# Patient Record
Sex: FEMALE | Race: WHITE | NOT HISPANIC OR LATINO | Employment: FULL TIME | URBAN - METROPOLITAN AREA
[De-identification: names, ages, dates, MRNs, and addresses within clinical notes are randomized per-mention and may not be internally consistent; named-entity substitution may affect disease eponyms.]

---

## 2020-07-14 ENCOUNTER — OFFICE VISIT (OUTPATIENT)
Dept: FAMILY MEDICINE | Facility: CLINIC | Age: 30
End: 2020-07-14
Payer: COMMERCIAL

## 2020-07-14 VITALS
BODY MASS INDEX: 24.48 KG/M2 | DIASTOLIC BLOOD PRESSURE: 80 MMHG | TEMPERATURE: 98.3 F | HEIGHT: 67 IN | SYSTOLIC BLOOD PRESSURE: 123 MMHG | OXYGEN SATURATION: 96 % | WEIGHT: 156 LBS | HEART RATE: 112 BPM | RESPIRATION RATE: 16 BRPM

## 2020-07-14 DIAGNOSIS — G43.719 INTRACTABLE CHRONIC MIGRAINE WITHOUT AURA AND WITHOUT STATUS MIGRAINOSUS: ICD-10-CM

## 2020-07-14 DIAGNOSIS — F41.9 ANXIETY: ICD-10-CM

## 2020-07-14 DIAGNOSIS — Z00.00 ROUTINE GENERAL MEDICAL EXAMINATION AT A HEALTH CARE FACILITY: Primary | ICD-10-CM

## 2020-07-14 PROCEDURE — 99385 PREV VISIT NEW AGE 18-39: CPT | Performed by: PHYSICIAN ASSISTANT

## 2020-07-14 RX ORDER — SERTRALINE HYDROCHLORIDE 100 MG/1
100 TABLET, FILM COATED ORAL DAILY
Qty: 90 TABLET | Refills: 3 | Status: SHIPPED | OUTPATIENT
Start: 2020-07-14 | End: 2021-12-15

## 2020-07-14 RX ORDER — AMITRIPTYLINE HYDROCHLORIDE 50 MG/1
50 TABLET ORAL AT BEDTIME
Qty: 90 TABLET | Refills: 3 | Status: SHIPPED | OUTPATIENT
Start: 2020-07-14 | End: 2021-12-15

## 2020-07-14 RX ORDER — AMITRIPTYLINE HYDROCHLORIDE 50 MG/1
TABLET ORAL
COMMUNITY
Start: 2020-06-09 | End: 2020-07-14

## 2020-07-14 RX ORDER — SERTRALINE HYDROCHLORIDE 100 MG/1
TABLET, FILM COATED ORAL
COMMUNITY
Start: 2020-06-09 | End: 2020-07-14

## 2020-07-14 ASSESSMENT — MIFFLIN-ST. JEOR: SCORE: 1623.3

## 2020-07-14 NOTE — PROGRESS NOTES
3  SUBJECTIVE:   CC: Leopoldo Rangel is an 29 year old male who presents for preventive health visit.     Healthy Habits:    Do you get at least three servings of calcium containing foods daily (dairy, green leafy vegetables, etc.)? yes    Amount of exercise or daily activities, outside of work: 2 day(s) per week    Problems taking medications regularly No    Medication side effects: No    Have you had an eye exam in the past two years? yes    Do you see a dentist twice per year? 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  Overall he has been feeling pretty good.  Work has not really changed since pandemic.  Does work in [employer].  Enjoys job.    Anxiety Follow-Up    How are you doing with your anxiety since your last visit? No change    Are you having other symptoms that might be associated with anxiety? No    Have you had a significant life event? No     Are you feeling depressed? No    Do you have any concerns with your use of alcohol or other drugs? No   Been on zoloft for a couple of years and has been very helpful in his symptoms.    Social History     Tobacco Use     Smoking status: Never Smoker     Smokeless tobacco: Never Used   Substance Use Topics     Alcohol use: Yes     Comment: occ.     Drug use: Never     No flowsheet data found.  No flowsheet data found.      Migraine     Since your last clinic visit, how have your headaches changed?  No change    How often are you getting headaches or migraines? 1-2 month     Are you able to do normal daily activities when you have a migraine? Yes    Are you taking rescue/relief medications? (Select all that apply) Excedrin    How helpful is your rescue/relief medication?  I get total relief    Are you taking any medications to prevent migraines? (Select all that apply)  Amitriptyline    In the past 4 weeks, how often have you gone to urgent care or the emergency room  "because of your headaches?  0        Today's PHQ-2 Score:   PHQ-2 ( 1999 Pfizer) 7/14/2020   Q1: Little interest or pleasure in doing things 0   Q2: Feeling down, depressed or hopeless 0   PHQ-2 Score 0       Abuse: Current or Past(Physical, Sexual or Emotional)- No  Do you feel safe in your environment? Yes        Social History     Tobacco Use     Smoking status: Never Smoker     Smokeless tobacco: Never Used   Substance Use Topics     Alcohol use: Yes     Comment: occ.     If you drink alcohol do you typically have >3 drinks per day or >7 drinks per week? No                      Last PSA: No results found for: PSA    Reviewed orders with patient. Reviewed health maintenance and updated orders accordingly - Yes  BP Readings from Last 3 Encounters:   07/14/20 123/80    Wt Readings from Last 3 Encounters:   07/14/20 70.8 kg (156 lb)                    Reviewed and updated as needed this visit by clinical staff  Tobacco  Allergies  Meds  Med Hx  Surg Hx  Fam Hx  Soc Hx        Reviewed and updated as needed this visit by Provider            ROS:  CONSTITUTIONAL: NEGATIVE for fever, chills, change in weight  INTEGUMENTARY/SKIN: NEGATIVE for worrisome rashes, moles or lesions  EYES: NEGATIVE for vision changes or irritation  ENT: NEGATIVE for ear, mouth and throat problems  RESP: NEGATIVE for significant cough or SOB  CV: NEGATIVE for chest pain, palpitations or peripheral edema  GI: NEGATIVE for nausea, abdominal pain, heartburn, or change in bowel habits   male: negative for dysuria, hematuria, decreased urinary stream, erectile dysfunction, urethral discharge  MUSCULOSKELETAL: NEGATIVE for significant arthralgias or myalgia  NEURO: NEGATIVE for weakness, dizziness or paresthesias  PSYCHIATRIC: NEGATIVE for changes in mood or affect    OBJECTIVE:   /80   Pulse 112   Temp 98.3  F (36.8  C) (Oral)   Resp 16   Ht 1.689 m (5' 6.5\")   Wt 70.8 kg (156 lb)   SpO2 96%   BMI 24.80 kg/m    EXAM:  GENERAL: " "alert and no distress  EYES: Eyes grossly normal to inspection, PERRL and conjunctivae and sclerae normal  HENT: ear canals and TM's normal, nose and mouth without ulcers or lesions  NECK: no adenopathy, no asymmetry, masses, or scars and thyroid normal to palpation  RESP: lungs clear to auscultation - no rales, rhonchi or wheezes  CV: regular rate and rhythm, normal S1 S2, no S3 or S4, no murmur, click or rub, no peripheral edema and peripheral pulses strong  ABDOMEN: soft, nontender, no hepatosplenomegaly, no masses and bowel sounds normal  MS: no gross musculoskeletal defects noted, no edema  SKIN: no suspicious lesions or rashes  NEURO: Normal strength and tone, mentation intact and speech normal  PSYCH: mentation appears normal, affect normal/bright    Diagnostic Test Results:  Labs reviewed in Epic    ASSESSMENT/PLAN:   1. Routine general medical examination at a health care facility  Doing well.  Declines fasting labs.  Thinks he is up to date on immunizations, going to check records at home.    2. Intractable chronic migraine without aura and without status migrainosus  Stable.  - amitriptyline (ELAVIL) 50 MG tablet; Take 1 tablet (50 mg) by mouth At Bedtime  Dispense: 90 tablet; Refill: 3    3. Anxiety  Stable.  - sertraline (ZOLOFT) 100 MG tablet; Take 1 tablet (100 mg) by mouth daily  Dispense: 90 tablet; Refill: 3    COUNSELING:  Reviewed preventive health counseling, as reflected in patient instructions       Regular exercise       Healthy diet/nutrition    Estimated body mass index is 24.8 kg/m  as calculated from the following:    Height as of this encounter: 1.689 m (5' 6.5\").    Weight as of this encounter: 70.8 kg (156 lb).         reports that he has never smoked. He has never used smokeless tobacco.      Counseling Resources:  ATP IV Guidelines  Pooled Cohorts Equation Calculator  FRAX Risk Assessment  ICSI Preventive Guidelines  Dietary Guidelines for Americans, 2010  USDA's MyPlate  ASA " Prophylaxis  Lung CA Screening    Rey Hester PA-C  [facility]

## 2020-12-08 ENCOUNTER — MYC MEDICAL ADVICE (OUTPATIENT)
Dept: FAMILY MEDICINE | Facility: CLINIC | Age: 30
End: 2020-12-08

## 2021-08-15 ENCOUNTER — HEALTH MAINTENANCE LETTER (OUTPATIENT)
Age: 31
End: 2021-08-15

## 2021-10-10 ENCOUNTER — HEALTH MAINTENANCE LETTER (OUTPATIENT)
Age: 31
End: 2021-10-10

## 2021-12-15 ENCOUNTER — E-VISIT (OUTPATIENT)
Dept: FAMILY MEDICINE | Facility: CLINIC | Age: 31
End: 2021-12-15
Payer: COMMERCIAL

## 2021-12-15 DIAGNOSIS — F41.9 ANXIETY: ICD-10-CM

## 2021-12-15 PROCEDURE — 99421 OL DIG E/M SVC 5-10 MIN: CPT | Performed by: PHYSICIAN ASSISTANT

## 2021-12-15 RX ORDER — SERTRALINE HYDROCHLORIDE 100 MG/1
150 TABLET, FILM COATED ORAL DAILY
Qty: 135 TABLET | Refills: 1 | Status: SHIPPED | OUTPATIENT
Start: 2021-12-15 | End: 2022-03-11

## 2022-03-11 ENCOUNTER — OFFICE VISIT (OUTPATIENT)
Dept: FAMILY MEDICINE | Facility: CLINIC | Age: 32
End: 2022-03-11
Payer: COMMERCIAL

## 2022-03-11 VITALS
WEIGHT: 165 LBS | RESPIRATION RATE: 14 BRPM | BODY MASS INDEX: 25.01 KG/M2 | TEMPERATURE: 97.4 F | HEART RATE: 66 BPM | SYSTOLIC BLOOD PRESSURE: 109 MMHG | OXYGEN SATURATION: 96 % | HEIGHT: 68 IN | DIASTOLIC BLOOD PRESSURE: 58 MMHG

## 2022-03-11 DIAGNOSIS — Z00.00 ROUTINE GENERAL MEDICAL EXAMINATION AT A HEALTH CARE FACILITY: Primary | ICD-10-CM

## 2022-03-11 DIAGNOSIS — F41.9 ANXIETY: ICD-10-CM

## 2022-03-11 DIAGNOSIS — Z11.4 SCREENING FOR HIV (HUMAN IMMUNODEFICIENCY VIRUS): ICD-10-CM

## 2022-03-11 DIAGNOSIS — L98.9 SKIN LESION: ICD-10-CM

## 2022-03-11 DIAGNOSIS — F90.0 ATTENTION DEFICIT HYPERACTIVITY DISORDER (ADHD), PREDOMINANTLY INATTENTIVE TYPE: ICD-10-CM

## 2022-03-11 DIAGNOSIS — Z76.89 ENCOUNTER TO ESTABLISH CARE WITH NEW DOCTOR: ICD-10-CM

## 2022-03-11 DIAGNOSIS — Z11.59 NEED FOR HEPATITIS C SCREENING TEST: ICD-10-CM

## 2022-03-11 LAB
AMPHETAMINES UR QL: NOT DETECTED
BARBITURATES UR QL SCN: NOT DETECTED
BENZODIAZ UR QL SCN: NOT DETECTED
BUPRENORPHINE UR QL: NOT DETECTED
CANNABINOIDS UR QL: NOT DETECTED
CHOLEST SERPL-MCNC: 201 MG/DL
COCAINE UR QL SCN: NOT DETECTED
D-METHAMPHET UR QL: NOT DETECTED
FASTING STATUS PATIENT QL REPORTED: NO
HCV AB SERPL QL IA: NONREACTIVE
HDLC SERPL-MCNC: 50 MG/DL
HIV 1+2 AB+HIV1 P24 AG SERPL QL IA: NONREACTIVE
LDLC SERPL CALC-MCNC: 127 MG/DL
METHADONE UR QL SCN: NOT DETECTED
NONHDLC SERPL-MCNC: 151 MG/DL
OPIATES UR QL SCN: NOT DETECTED
OXYCODONE UR QL SCN: NOT DETECTED
PCP UR QL SCN: NOT DETECTED
PROPOXYPH UR QL: NOT DETECTED
TRICYCLICS UR QL SCN: NOT DETECTED
TRIGL SERPL-MCNC: 122 MG/DL

## 2022-03-11 PROCEDURE — 90471 IMMUNIZATION ADMIN: CPT | Performed by: FAMILY MEDICINE

## 2022-03-11 PROCEDURE — 80061 LIPID PANEL: CPT | Performed by: FAMILY MEDICINE

## 2022-03-11 PROCEDURE — 80306 DRUG TEST PRSMV INSTRMNT: CPT | Performed by: FAMILY MEDICINE

## 2022-03-11 PROCEDURE — 36415 COLL VENOUS BLD VENIPUNCTURE: CPT | Performed by: FAMILY MEDICINE

## 2022-03-11 PROCEDURE — 86803 HEPATITIS C AB TEST: CPT | Performed by: FAMILY MEDICINE

## 2022-03-11 PROCEDURE — 90715 TDAP VACCINE 7 YRS/> IM: CPT | Performed by: FAMILY MEDICINE

## 2022-03-11 PROCEDURE — 99395 PREV VISIT EST AGE 18-39: CPT | Mod: 25 | Performed by: FAMILY MEDICINE

## 2022-03-11 PROCEDURE — 90651 9VHPV VACCINE 2/3 DOSE IM: CPT | Performed by: FAMILY MEDICINE

## 2022-03-11 PROCEDURE — 90472 IMMUNIZATION ADMIN EACH ADD: CPT | Performed by: FAMILY MEDICINE

## 2022-03-11 PROCEDURE — 87389 HIV-1 AG W/HIV-1&-2 AB AG IA: CPT | Performed by: FAMILY MEDICINE

## 2022-03-11 RX ORDER — SERTRALINE HYDROCHLORIDE 100 MG/1
150 TABLET, FILM COATED ORAL DAILY
Qty: 135 TABLET | Refills: 3 | Status: SHIPPED | OUTPATIENT
Start: 2022-03-11 | End: 2023-03-29

## 2022-03-11 ASSESSMENT — ENCOUNTER SYMPTOMS
DYSURIA: 0
JOINT SWELLING: 0
NERVOUS/ANXIOUS: 0
FEVER: 0
COUGH: 0
HEMATOCHEZIA: 0
PALPITATIONS: 0
WEAKNESS: 0
DIZZINESS: 0
MYALGIAS: 0
HEMATURIA: 0
CHILLS: 0
HEARTBURN: 0
PARESTHESIAS: 0
HEADACHES: 1
NAUSEA: 0
DIARRHEA: 0
FREQUENCY: 0
ABDOMINAL PAIN: 0
SORE THROAT: 0
ARTHRALGIAS: 0
CONSTIPATION: 0
SHORTNESS OF BREATH: 0
EYE PAIN: 0

## 2022-03-11 NOTE — PATIENT INSTRUCTIONS
For your ADHD:    Send me result of diagnostic assessment ADHD in college    Then we'll start 20 mg Vyvanse    Follow up 1 month with me in clinic or virtual visit      Preventive Health Recommendations  Male Ages 26 - 39    Yearly exam:             See your health care provider every year in order to  o   Review health changes.   o   Discuss preventive care.    o   Review your medicines if your doctor has prescribed any.    You should be tested each year for STDs (sexually transmitted diseases), if you re at risk.     After age 35, talk to your provider about cholesterol testing. If you are at risk for heart disease, have your cholesterol tested at least every 5 years.     If you are at risk for diabetes, you should have a diabetes test (fasting glucose).  Shots: Get a flu shot each year. Get a tetanus shot every 10 years.     Nutrition:    Eat at least 5 servings of fruits and vegetables daily.     Eat whole-grain bread, whole-wheat pasta and brown rice instead of white grains and rice.     Get adequate Calcium and Vitamin D.     Lifestyle    Exercise for at least 150 minutes a week (30 minutes a day, 5 days a week). This will help you control your weight and prevent disease.     Limit alcohol to one drink per day.     No smoking.     Wear sunscreen to prevent skin cancer.     See your dentist every six months for an exam and cleaning.

## 2022-03-11 NOTE — LETTER
[facility]  03/11/22  Patient: Leopoldo Rangel  YOB: 1990  Medical Record Number: [id number]                                                                                  Non-Opioid Controlled Substance Agreement    This is an agreement between you and your provider regarding safe and appropriate use of controlled substances prescribed by your care team. Controlled substances are?medicines that can cause physical and mental dependence (abuse).     There are strict laws about having and using these medicines. We here at [facility] are  committed to working with you in your efforts to get better. To support you in this work, we'll help you schedule regular office appointments for medicine refills. If we must cancel or change your appointment for any reason, we'll make sure you have enough medicine to last until your next appointment.     As a Provider, I will:     Listen carefully to your concerns while treating you with respect.     Recommend a treatment plan that I believe is in your best interest and may involve therapies other than medicine.      Talk with you often about the possible benefits and the risk of harm of any medicine that we prescribe for you.    Assess the safety of this medicine and check how well it works.      Provide a plan on how to taper (discontinue or go off) using this medicine if the decision is made to stop its use.      ::  As a Patient, I understand controlled substances:       Are prescribed by my care provider to help me function or work and manage my condition(s).?    Are strong medicines and can cause serious side effects.       Need to be taken exactly as prescribed.?Combining controlled substances with certain medicines or chemicals (such as illegal drugs, alcohol, sedatives, sleeping pills, and benzodiazepines) can be dangerous or even fatal.? If I stop taking my medicines suddenly, I may have severe withdrawal symptoms.     The  risks, benefits, and side effects of these medicine(s) were explained to me. I agree that:    1. I will take part in other treatments as advised by my care team. This may be psychiatry or counseling, physical therapy, behavioral therapy, group treatment or a referral to specialist.    2. I will keep all my appointments and understand this is part of the monitoring of controlled substances.?My care team may require an office visit for EVERY controlled substance refill. If I miss appointments or don t follow instructions, my care team may stop my medicine    3. I will take my medicines as prescribed. I will not change the dose or schedule unless my care team tells me to. There will be no refills if I run out early.      4. I may be asked to come to the clinic and complete a urine drug test or complete a pill count. If I don t give a urine sample or participate in a pill count, the care team may stop my medicine.    5. I will only receive controlled substance prescriptions from this clinic. If I am treated by another provider, I will tell them that I am taking controlled substances and that I have a treatment agreement with this provider. I will inform my [facility] care team within one business day if I am given a prescription for any controlled substance by another healthcare provider. My [facility] care team can contact other providers and pharmacists about my use of any medicines.    6. It is up to me to make sure that I don't run out of my medicines on weekends or holidays.?If my care team is willing to refill my prescription without a visit, I must request refills only during office hours. Refills may take up to 3 business days to process. I will use one pharmacy to fill all my controlled substance prescriptions. I will notify the clinic about any changes to my insurance or medicine availability.    7. I am responsible for my prescriptions. If the medicine/prescription is lost, stolen or destroyed,  it will not be replaced.?I also agree not to share controlled substance medicines with anyone.     8. I am aware I should not use any illegal or recreational drugs. I agree not to drink alcohol unless my care team says I can.     9. If I enroll in the [place] Medical Cannabis program, I will tell my care team before my next refill.    10. I will tell my care team right away if I become pregnant, have a new medical problem treated outside of my regular clinic, or have a change in my medicines.     11. I understand that this medicine can affect my thinking, judgment and reaction time.? Alcohol and drugs affect the brain and body, which can affect the safety of my driving. Being under the influence of alcohol or drugs can affect my decision-making, behaviors, personal safety and the safety of others. Driving while impaired (DWI) can occur if a person is driving, operating or in physical control of a car, motorcycle, boat, snowmobile, ATV, motorbike, off-road vehicle or any other motor vehicle ([place] Statute 169A.20). I understand the risk if I choose to drive or operate any vehicle or machinery.    I understand that if I do not follow any of the conditions above, my prescriptions or treatment may be stopped or changed.   I agree that my provider, clinic care team and pharmacy may work with any city, state or federal law enforcement agency that investigates the misuse, sale or other diversion of my controlled medicine. I will allow my provider to discuss my care with, or share a copy of, this agreement with any other treating provider, pharmacy or emergency room where I receive care.     I have read this agreement and have asked questions about anything I did not understand.    ________________________________________________________  Patient Signature - Leopoldo Rangel     ___________________                   Date     ________________________________________________________  Provider Signature - Chelsey Patel MD        ___________________                   Date     ________________________________________________________  Witness Signature (required if provider not present while patient signing)          ___________________                   Date

## 2022-03-11 NOTE — PROGRESS NOTES
SUBJECTIVE:   CC: Leopoldo Rangel is an 31 year old male who presents for preventative health visit.       Patient has been advised of split billing requirements and indicates understanding: Yes  Healthy Habits:     Getting at least 3 servings of Calcium per day:  Yes    Bi-annual eye exam:  Yes    Dental care twice a year:  Yes    Sleep apnea or symptoms of sleep apnea:  Daytime drowsiness    Diet:  Regular (no restrictions)    Frequency of exercise:  None    Taking medications regularly:  Yes    Medication side effects:  None    PHQ-2 Total Score: 0    Additional concerns today:  Yes    1. Moles to look at.  Looked at years ago.  10 yrs.  Haven't changed a lot.  Not bleeding or itching.    2.  Anxiety.  Diagnosed mid college, but manageable.  Also has ADHD and diagnosed in college.  Then a few years later - would have pangs of anxiety out of nowhere connected to nothing.  Then talked to pcp, started sertraline.  Bumped up to 150.  Well controlled now.    Also:  In therapy right now.  Helped.  Getting enough sleep helps.  But doesn't do it enough.  Has been taking melatonin semi-periodically which helps.  Exercise: doesn't.  Likes rock climbing.  Went to [employer].    Also:  ADHD.  Diagnosed in college.  Was on Vyvanse 10 mg - didn't really work.  Tried ritalin - not helpful.  Dad has ADD and on Vyvanse worked well.  Also tried other medications, can't remember which.      No flowsheet data found.      Today's PHQ-2 Score:   PHQ-2 ( 1999 Pfizer) 3/11/2022   Q1: Little interest or pleasure in doing things 0   Q2: Feeling down, depressed or hopeless 0   PHQ-2 Score 0   PHQ-2 Total Score (12-17 Years)- Positive if 3 or more points; Administer PHQ-A if positive -   Q1: Little interest or pleasure in doing things Not at all   Q2: Feeling down, depressed or hopeless Not at all   PHQ-2 Score 0       Abuse: Current or Past(Physical, Sexual or Emotional)- NA  Do you feel safe in your environment? Yes    Have you ever done  Advance Care Planning? (For example, a Health Directive, POLST, or a discussion with a medical provider or your loved ones about your wishes): No, advance care planning information given to patient to review.  Advanced care planning was discussed at today's visit.    Social History     Tobacco Use     Smoking status: Never Smoker     Smokeless tobacco: Never Used   Substance Use Topics     Alcohol use: Yes     Comment: occ.     If you drink alcohol do you typically have >3 drinks per day or >7 drinks per week? No    Alcohol Use 3/11/2022   Prescreen: >3 drinks/day or >7 drinks/week? No   Prescreen: >3 drinks/day or >7 drinks/week? -       Last PSA: No results found for: PSA    Reviewed orders with patient. Reviewed health maintenance and updated orders accordingly - Yes  Lab work is in process    Reviewed and updated as needed this visit by clinical staff   Tobacco  Allergies  Meds  Problems  Med Hx  Surg Hx  Fam Hx  Soc   Hx        Reviewed and updated as needed this visit by Provider   Tobacco  Allergies  Meds  Problems  Med Hx  Surg Hx  Fam Hx             Review of Systems   Constitutional: Negative for chills and fever.   HENT: Positive for congestion. Negative for ear pain, hearing loss and sore throat.    Eyes: Negative for pain and visual disturbance.   Respiratory: Negative for cough and shortness of breath.    Cardiovascular: Negative for chest pain, palpitations and peripheral edema.   Gastrointestinal: Negative for abdominal pain, constipation, diarrhea, heartburn, hematochezia and nausea.   Genitourinary: Negative for dysuria, frequency, genital sores, hematuria, impotence, penile discharge and urgency.   Musculoskeletal: Negative for arthralgias, joint swelling and myalgias.   Skin: Negative for rash.   Neurological: Positive for headaches. Negative for dizziness, weakness and paresthesias.   Psychiatric/Behavioral: Negative for mood changes. The patient is not nervous/anxious.   "        OBJECTIVE:   /58 (BP Location: Left arm, Patient Position: Chair, Cuff Size: Adult Regular)   Pulse 66   Temp 97.4  F (36.3  C) (Temporal)   Resp 14   Ht 1.715 m (5' 7.5\")   Wt 74.8 kg (165 lb)   SpO2 96%   BMI 25.46 kg/m      Physical Exam  GENERAL: healthy, alert and no distress  EYES: Eyes grossly normal to inspection, PERRL and conjunctivae and sclerae normal  HENT: ear canals and TM's normal, nose and mouth without ulcers or lesions  NECK: no adenopathy, no asymmetry, masses, or scars and thyroid normal to palpation  RESP: lungs clear to auscultation - no rales, rhonchi or wheezes  CV: regular rate and rhythm, normal S1 S2, no S3 or S4, no murmur, click or rub, no peripheral edema and peripheral pulses strong  ABDOMEN: soft, nontender, no hepatosplenomegaly, no masses and bowel sounds normal  MS: no gross musculoskeletal defects noted, no edema  SKIN: no suspicious lesions or rashes  NEURO: Normal strength and tone, mentation intact and speech normal  PSYCH: mentation appears normal, affect normal/bright    Diagnostic Test Results:  Labs reviewed in Epic    ASSESSMENT/PLAN:   Leopoldo was seen today for establish care and physical.    Diagnoses and all orders for this visit:    Routine general medical examination at a health care facility  Comments:  New to me today.  Lipids done. Routine hep c and hiv per cdc recs  TDAP today  Gardasil 2nd today, will do 3rd next visit    Orders:  -     REVIEW OF HEALTH MAINTENANCE PROTOCOL ORDERS  -     TDAP VACCINE (Adacel, Boostrix)  [[id number]]  -     Lipid panel reflex to direct LDL Fasting; Future  -     HPV, IM (9 - 26 YRS) - Gardasil 9  -     Adult Dermatology Referral; Future  -     Lipid panel reflex to direct LDL Fasting    Encounter to establish care with new doctor  Comments:  Reviewed and updated probl list and pmx with patient and via Care Everywhere.    Screening for HIV (human immunodeficiency virus)  Comments:  Routine, low risk.  Once as " "adult per cdc recs  Orders:  -     HIV Antigen Antibody Combo; Future  -     HIV Antigen Antibody Combo    Need for hepatitis C screening test  Comments:  Routine, low risk.  Once as adult per cdc recs  Orders:  -     Hepatitis C Screen Reflex to HCV RNA Quant and Genotype; Future  -     Hepatitis C Screen Reflex to HCV RNA Quant and Genotype    Anxiety  Comments:  Since college.  Sertraline works well.  Refilled today.  Also: discussed importance of exercise, sleep, meditation/mindfulness as part of anxiety management  Orders:  -     sertraline (ZOLOFT) 100 MG tablet; Take 1.5 tablets (150 mg) by mouth daily    Attention deficit hyperactivity disorder (ADHD), predominantly inattentive type  Comments:  Dx'd in college.  Will send paperwork  Vyvanse worked well in past and for his dad  Plan: utox and csa done today  Once sends in dx paperwork will start vyv 20 mg  Follow up E-visit 1 month or in person, virtual  Orders:  -     Drug Abuse Screen Panel 13, Urine (Pain Care Package) - lab collect; Future  -     Drug Abuse Screen Panel 13, Urine (Pain Care Package) - lab collect    Skin lesion  Comments:  referred dermatology        Patient has been advised of split billing requirements and indicates understanding: Yes    COUNSELING:   Reviewed preventive health counseling, as reflected in patient instructions    Estimated body mass index is 25.46 kg/m  as calculated from the following:    Height as of this encounter: 1.715 m (5' 7.5\").    Weight as of this encounter: 74.8 kg (165 lb).         He reports that he has never smoked. He has never used smokeless tobacco.      Counseling Resources:  ATP IV Guidelines  Pooled Cohorts Equation Calculator  FRAX Risk Assessment  ICSI Preventive Guidelines  Dietary Guidelines for Americans, 2010  USDA's MyPlate  ASA Prophylaxis  Lung CA Screening    Chelsey Patel MD  [facility]    "

## 2022-03-11 NOTE — PROGRESS NOTES
Prior to immunization administration, verified patients identity using patient s name and date of birth. Please see Immunization Activity for additional information.     Screening Questionnaire for Adult Immunization    Are you sick today?   No   Do you have allergies to medications, food, a vaccine component or latex?   No   Have you ever had a serious reaction after receiving a vaccination?   No   Do you have a long-term health problem with heart, lung, kidney, or metabolic disease (e.g., diabetes), asthma, a blood disorder, no spleen, complement component deficiency, a cochlear implant, or a spinal fluid leak?  Are you on long-term aspirin therapy?   No   Do you have cancer, leukemia, HIV/AIDS, or any other immune system problem?   No   Do you have a parent, brother, or sister with an immune system problem?   No   In the past 3 months, have you taken medications that affect  your immune system, such as prednisone, other steroids, or anticancer drugs; drugs for the treatment of rheumatoid arthritis, Crohn s disease, or psoriasis; or have you had radiation treatments?   No   Have you had a seizure, or a brain or other nervous system problem?   No   During the past year, have you received a transfusion of blood or blood    products, or been given immune (gamma) globulin or antiviral drug?   No   For women: Are you pregnant or is there a chance you could become       pregnant during the next month?   No   Have you received any vaccinations in the past 4 weeks?   No     Immunization questionnaire answers were all negative.        Per orders of Dr. Chelsey Patel, injection of Tdap/HPV given by Lisa Silvestre MA. Patient instructed to remain in clinic for 15 minutes afterwards, and to report any adverse reaction to me immediately.       Screening performed by Lisa Silvestre MA on 3/11/2022 at 11:10 AM.    Answers for HPI/ROS submitted by the patient on 3/11/2022  Frequency of exercise:: None  Getting at least 3 servings of  Calcium per day:: Yes  Diet:: Regular (no restrictions)  Taking medications regularly:: Yes  Medication side effects:: None  Bi-annual eye exam:: Yes  Dental care twice a year:: Yes  Sleep apnea or symptoms of sleep apnea:: Daytime drowsiness  abdominal pain: No  Blood in stool: No  Blood in urine: No  chest pain: No  chills: No  congestion: Yes  constipation: No  cough: No  diarrhea: No  dizziness: No  ear pain: No  eye pain: No  nervous/anxious: No  fever: No  frequency: No  genital sores: No  headaches: Yes  hearing loss: No  heartburn: No  arthralgias: No  joint swelling: No  peripheral edema: No  mood changes: No  myalgias: No  nausea: No  dysuria: No  palpitations: No  Skin sensation changes: No  sore throat: No  urgency: No  rash: No  shortness of breath: No  visual disturbance: No  weakness: No  impotence: No  penile discharge: No  Additional concerns today:: Yes

## 2022-03-12 NOTE — RESULT ENCOUNTER NOTE
Artie Mina,    It was nice to see you in clinic recently! Your results are back and show:    Your cholesterol is borderline high, but your overall heart disease risk score is low.  Continue exercise and heart healthy Mediterranean-style diet rich in fish, olive oil, whole grains, vegetables and low in animal fats and processed foods to reduce your risk of heart disease.    The rest of your lab tests are normal.    Let me know if you have any questions.  Best,  Dr. Chelsey Patel MD/[facility]

## 2022-03-14 PROBLEM — R48.0 DYSLEXIA: Status: ACTIVE | Noted: 2022-03-14

## 2022-03-14 RX ORDER — LISDEXAMFETAMINE DIMESYLATE 20 MG/1
20 CAPSULE ORAL EVERY MORNING
Qty: 30 CAPSULE | Refills: 0 | Status: SHIPPED | OUTPATIENT
Start: 2022-03-14 | End: 2022-04-26

## 2022-04-26 ENCOUNTER — VIRTUAL VISIT (OUTPATIENT)
Dept: FAMILY MEDICINE | Facility: CLINIC | Age: 32
End: 2022-04-26
Payer: COMMERCIAL

## 2022-04-26 DIAGNOSIS — F90.0 ATTENTION DEFICIT HYPERACTIVITY DISORDER (ADHD), PREDOMINANTLY INATTENTIVE TYPE: Primary | ICD-10-CM

## 2022-04-26 PROCEDURE — 99213 OFFICE O/P EST LOW 20 MIN: CPT | Mod: 95 | Performed by: FAMILY MEDICINE

## 2022-04-26 RX ORDER — LISDEXAMFETAMINE DIMESYLATE 30 MG/1
30 CAPSULE ORAL EVERY MORNING
Qty: 30 CAPSULE | Refills: 0 | Status: SHIPPED | OUTPATIENT
Start: 2022-04-26 | End: 2022-05-24

## 2022-04-26 ASSESSMENT — ANXIETY QUESTIONNAIRES
7. FEELING AFRAID AS IF SOMETHING AWFUL MIGHT HAPPEN: SEVERAL DAYS
1. FEELING NERVOUS, ANXIOUS, OR ON EDGE: SEVERAL DAYS
6. BECOMING EASILY ANNOYED OR IRRITABLE: SEVERAL DAYS
4. TROUBLE RELAXING: SEVERAL DAYS
7. FEELING AFRAID AS IF SOMETHING AWFUL MIGHT HAPPEN: SEVERAL DAYS
5. BEING SO RESTLESS THAT IT IS HARD TO SIT STILL: MORE THAN HALF THE DAYS
2. NOT BEING ABLE TO STOP OR CONTROL WORRYING: NOT AT ALL
GAD7 TOTAL SCORE: 7
3. WORRYING TOO MUCH ABOUT DIFFERENT THINGS: SEVERAL DAYS
GAD7 TOTAL SCORE: 7
GAD7 TOTAL SCORE: 7

## 2022-04-26 ASSESSMENT — PATIENT HEALTH QUESTIONNAIRE - PHQ9
10. IF YOU CHECKED OFF ANY PROBLEMS, HOW DIFFICULT HAVE THESE PROBLEMS MADE IT FOR YOU TO DO YOUR WORK, TAKE CARE OF THINGS AT HOME, OR GET ALONG WITH OTHER PEOPLE: SOMEWHAT DIFFICULT
SUM OF ALL RESPONSES TO PHQ QUESTIONS 1-9: 5
SUM OF ALL RESPONSES TO PHQ QUESTIONS 1-9: 5

## 2022-04-26 NOTE — PROGRESS NOTES
Leopoldo is a 31 year old who is being evaluated via a billable video visit.      How would you like to obtain your AVS? [employer]  If the video visit is dropped, the invitation should be resent by: Text to cell phone: [phone]  Will anyone else be joining your video visit? No      Video Start Time: 8:52 AM    Assessment & Plan     Leopoldo was seen today for recheck medication.    Diagnoses and all orders for this visit:    Attention deficit hyperactivity disorder (ADHD), predominantly inattentive type  Comments:  No side effects on 20 mg  Not lasting through day  Try increase to 30  F/U w/ evisit 1 mo  Also: continue to work to increase exercise - rock climb, walk dog!  Orders:  -     lisdexamfetamine (VYVANSE) 30 MG capsule; Take 1 capsule (30 mg) by mouth every morning          Prescription drug management      Rev:      Return in about 4 weeks (around 5/24/2022) for ADHD, E-VISIT: in [employer] Main Menu Select E-visit.    Chelsey Patel MD  [facility]   Leopoldo is a 31 year old who presents for the following health issues     History of Present Illness       Mental Health Follow-up:                    Today's PHQ-9         PHQ-9 Total Score: 5  PHQ-9 Q9 Thoughts of better off dead/self-harm past 2 weeks :   (P) Not at all    How difficult have these problems made it for you to do your work, take care of things at home, or get along with other people: Somewhat difficult    Today's FERNANDO-7 Score: 7    Reason for visit:  ADHD Medication Check-in  Symptom onset:  More than a month    He eats 2-3 servings of fruits and vegetables daily.He consumes 0 sweetened beverage(s) daily.He exercises with enough effort to increase his heart rate 9 or less minutes per day.  He exercises with enough effort to increase his heart rate 3 or less days per week.   He is taking medications regularly.     Here today for Vyvanse follow up.    Seems to be helping.  But wears off relatively  "quickly.  10-12 is sweet spot, then tapers off.    Sleep okay.  Going to bed at 11 pm - falls asleep.  Appetite same.  Hasn't lost any weight that knows of    Hasn't really exercised since last time BUT has been texting a friend to get rock climbing on the books.          Review of Systems   Constitutional, HEENT, cardiovascular, pulmonary, gi and gu systems are negative, except as otherwise noted.      Objective    Vitals - Patient Reported  Weight (Patient Reported): 68 kg (150 lb)  Height (Patient Reported): 170.2 cm (5' 7\")  BMI (Based on Pt Reported Ht/Wt): 23.49      Vitals:  No vitals were obtained today due to virtual visit.    Physical Exam   GENERAL: Healthy, alert and no distress  EYES: Eyes grossly normal to inspection.  No discharge or erythema, or obvious scleral/conjunctival abnormalities.  RESP: No audible wheeze, cough, or visible cyanosis.  No visible retractions or increased work of breathing.    SKIN: Visible skin clear. No significant rash, abnormal pigmentation or lesions.  NEURO: Cranial nerves grossly intact.  Mentation and speech appropriate for age.  PSYCH: Mentation appears normal, affect normal/bright, judgement and insight intact, normal speech and appearance well-groomed.    No results found for any visits on 04/26/22.  Office Visit on 03/11/2022   Component Date Value Ref Range Status     HIV Antigen Antibody Combo 03/11/2022 Nonreactive  Nonreactive Final    HIV-1 p24 Ag & HIV-1/HIV-2 Ab Not Detected     Hepatitis C Antibody 03/11/2022 Nonreactive  Nonreactive Final     Cholesterol 03/11/2022 201 (A) <200 mg/dL Final     Triglycerides 03/11/2022 122  <150 mg/dL Final     Direct Measure HDL 03/11/2022 50  >=40 mg/dL Final     LDL Cholesterol Calculated 03/11/2022 127 (A) <=100 mg/dL Final     Non HDL Cholesterol 03/11/2022 151 (A) <130 mg/dL Final     Patient Fasting > 8hrs? 03/11/2022 No   Final     Cannabinoids (81-ecj-8-carboxy-9-T* 03/11/2022 Not Detected  Not Detected, " Indeterminate Final    Cutoff for a negative cannabinoid is 50 ng/mL or less.     Phencyclidine 03/11/2022 Not Detected  Not Detected, Indeterminate Final    Cutoff for a negative PCP is 25 ng/mL or less.     Cocaine (Benzoylecgonine) 03/11/2022 Not Detected  Not Detected, Indeterminate Final    Cutoff for a negative cocaine is 150 ng/ml or less.     Methamphetamine (d-Methamphetamine) 03/11/2022 Not Detected  Not Detected, Indeterminate Final    Cutoff for a negative methamphetamine is 500 ng/ml or less.     Opiates (Morphine) 03/11/2022 Not Detected  Not Detected, Indeterminate Final    Cutoff for a negative opiate is 100 ng/ml or less.     Amphetamine (d-Amphetamine) 03/11/2022 Not Detected  Not Detected, Indeterminate Final    Cutoff for a negative amphetamine is 500 ng/mL or less.     Benzodiazepines (Nordiazepam) 03/11/2022 Not Detected  Not Detected, Indeterminate Final    Cutoff for a negative benzodiazepine is 150 ng/ml or less.     Tricyclic Antidepressants (Desipra* 03/11/2022 Not Detected  Not Detected, Indeterminate Final    Cutoff for a negative tricyclic antidepressant is 300 ng/ml or less.     Methadone 03/11/2022 Not Detected  Not Detected, Indeterminate Final    Cutoff for a negative methadone is 200 ng/ml or less.     Barbiturates (Butalbital) 03/11/2022 Not Detected  Not Detected, Indeterminate Final    Cutoff for a negative barbituate is 200 ng/ml or less.     Oxycodone 03/11/2022 Not Detected  Not Detected, Indeterminate Final    Cutoff for a negative oxycodone is 100 ng/mL or less.     Propoxyphene (Norpropoxyphene) 03/11/2022 Not Detected  Not Detected, Indeterminate Final    Cutoff for a negative propoxyphene is 300 ng/ml or less.     Buprenorphine 03/11/2022 Not Detected  Not Detected, Indeterminate Final    Cutoff for a negative buprenorphine is 10 ng/ml or less.             Video-Visit Details    Type of service:  Video Visit    Video End Time:9:05 AM    Originating Location (pt.  Location): Home    Distant Location (provider location):  [facility]     Platform used for Video Visit: [employer]

## 2022-04-27 ASSESSMENT — PATIENT HEALTH QUESTIONNAIRE - PHQ9: SUM OF ALL RESPONSES TO PHQ QUESTIONS 1-9: 5

## 2022-04-27 ASSESSMENT — ANXIETY QUESTIONNAIRES: GAD7 TOTAL SCORE: 7

## 2022-05-03 ENCOUNTER — LAB (OUTPATIENT)
Dept: LAB | Facility: CLINIC | Age: 32
End: 2022-05-03
Attending: FAMILY MEDICINE
Payer: COMMERCIAL

## 2022-05-03 DIAGNOSIS — Z20.822 ENCOUNTER FOR LABORATORY TESTING FOR COVID-19 VIRUS: ICD-10-CM

## 2022-05-03 PROCEDURE — U0005 INFEC AGEN DETEC AMPLI PROBE: HCPCS

## 2022-05-03 PROCEDURE — U0003 INFECTIOUS AGENT DETECTION BY NUCLEIC ACID (DNA OR RNA); SEVERE ACUTE RESPIRATORY SYNDROME CORONAVIRUS 2 (SARS-COV-2) (CORONAVIRUS DISEASE [COVID-19]), AMPLIFIED PROBE TECHNIQUE, MAKING USE OF HIGH THROUGHPUT TECHNOLOGIES AS DESCRIBED BY CMS-2020-01-R: HCPCS

## 2022-05-04 LAB — SARS-COV-2 RNA RESP QL NAA+PROBE: NEGATIVE

## 2022-05-24 ENCOUNTER — E-VISIT (OUTPATIENT)
Dept: FAMILY MEDICINE | Facility: CLINIC | Age: 32
End: 2022-05-24
Payer: COMMERCIAL

## 2022-05-24 DIAGNOSIS — F90.0 ATTENTION DEFICIT HYPERACTIVITY DISORDER (ADHD), PREDOMINANTLY INATTENTIVE TYPE: Primary | ICD-10-CM

## 2022-05-24 PROCEDURE — 99421 OL DIG E/M SVC 5-10 MIN: CPT | Performed by: FAMILY MEDICINE

## 2022-05-24 RX ORDER — LISDEXAMFETAMINE DIMESYLATE 30 MG/1
30 CAPSULE ORAL DAILY
Qty: 30 CAPSULE | Refills: 0 | Status: SHIPPED | OUTPATIENT
Start: 2022-05-24 | End: 2022-06-23

## 2022-05-24 RX ORDER — LISDEXAMFETAMINE DIMESYLATE 30 MG/1
30 CAPSULE ORAL DAILY
Qty: 30 CAPSULE | Refills: 0 | Status: SHIPPED | OUTPATIENT
Start: 2022-07-25 | End: 2022-08-24

## 2022-05-24 RX ORDER — LISDEXAMFETAMINE DIMESYLATE 30 MG/1
30 CAPSULE ORAL DAILY
Qty: 30 CAPSULE | Refills: 0 | Status: SHIPPED | OUTPATIENT
Start: 2022-06-24 | End: 2022-07-24

## 2022-05-24 ASSESSMENT — ANXIETY QUESTIONNAIRES
GAD7 TOTAL SCORE: 11
5. BEING SO RESTLESS THAT IT IS HARD TO SIT STILL: MORE THAN HALF THE DAYS
GAD7 TOTAL SCORE: 11
8. IF YOU CHECKED OFF ANY PROBLEMS, HOW DIFFICULT HAVE THESE MADE IT FOR YOU TO DO YOUR WORK, TAKE CARE OF THINGS AT HOME, OR GET ALONG WITH OTHER PEOPLE?: SOMEWHAT DIFFICULT
4. TROUBLE RELAXING: MORE THAN HALF THE DAYS
7. FEELING AFRAID AS IF SOMETHING AWFUL MIGHT HAPPEN: SEVERAL DAYS
2. NOT BEING ABLE TO STOP OR CONTROL WORRYING: MORE THAN HALF THE DAYS
6. BECOMING EASILY ANNOYED OR IRRITABLE: SEVERAL DAYS
1. FEELING NERVOUS, ANXIOUS, OR ON EDGE: MORE THAN HALF THE DAYS
7. FEELING AFRAID AS IF SOMETHING AWFUL MIGHT HAPPEN: SEVERAL DAYS
GAD7 TOTAL SCORE: 11
3. WORRYING TOO MUCH ABOUT DIFFERENT THINGS: SEVERAL DAYS

## 2022-05-24 NOTE — PATIENT INSTRUCTIONS
Thank you for choosing us for your care. I have placed an order for a prescription so that you can start treatment. View your full visit summary for details by clicking on the link below. Your pharmacist will able to address any questions you may have about the medication.      If you're not feeling better within 4-6 weeks please schedule an appointment.  You can schedule an appointment right here in [facility], or call [phone]  If the visit is for the same symptoms as your eVisit, we'll refund the cost of your eVisit if seen within seven days.

## 2022-05-25 ASSESSMENT — ANXIETY QUESTIONNAIRES: GAD7 TOTAL SCORE: 11

## 2022-06-21 ENCOUNTER — MYC MEDICAL ADVICE (OUTPATIENT)
Dept: FAMILY MEDICINE | Facility: CLINIC | Age: 32
End: 2022-06-21

## 2022-06-23 NOTE — TELEPHONE ENCOUNTER
See RN response to patient's mychart message re:sertraline refill,    ESSENCE MadisonN RN  [facility]

## 2022-07-01 ENCOUNTER — MYC MEDICAL ADVICE (OUTPATIENT)
Dept: FAMILY MEDICINE | Facility: CLINIC | Age: 32
End: 2022-07-01

## 2022-09-07 ENCOUNTER — E-VISIT (OUTPATIENT)
Dept: FAMILY MEDICINE | Facility: CLINIC | Age: 32
End: 2022-09-07
Payer: COMMERCIAL

## 2022-09-07 DIAGNOSIS — F90.0 ATTENTION DEFICIT HYPERACTIVITY DISORDER (ADHD), PREDOMINANTLY INATTENTIVE TYPE: Primary | ICD-10-CM

## 2022-09-07 PROCEDURE — 99422 OL DIG E/M SVC 11-20 MIN: CPT | Performed by: FAMILY MEDICINE

## 2022-09-07 RX ORDER — LISDEXAMFETAMINE DIMESYLATE 30 MG/1
30 CAPSULE ORAL DAILY
Qty: 30 CAPSULE | Refills: 0 | Status: SHIPPED | OUTPATIENT
Start: 2022-11-08 | End: 2022-12-08

## 2022-09-07 RX ORDER — LISDEXAMFETAMINE DIMESYLATE 30 MG/1
30 CAPSULE ORAL DAILY
Qty: 30 CAPSULE | Refills: 0 | Status: SHIPPED | OUTPATIENT
Start: 2022-09-07 | End: 2022-10-07

## 2022-09-07 RX ORDER — LISDEXAMFETAMINE DIMESYLATE 30 MG/1
30 CAPSULE ORAL DAILY
Qty: 30 CAPSULE | Refills: 0 | Status: SHIPPED | OUTPATIENT
Start: 2022-10-08 | End: 2022-11-07

## 2022-09-07 ASSESSMENT — ANXIETY QUESTIONNAIRES
GAD7 TOTAL SCORE: 3
2. NOT BEING ABLE TO STOP OR CONTROL WORRYING: NOT AT ALL
GAD7 TOTAL SCORE: 3
5. BEING SO RESTLESS THAT IT IS HARD TO SIT STILL: SEVERAL DAYS
4. TROUBLE RELAXING: SEVERAL DAYS
1. FEELING NERVOUS, ANXIOUS, OR ON EDGE: NOT AT ALL
GAD7 TOTAL SCORE: 3
7. FEELING AFRAID AS IF SOMETHING AWFUL MIGHT HAPPEN: NOT AT ALL
6. BECOMING EASILY ANNOYED OR IRRITABLE: SEVERAL DAYS
8. IF YOU CHECKED OFF ANY PROBLEMS, HOW DIFFICULT HAVE THESE MADE IT FOR YOU TO DO YOUR WORK, TAKE CARE OF THINGS AT HOME, OR GET ALONG WITH OTHER PEOPLE?: SOMEWHAT DIFFICULT
3. WORRYING TOO MUCH ABOUT DIFFERENT THINGS: NOT AT ALL
7. FEELING AFRAID AS IF SOMETHING AWFUL MIGHT HAPPEN: NOT AT ALL

## 2022-09-08 ASSESSMENT — ANXIETY QUESTIONNAIRES: GAD7 TOTAL SCORE: 3

## 2022-09-24 ENCOUNTER — HEALTH MAINTENANCE LETTER (OUTPATIENT)
Age: 32
End: 2022-09-24

## 2022-10-25 ENCOUNTER — MYC MEDICAL ADVICE (OUTPATIENT)
Dept: FAMILY MEDICINE | Facility: CLINIC | Age: 32
End: 2022-10-25

## 2022-10-25 DIAGNOSIS — F64.0 GENDER DYSPHORIA IN ADULT: Primary | ICD-10-CM

## 2022-10-26 NOTE — TELEPHONE ENCOUNTER
Dr. Patel:  Pended gender care referral if appropriate, considering the experience of our two new providers.    The second thing I'd like to ask you about is newer. I've been working with my therapist for the past few years, and he recently gave me an official diagnosis of Gender Dysphoria. I'd like to start hormone replacement therapy in January or February of 2023. Ideally with an endocrinologist who specializes in trans care, if possible.      From what I could find online, [facility] has what they call their Comprehensive Gender Care Program. However, I was very unclear as to how to go about accessing those services, nor what services are available.     Would you be able to help get me in contact with the right people, or at least point me in the correct direction with this?    Will re-relay derm referral info.    [facility]   [address]   [place] [address]   Phone: [phone]     HEATHER Ardon RN  [facility]

## 2022-10-27 ENCOUNTER — TELEPHONE (OUTPATIENT)
Dept: PLASTIC SURGERY | Facility: CLINIC | Age: 32
End: 2022-10-27

## 2022-10-27 NOTE — TELEPHONE ENCOUNTER
Writer responded via [employer].    ESSENCE NickersonN, RN-BC  [facility]

## 2022-10-27 NOTE — TELEPHONE ENCOUNTER
[facility] Call Center    Phone Message    May a detailed message be left on voicemail: yes     Reason for Call: Other: pt looking to schedule from order - please call pt back regarding scheduling      Action Taken: Message routed to:  [facility] surgery     Travel Screening: Not Applicable

## 2022-10-27 NOTE — TELEPHONE ENCOUNTER
Signed referral but I believe there is quite a wait, so may want to see Breana or Ajit at our clinic.  Can you let them know?        Dr. Chelsey Patel MD / [facility]

## 2022-10-28 NOTE — TELEPHONE ENCOUNTER
Pt asked for referrals for voice therapy, HRT, and laser hair removal. Writer sent referrals via [employer] ([employer]). Writer to also send pt's info to [facility] for waitlist there.

## 2022-12-13 ENCOUNTER — OFFICE VISIT (OUTPATIENT)
Dept: FAMILY MEDICINE | Facility: CLINIC | Age: 32
End: 2022-12-13
Attending: FAMILY MEDICINE
Payer: COMMERCIAL

## 2022-12-13 VITALS
RESPIRATION RATE: 12 BRPM | BODY MASS INDEX: 24.36 KG/M2 | SYSTOLIC BLOOD PRESSURE: 129 MMHG | DIASTOLIC BLOOD PRESSURE: 78 MMHG | TEMPERATURE: 98.9 F | WEIGHT: 155.2 LBS | HEART RATE: 112 BPM | HEIGHT: 67 IN | OXYGEN SATURATION: 96 %

## 2022-12-13 DIAGNOSIS — F64.0 GENDER DYSPHORIA IN ADULT: ICD-10-CM

## 2022-12-13 PROCEDURE — 99214 OFFICE O/P EST MOD 30 MIN: CPT | Performed by: FAMILY MEDICINE

## 2022-12-13 ASSESSMENT — PATIENT HEALTH QUESTIONNAIRE - PHQ9: SUM OF ALL RESPONSES TO PHQ QUESTIONS 1-9: 10

## 2022-12-13 NOTE — PATIENT INSTRUCTIONS
Effects of Feminizing Hormone Therapy: When They Happen      * If you are on feminizing hormones, your body is still able to make sperm, so your partner could still get pregnant. Feminizing hormones are not birth control. Some people choose to stop feminizing hormones with the goal of having a baby. You may then choose to go back to feminizing hormones therapy at any time. The effect of feminizing therapy on the amount and quality of sperm varies and is unknown.   **Electrolysis, laser treatments, or both can remove facial, armpit, and pubic hair. In most cases, these changes are permanent.  **it may be possible to prevent and treat scalp hair loss    Informed Consent Form for Feminizing Medications    This form refers to the use of estrogen, progesterone, and/or androgen antagonists (sometimes called  testosterone suppressant ,  anti-androgens  or  androgen-blockers) by persons who wish to feminize their body. While there are risks associated with taking feminizing medications, when appropriately prescribed they can improve mental health and quality of life. Please seek another opinion if you want additional perspective on any aspect of your care.    Feminizing Effects      I understand that estrogen, progesterone, androgen antagonists, or a combination may be prescribed to feminize my body:  Skin may become softer  Muscle mass may decrease  Body hair may decrease  Fat may redistribute from abdomen to buttocks and thighs    2.     I understand that feminizing effects of estrogen and androgen antagonists can take several months to a year to become noticable, speed and degree of change is unpredictable, and is different for every person     3.     I understand that if I am taking estrogen I will probably develop breasts, and:   Breasts may take several years to develop to their full size.  Even if estrogen is stopped, the breast tissue that has developed will remain.  As soon as breasts start growing, it is  recommended to have an annual brest exam.  There may be milky nipple discharge (galactorrhea). If you develop this, it is advised to check with a doctor to determine the cause.  It is not known if taking estrogen increases the risk of breast cancer.     4.    I understand that the following changes may occur if I stop taking feminizing medications:     Skin may become rougher   Muscle mass increases and there may be an increase in upper body strength  Body hair growth may become more noticable and grow more quickly   Male pattern baldness may develop more quickly, and hair that has already been lost will likely not grow back.  Fat may redistribute back to a more masculine pattern (increased in abdomen, decreased on buttocks/hips/thighs)    5.    I understand that taking feminizing medications will make my testicles produce less testosterone, which can affect my overall sexual function:  Fertility may be impaired, I may become sterile, and going off feminizing medications may not bring back my ability to have biological children. I should consider sperm banking if I desire biological children.  Sperm may not mature, leading to reduced fertility. However, it is still possible to get someone pregnant and contraception should be used if needed.  Testicles may shrink by 25-50%. Testicular examinations are still recommended in young people to check for masses.  The amount of fluid ejactulated may be reduced.  There is typically a decrease in morning and spontaneous erections.  Erections may not be firm enough for penetrative sex.   Libido (sex drive) may decrease.    6.     I understand that there are some aspects of my body that are not significantly changed by feminizing medications  Beard/facial hair may grow more slowly and be less noticeable, but will not go away.  Voice pitch will not rise and speech patterns will not become more feminine (for example, feminine patterns of enunciation, body language, and phonation).    The laryngeal prominence ( Ojn s apple ) will not shrink.    7.     I understand that there are other non-medical options that can be helpful for presenting more feminine, and I may ask for additional resources about these.  Breast forms  Packers and tucking  Speech therapy for adopting feminine vocal patterns  Hair removal     Risks of Feminizing Medications    I understand that the medical effects and safety of feminizing medications are not fully understood, and that there may be long-term risks that are not yet known.     I understand that I am strongly advised not to take more medications than I am prescribed, as this increases health risks. It won t help changes come faster or increase the degree of changes.    I understand that feminizing medications can damage the liver. I have been advised that I should be monitored for possible liver damage as long as I am taking feminizing medications.     I understand that feminizing medications will result in changes that will be noticeable by other people, and that some people in similar circumstances have experienced harassment, discrimination, and violence, while others have lost support of loved ones. I have been advised that referrals can be made for support/counseling if this would be helpful.     I understand that taking estrogen increases the risk of blood clots, which can result in:   pulmonary embolism (blood clot to the lungs), which may cause death  stroke, which may cause permanent brain damage or death  heart attack  chronic leg vein problems    6.   I understand that the risk of blood clots is much worse if I smoke cigarettes, especially over age 40. I understand that the danger is so high that I should stop smoking completely if I start taking estrogen. I can ask my doctor for advice on quitting.    7.   I understand that taking estrogen can increase deposits of fat around my internal organs, which is associated with increased risk for diabetes and  heart disease.    8.   I understand that taking estrogen can cause increased blood pressure, estrogen increases the risk of gallstones, estrogen can cause headaches or migraines and can cause nausea and vomiting. I have been advised that if I develop these, it is recommended that I discuss this with my doctor.    9.   I understand that it is not known if taking estrogen increases the risk of non-cancerous tumors of the pituitary gland in the brain. I have been informed that although this is typically not life-threatening, it can damage vision. I understand that this will be monitored.    10. I have been informed that I am more likely to have dangerous side effects from estrogen if I smoke, I carry extra weight, am over 40 years old, or  have a history of blood clots, high blood pressure, or a family history of breast cancer.    11. I have been informed that if I take too much estrogen, my body may convert it into testosterone, which may slow or stop feminization.    Medical Risks Associated with Androgen Blockers    1.   I have been informed that spironolactone (a testosterone suppressant) affects the balance of water and salts in the kidneys, and that this may:  Increase the amount of urine produced, and I may urinate more frequently for about 2 weeks  Reduce blood pressure  Rarely, cause high levels of potassium in the blood, and this will be monitored    2.   I understand that some androgen antagonists make it more difficult to evaluate the results of PSA (prostate) test. If I am over 50, I should have my prostate evaluated every year.    Prevention of Medical Complications    1.   I agree to take feminizing medications as prescribed and to tell my care provider if I am not happy with the treatment or am experiencing any problems.    2.   I understand that the right dose or type of medication prescribed for me may not be the same as for someone else.    3.   I understand that physical examinations and blood tests  are needed on a regular basis (monthly, at first) to check for negative side effects of feminizing medications.    4.   I understand that feminization medications can interact with other medication (including other sources of hormones), dietary supplements, herbs, alcohol, and street drugs. I understand that being honest with my care provider about what else I am taking will help prevent medical complications that could be life-threatening. I have been informed that I will continue to get medical care no matter what information I share, and health care providers cannot legally disclose information about a patient s drug use to law enforcement.    5.   I understand that some medical conditions make it dangerous to take estrogen or androgen antagonists. I agree to be checked for risky conditions before the decision to start or continue feminizing medication is made.    6.   I understand that I can choose to stop taking feminizing medication at any time, and that it is advised that I do this with the help of my doctor to make sure there are no negative reactions to stopping. I understand that my doctor may suggest I reduce, switch or stop taking feminizing medication, if there are severe health risks that can t be controlled.

## 2022-12-13 NOTE — PROGRESS NOTES
"  Assessment & Plan       Assessment and Plan     Leopoldo  is a 32 year old individual that uses They/Them/Their/Theirs pronouns presents today for interest in feminizing treatment to better align their body with their gender identity. This patient came to this clinic via referral from: Dr. Chelsey Patel at [facility]    Gender Dysphoria diagnosis  By 2023, pt will have had at least 6 months of experiencin. A marked incongruence between one s experienced/expressed gender and 1  or 2  sex characteristics (or, in young pts, anticipated 2  sex characteristics)  3. A strong desire for the 1  and/or 2  sex characteristics of the other gender  4. A strong desire to be of the other gender (or some alternative gender different from one s assigned gender)  5. A strong desire to be treated as the other gender (or some alternative gender)    Majority of visit spent discussing effects of GAHT on fertility and cryopreservation plans.      Educated about 3 parts of evaluation before starting HRT    Physical health - completed this visit, will need labs next visit    Mental health - deferred to next visit    Informed consent - deferred to next visit    Medical safety for hormones    Lacks contraindications to hormonal therapy    Counseling completed today    Fertility plan if starts cross-sex hormones: cryotherapy, trying to figure out \"how much\" to bank. Would like to have 2 kids starting in 2-3 years.     Mental health assessment -    Will be completed by me at next visit     Informed consent process    Yes --- Is able to provide informed consent     Yes --- Likely to take hormones in a responsible manner    Yes --- Discussed the clinical and biochemical monitoring of hormonal changes and the potential impact on reproductive health & fertility      (This assessment is based on the 2011 published Standards of Care for the Health of Transsexual, Transgender, and Gender-Nonconforming People, Version 7, by the " "World Professional Association of Transgender Health. WPATH SOC Guidelines)    ADHD  Current PCP in [place], Vyvanse is running out, has appt with PCP in March 2023, needs bridge until then. Been on vyvanse for 1 year.   Did not have time to fully address this today  - I advised patient to contact PCP, their clinic should have process for bridging or protocols for frequency of appointments.           I spent a total of 31 minutes on the day of the visit.   Time spent doing chart review, history and exam, documentation and further activities per the note       April Vanessa MD        Depression Screening Follow Up    PHQ 12/13/2022   PHQ-9 Total Score 10   Q9: Thoughts of better off dead/self-harm past 2 weeks Not at all       No follow-ups on file.    April Vanessa MD  [facility]    Andrea Mina is a 32 year old, presenting for the following health issues:  Medication Request (Would like to start HRT. Has never had HRT.)      HPI       Leopoldo, they/them  Accompanied by wife, Rosi (she/her)    [facility]  New Patient History and Physical, Gender-Nonconforming Patient      Name: Leopoldo  Pronouns: They/Them/Their/Theirs    Patient has primary care outside of [facility]? Yes: for now  Seeking primary care, hormonal care, surgical care, mental health, pharmacy, care coordination, social work? Hormone for now      Gender Identity       How would you describe your internal gender identity? transfeminine  In an ideal world, what physical characteristics would you want? Typical cis-female. Breasts, butt, hips, thighs, waist, decrase body hair        Gender History       When did you first recognize that your body and identity didn t match?  o July 2022  o July 2022: euphoria from a video game not using \"he\"    What parts of your body or presentation make you feel uncomfortable or cause dysphoria?  o Body hair    Have you ever seen a healthcare " provider for gender-affirming care?   o No  o Previous HRT: NO  o Previous surgeries (where, surgeon name, year, and surgical intervention): None  o Ever had problems with hormone treatment? N/A        Goals of Treatment       Interested in medically, legally, or socially transitioning?   o Yes: all of the above    Desire to have any gender affirming surgery now or in the future?  o Yes: maybe breast augmentation and maybe facial feminization      Support Network       Have you shared this part of your identity with anyone?   o Yes: Rosi (wife), handful of close friends    Do you have a support network or people in your life who help you feel affirmed?   o Yes:     Are you out at work, school or home?   o No  o Wants to know for sure (start hormones and see results) before telling everyone      Social History     Living environment    Who lives in your household?   o Rosi (wife), dog    What do you do?    o Works full time at a [employer]    Has anyone hurt you physically, for example by pushing, hitting, slapping or kicking you or forcing you to have sex?   o Denies    Do you feel threatened or controlled by a partner, ex-partner or anyone in your life?   o Denies    Relationships    How do you describe your sexual or romantic orientation?   o Bisexual    Romantic or sexual partners include:   o ciswomen so far. Current: Rosi.     Current partners number:   o 1    Current partners   o have sperm? No   o able to get pregnant? Yes     Fertility plans?     Likely want to have kids in the next few years. Plans to use [employer] for sperm cryopreservation.      Interest in cryopreservation? Yes     Contraception? nuva ring      Social History     Socioeconomic History     Marital status:      Spouse name: None     Number of children: None     Years of education: None     Highest education level: None   Tobacco Use     Smoking status: Never     Smokeless tobacco: Never   Substance and Sexual Activity      "Alcohol use: Yes     Comment: occ.     Drug use: Never     Sexual activity: Yes     Partners: Female     Birth control/protection: I.U.D.               Surgical History   No past surgical history on file.      Past Medical History     Patient Active Problem List   Diagnosis     Intractable chronic migraine without aura and without status migrainosus     Mild anxiety     Attention deficit hyperactivity disorder (ADHD), predominantly inattentive type     Skin lesion     Dyslexia     Gender dysphoria in adult     No past medical history on file.  Current Outpatient Medications   Medication Sig Dispense Refill     sertraline (ZOLOFT) 100 MG tablet Take 1.5 tablets (150 mg) by mouth daily 135 tablet 3     History   Smoking Status     Never   Smokeless Tobacco     Never     No Known Allergies      Family History     Family History   Problem Relation Age of Onset     Diabetes Mother      Mental Illness Father      Colon Cancer Maternal Grandmother      Arthritis Paternal Grandmother      Mental Illness Sister          Physical Exam         Objective    /78   Pulse 112   Temp 98.9  F (37.2  C) (Oral)   Resp 12   Ht 1.707 m (5' 7.22\")   Wt 70.4 kg (155 lb 3.2 oz)   SpO2 96%   BMI 24.15 kg/m    Body mass index is 24.15 kg/m .  Physical Exam  Vitals reviewed.   Constitutional:       Appearance: Leopoldo Rangel is well-developed and well-nourished.   HENT:      Head: Normocephalic and atraumatic.   Eyes:      Conjunctiva/sclera: Conjunctivae normal.   Cardiovascular:      Rate and Rhythm: Normal rate.   Pulmonary:      Effort: Pulmonary effort is normal. No respiratory distress.   Skin:     General: Skin is warm and dry.      Coloration: Skin is not pale.      Findings: No erythema or rash.                  "

## 2022-12-16 ENCOUNTER — E-VISIT (OUTPATIENT)
Dept: FAMILY MEDICINE | Facility: CLINIC | Age: 32
End: 2022-12-16
Payer: COMMERCIAL

## 2022-12-16 DIAGNOSIS — F90.0 ATTENTION DEFICIT HYPERACTIVITY DISORDER (ADHD), PREDOMINANTLY INATTENTIVE TYPE: Primary | ICD-10-CM

## 2022-12-16 PROCEDURE — 99421 OL DIG E/M SVC 5-10 MIN: CPT | Performed by: FAMILY MEDICINE

## 2022-12-16 RX ORDER — LISDEXAMFETAMINE DIMESYLATE 30 MG/1
30 CAPSULE ORAL DAILY
Qty: 30 CAPSULE | Refills: 0 | Status: SHIPPED | OUTPATIENT
Start: 2023-01-16 | End: 2023-02-15

## 2022-12-16 RX ORDER — LISDEXAMFETAMINE DIMESYLATE 30 MG/1
30 CAPSULE ORAL DAILY
Qty: 30 CAPSULE | Refills: 0 | Status: SHIPPED | OUTPATIENT
Start: 2023-02-16 | End: 2023-03-18

## 2022-12-16 RX ORDER — LISDEXAMFETAMINE DIMESYLATE 30 MG/1
30 CAPSULE ORAL DAILY
Qty: 30 CAPSULE | Refills: 0 | Status: SHIPPED | OUTPATIENT
Start: 2022-12-16 | End: 2023-01-15

## 2023-01-10 ENCOUNTER — OFFICE VISIT (OUTPATIENT)
Dept: FAMILY MEDICINE | Facility: CLINIC | Age: 33
End: 2023-01-10
Payer: COMMERCIAL

## 2023-01-10 VITALS
DIASTOLIC BLOOD PRESSURE: 90 MMHG | RESPIRATION RATE: 18 BRPM | TEMPERATURE: 97.8 F | SYSTOLIC BLOOD PRESSURE: 138 MMHG | OXYGEN SATURATION: 98 % | HEART RATE: 120 BPM

## 2023-01-10 DIAGNOSIS — F64.0 GENDER DYSPHORIA IN ADULT: Primary | ICD-10-CM

## 2023-01-10 LAB
ALBUMIN SERPL BCG-MCNC: 4.8 G/DL (ref 3.5–5.2)
ALP SERPL-CCNC: 65 U/L (ref 35–129)
ALT SERPL W P-5'-P-CCNC: 13 U/L (ref 10–50)
ANION GAP SERPL CALCULATED.3IONS-SCNC: 13 MMOL/L (ref 7–15)
AST SERPL W P-5'-P-CCNC: 30 U/L (ref 10–50)
BILIRUB SERPL-MCNC: 0.3 MG/DL
BUN SERPL-MCNC: 8.6 MG/DL (ref 6–20)
CALCIUM SERPL-MCNC: 9.9 MG/DL (ref 8.6–10)
CHLORIDE SERPL-SCNC: 103 MMOL/L (ref 98–107)
CHOLEST SERPL-MCNC: 185 MG/DL
CREAT SERPL-MCNC: 0.83 MG/DL (ref 0.51–1.17)
DEPRECATED HCO3 PLAS-SCNC: 24 MMOL/L (ref 22–29)
FASTING STATUS PATIENT QL REPORTED: NORMAL
GFR SERPL CREATININE-BSD FRML MDRD: >90 ML/MIN/1.73M2
GLUCOSE SERPL-MCNC: 98 MG/DL (ref 70–99)
GLUCOSE SERPL-MCNC: 98 MG/DL (ref 70–99)
HDLC SERPL-MCNC: 45 MG/DL
HGB BLD-MCNC: 15.1 G/DL (ref 11.7–17.7)
LDLC SERPL CALC-MCNC: 124 MG/DL
NONHDLC SERPL-MCNC: 140 MG/DL
POTASSIUM SERPL-SCNC: 4.5 MMOL/L (ref 3.4–5.3)
PROT SERPL-MCNC: 7.6 G/DL (ref 6.4–8.3)
SODIUM SERPL-SCNC: 140 MMOL/L (ref 136–145)
TRIGL SERPL-MCNC: 81 MG/DL

## 2023-01-10 PROCEDURE — 84403 ASSAY OF TOTAL TESTOSTERONE: CPT | Performed by: FAMILY MEDICINE

## 2023-01-10 PROCEDURE — 85018 HEMOGLOBIN: CPT | Performed by: FAMILY MEDICINE

## 2023-01-10 PROCEDURE — 80061 LIPID PANEL: CPT | Performed by: FAMILY MEDICINE

## 2023-01-10 PROCEDURE — 80053 COMPREHEN METABOLIC PANEL: CPT | Performed by: FAMILY MEDICINE

## 2023-01-10 PROCEDURE — 36415 COLL VENOUS BLD VENIPUNCTURE: CPT | Performed by: FAMILY MEDICINE

## 2023-01-10 PROCEDURE — 99214 OFFICE O/P EST MOD 30 MIN: CPT | Performed by: FAMILY MEDICINE

## 2023-01-10 ASSESSMENT — ANXIETY QUESTIONNAIRES
6. BECOMING EASILY ANNOYED OR IRRITABLE: SEVERAL DAYS
2. NOT BEING ABLE TO STOP OR CONTROL WORRYING: MORE THAN HALF THE DAYS
5. BEING SO RESTLESS THAT IT IS HARD TO SIT STILL: MORE THAN HALF THE DAYS
GAD7 TOTAL SCORE: 12
3. WORRYING TOO MUCH ABOUT DIFFERENT THINGS: MORE THAN HALF THE DAYS
GAD7 TOTAL SCORE: 12
1. FEELING NERVOUS, ANXIOUS, OR ON EDGE: MORE THAN HALF THE DAYS
7. FEELING AFRAID AS IF SOMETHING AWFUL MIGHT HAPPEN: SEVERAL DAYS
IF YOU CHECKED OFF ANY PROBLEMS ON THIS QUESTIONNAIRE, HOW DIFFICULT HAVE THESE PROBLEMS MADE IT FOR YOU TO DO YOUR WORK, TAKE CARE OF THINGS AT HOME, OR GET ALONG WITH OTHER PEOPLE: SOMEWHAT DIFFICULT

## 2023-01-10 ASSESSMENT — PATIENT HEALTH QUESTIONNAIRE - PHQ9
SUM OF ALL RESPONSES TO PHQ QUESTIONS 1-9: 10
5. POOR APPETITE OR OVEREATING: MORE THAN HALF THE DAYS

## 2023-01-10 NOTE — PATIENT INSTRUCTIONS
Estradiol 2mg once a day for a week, then increase to 2mg twice daily  Spiroolactone 50mg once a day for a week, then increase to 100mg once daily    Labs 1 month after starting spironolactone (BMP, potassium)  Labs 3 months after starting estradiol (all labs)    Patient Education   Here is the plan from today's visit    1. Gender dysphoria in adult    Start Meds:  - estradiol (ESTRACE) 2 MG tablet; Take 1 tablet (2 mg) by mouth daily for 7 days, THEN 1 tablet (2 mg) 2 times daily for 90 days.  Dispense: 187 tablet; Refill: 0  - spironolactone (ALDACTONE) 50 MG tablet; Take 1 tablet (50 mg) by mouth daily for 7 days, THEN 2 tablets (100 mg) daily for 90 days.  Dispense: 187 tablet; Refill: 0    Labs today:  - Testosterone Total  - Comprehensive Metabolic Panel  - Hemoglobin (HGB)  - Lipid Cascade  - Glucose    Future Labs in 1 month  - Basic metabolic panel; Future         Informed Consent Form for Feminizing Medications   This form refers to the use of estrogen and/or androgen antagonists (sometimes called  anti-androgens  or  androgen blockers ) by persons in the male-to-female spectrum who wish to become feminized to reduce gender dysphoria and facilitate a more feminine gender presentation. While there are risks associated with taking feminizing medications, when appropriately prescribed they can greatly improve mental health and quality of life.   Please seek another opinion if you want additional perspective on any aspect of your care.     Feminizing Effects   1. I understand that estrogen, androgen antagonists, or a combination of the two may be   prescribed to reduce male physical features and feminize my body.     2. I understand that the feminizing effects of estrogen and androgen antagonists can take several  months to a year to become noticeable, speed and degree of change is unpredictable.     3.  I understand that if I am taking estrogen I will probably develop breasts, and:    Breasts may take several  years to develop to their full size.    Even if estrogen is stopped, the breast tissue that has developed will remain.    As soon as breasts start growing, it is recommended to have an annual breast exam.   There may be milky nipple discharge (galactorrhea). If you develop this, it is advised to check with a doctor to determine the cause.    It is not known if taking estrogen increases the risk of breast cancer.     4. I understand that the following changes are generally not permanent (that is, they will likely reverse if I stop taking feminizing medications):    Skin may become softer.    Muscle mass decreases and there may be a decrease in upper body strength.    Body hair growth may become less noticeable and grow more slowly,but won't stop.   Male pattern baldness may slow down, but will probably not stop completely, and hair that has already been lost will likely not grow back.    Fat may redistribute to a more feminine pattern (decreased in abdomen, increased on buttocks/hips/thighs - changing from  apple shape  to  pear shape ).     5. I understand that taking feminizing medications will make my testicles produce less testosterone, which can affect my overall sexual function:           Fertility may be impaired, and I should consider sperm banking if I desire biological  children.   Sperm may not mature, leading to reduced fertility. It is still possible to get someone pregnant however and contraception should be used if needed.   Testicles may shrink by 25-50%. Testicular examinations are still recommended.    The amount of fluid ejaculated may be reduced.    There is typically decrease in morning and spontaneous erections.    Erections may not be firm enough for penetrative sex.    Libido (sex drive) may decrease.     6. I understand that there are some aspects of my body that are not significantly changed by feminizing medications, though there may be other treatments that can be helpful.   Beard/facial  hair may grow more slowly and be less noticeable, but will not go away.    Voice pitch will not rise and speech patterns will not become more feminine.    The laryngeal prominence ( Jon s apple ) will not shrink.      Risks of Feminizing Medications     7. I understand that the medical effects and safety of feminizing medications are not fully understood, and that there may be long-term risks that are not yet known.     8. I understand that I am strongly advised not to take more medication than I am prescribed, as this increases health risks. It won't help changes come faster.     9. I understand that feminizing medications can damage the liver. I have been advised that I should be monitored for possible liver damage as long as I am taking feminizing medications.     10. I understand that feminizing medications will result in changes that will be noticeable by other people, and that some people in similar circumstances have experienced harassment, discrimination, and violence, while others have lost support of loved ones. I have been advised that referrals can be made for support/counselling if this would be helpful.     Medical Risks Associated with Estrogen     11. I understand that taking estrogen increases the risk of blood clots, which can result in:    pulmonary embolism (blood clot to the lungs), which may cause death    stroke, which may cause permanent brain damage or death    heart attack    chronic leg vein problems   I understand that the risk of blood clots is much worse if I smoke cigarettes, especially over age 40. I understand that the danger is so high that I should stop smoking completely if I start taking estrogen. I can ask my doctor for advice on quitting.    12. I understand that taking estrogen can increase deposits of fat around my internal organs, which is associated with increased risk for diabetes and heart disease.     13. I understand that taking estrogen can cause increased blood  pressure,  estrogen increases the risk of gallstones,  estrogen can cause headaches or migraines and can cause nausea and vomiting. I have been advised that if I develop these, it is recommended that I discuss this with my doctor.     14. I understand that it is not known if taking estrogen increases the risk of non-cancerous tumors of the pituitary gland. I have been informed that although this is typically not life-threatening, it can damage vision. I understand that this will be monitored.    15. I have been informed that I am more likely to have dangerous side effects from estrogen if I smoke, am overweight, am over 40 years old, or have a history of blood clots, high blood pressure, or a family history of breast cancer.     16. I have been informed that if I take too much estrogen, my body may convert it into testosterone, which may slow or stop feminization.     Medical Risks Associated with Androgen Antagonists     17. I have been informed that spironolactone affects the balance of water and salts in the kidneys, and that this may:    increase the amount of urine produced, and I may urinate more frequently    reduce blood pressure    rarely, cause high levels of potassium in the blood, and this will be monitored    18. I understand that some androgen antagonists make it more difficult to evaluate the results of PSA test. If I am over 50, I should have my prostate evaluated every year.     Prevention of Medical Complications     19. I agree to take feminizing medications as prescribed and to tell my care provider if I am not happy with the treatment or am experiencing any problems.     20. I understand that the right dose or type of medication prescribed for me may not be the same as for someone else.     21. I understand that physical examinations and blood tests are needed on a regular basis (monthly, at first) to check for negative side effects of feminizing medications.     22. I understand that  feminization medications can interact with other medication (including other sources of hormones), dietary supplements, herbs, alcohol, and street drugs. I understand that being honest with my care provider about what else I am taking will help prevent medical complications that could be life-threatening. I have been informed that I will continue to get medical care no matter what information I share.     23. I understand that some medical conditions make it dangerous to take estrogen or androgen antagonists. I agree to be checked for risky conditions before the decision to start or continue feminizing medication is made.     24. I understand that I can choose to stop taking feminizing medication at any time, and that it is advised that I do this with the help of my doctor to make sure there are no negative reactions to stopping. I understand that my doctor may suggest I reduce, switch or stop taking feminizing medication, if there are severe health risks that can t be controlled.    Please call or return to clinic if your symptoms don't go away.    Follow up plan  Return in about 4 weeks (around 2/7/2023) for in person, with Dr. Vanessa, Hormone visit.    Thank you for coming to [facility] today.  Lab Testing:  **If you had lab testing today and your results are reassuring or normal they will be mailed to you or sent through "[employer] within 7 days.   **If the lab tests need quick action we will call you with the results.  **If you are having labs done on a different day, please call [phone] to schedule at [facility] or [phone] for other [facility] locations. Labs do not offer walk-in appointments.  The phone number we will call with results is # [phone] (home) . If this is not the best number please call our clinic and change the number.  Medication Refills:  If you need any refills please call your pharmacy and they will contact us.   If you need to  your refill at  a new pharmacy, please contact the new pharmacy directly. The new pharmacy will help you get your medications transferred faster.   Scheduling:  If you have any concerns about today's visit or wish to schedule another appointment please call our office during normal business hours [phone] (8-5:00 M-F). If you can no longer make a scheduled visit, please cancel via [employer] or call us to cancel.   If a referral was made to an [facility] specialty provider and you do not get a call from central scheduling, please refer to directions on your visit summary or call our office during normal business hours for assistance.   If a Mammogram was ordered for you at the [facility] call [phone] to schedule or change your appointment.  If you had an XRay/CT/Ultrasound/MRI ordered the number is [phone] to schedule or change your radiology appointment.   [facility] has limited ultrasound appointments available on Wednesdays, if you would like your ultrasound at [facility], please call [phone] to schedule.   Medical Concerns:  If you have urgent medical concerns please call [phone] at any time of the day.    April Vanessa MD

## 2023-01-10 NOTE — PROGRESS NOTES
[facility]  New Patient History and Physical, Gender-Nonconforming Patient    --- INTAKE CONTINUED FROM 12/13/22 ---    Name: Susan  Pronouns: She/they      Assessment and Plan     Leopoldo  is a 32 year old individual that uses They/Them/Their/Theirs pronouns presents today for interest in feminizing treatment to better align their body with their gender identity. This patient came to this clinic via referral from: Dr. Chelsey Patel at [facility]    Educated about 3 parts of evaluation before starting HRT    Physical health    Mental health    Informed consent    Medical safety for hormones    Lacks contraindications to hormonal therapy. Sent off first specimen to [facility] for sperm banking, if it looks good may send more.     Medication plan: feminizing hormone therapy with estrogen  and spironolactone    Administration route:  oral    Next labs and exam      Recommended laboratory follow up:  o Initiation: follow up in 1 month or 3 months  o Dose change: follow up in 1 month    Counseling completed today    Contraception: partner has IUD    Fertility Plan: cryopreservation (already started)    Mental health assessment     Completed by me today    Diagnoses are stable and/or are likely to become stabilized by treatment of gender dysphoria    Informed consent process    Yes --- Is able to provide informed consent     Yes --- Likely to take hormones in a responsible manner    Yes --- Discussed physical effects, benefits, and risk assessment & modification    Yes --- Discussed the clinical and biochemical monitoring of hormonal changes and the potential impact on reproductive health & fertility      We discussed thoroughly the risks/benefits/alternatives of this treatment. Questions elicited and answered in reviewing the informed consent document.  Pt is fully prepared to start hormones and is able to reason through risks and management. Questions elicited and answered and patient  verbally consents to hormone treatment.  (This assessment is based on the 2011 published Standards of Care for the Health of Transsexual, Transgender, and Gender-Nonconforming People, Version 7, by the [employer] of Transgender Health. WPATH SOC Guidelines)    Patient Instructions:  Estradiol 2mg once a day for a week, then increase to 2mg twice daily  Spiroolactone 50mg once a day for a week, then increase to 100mg once daily    Labs 1 month after starting spironolactone (BMP, potassium)  Labs 3 months after starting estradiol (all labs)    Patient Education   Here is the plan from today's visit    1. Gender dysphoria in adult    Start Meds:  - estradiol (ESTRACE) 2 MG tablet; Take 1 tablet (2 mg) by mouth daily for 7 days, THEN 1 tablet (2 mg) 2 times daily for 90 days.  Dispense: 187 tablet; Refill: 0  - spironolactone (ALDACTONE) 50 MG tablet; Take 1 tablet (50 mg) by mouth daily for 7 days, THEN 2 tablets (100 mg) daily for 90 days.  Dispense: 187 tablet; Refill: 0    Labs today:  - Testosterone Total  - Comprehensive Metabolic Panel  - Hemoglobin (HGB)  - Lipid Cascade  - Glucose    Future Labs in 1 month  - Basic metabolic panel; Future      April Vanessa MD    I spent a total of 34 minutes on the day of the visit.   Time spent doing chart review, history and exam, documentation and further activities per the note       Depression Screening Follow Up    PHQ 1/10/2023   PHQ-9 Total Score 10   Q9: Thoughts of better off dead/self-harm past 2 weeks Not at all         Follow Up Actions Taken         Return in about 4 weeks (around 2/7/2023) for in person, with Dr. Vanessa, Hormone visit.    April Vanessa MD  [facility] MARY Mina is a 32 year old accompanied by Leopoldo Rangel's spouse, presenting for the following health issues:  RECHECK (HRT care)      HPI     New Patient History and Physical, Gender-Nonconforming Patient    --- INTAKE CONTINUED  FROM 12/13/22 ---        Mental Health Assessment       Have you ever felt depressed because your gender identity and body don t match? Not explicitably, maybe one is feeding the other    Chemical use history: rare alcohol    Mental Health diagnosis history  o Anxiety  o Mild Depression  o ADHD    Mental health hospitalizations: No    History of suicide attempts? No     Current suicidal thoughts? No     Self harm  o Past: No   o Current: No    Non gender related Therapist? Yes: Alban Dacosta, sees about every 2 weeks.     Gender therapist? No     PHQ-9 SCORE 4/26/2022 12/13/2022 1/10/2023   PHQ-9 Total Score MyChart 5 (Mild depression) - -   PHQ-9 Total Score 5 10 10     FERNANDO-7 SCORE 5/24/2022 9/7/2022 1/10/2023   Total Score 11 (moderate anxiety) 3 (minimal anxiety) -   Total Score 11 3 12       Medications prescribed for above and by whom? Sertraline 150mg and Vyvanse prescribed by Dr. Patel (PCP)    PHQ 4/26/2022 12/13/2022 1/10/2023   PHQ-9 Total Score 5 10 10   Q9: Thoughts of better off dead/self-harm past 2 weeks Not at all Not at all Not at all       [unfilled]  FERNANDO-7 SCORE 5/24/2022 9/7/2022 1/10/2023   Total Score 11 (moderate anxiety) 3 (minimal anxiety) -   Total Score 11 3 12         Surgical History   No past surgical history on file.      Past Medical History     Patient Active Problem List   Diagnosis     Intractable chronic migraine without aura and without status migrainosus     Mild anxiety     Attention deficit hyperactivity disorder (ADHD), predominantly inattentive type     Skin lesion     Dyslexia     Gender dysphoria in adult     No past medical history on file.  Current Outpatient Medications   Medication Sig Dispense Refill     estradiol (ESTRACE) 2 MG tablet Take 1 tablet (2 mg) by mouth daily for 7 days, THEN 1 tablet (2 mg) 2 times daily for 90 days. 187 tablet 0     spironolactone (ALDACTONE) 50 MG tablet Take 1 tablet (50 mg) by mouth daily for 7 days, THEN 2 tablets (100 mg)  daily for 90 days. 187 tablet 0     lisdexamfetamine (VYVANSE) 30 MG capsule Take 1 capsule (30 mg) by mouth daily for 30 days 30 capsule 0     [START ON 1/16/2023] lisdexamfetamine (VYVANSE) 30 MG capsule Take 1 capsule (30 mg) by mouth daily for 30 days 30 capsule 0     [START ON 2/16/2023] lisdexamfetamine (VYVANSE) 30 MG capsule Take 1 capsule (30 mg) by mouth daily for 30 days 30 capsule 0     sertraline (ZOLOFT) 100 MG tablet Take 1.5 tablets (150 mg) by mouth daily 135 tablet 3     History   Smoking Status     Never   Smokeless Tobacco     Never     No Known Allergies      Family History   HTN  YES paternal grandfather  History of clots in family (DVT, PE) No     Family History   Problem Relation Age of Onset     Diabetes Mother      Mental Illness Father      Mental Illness Sister      Colon Cancer Maternal Grandmother      Arthritis Paternal Grandmother      Hypertension Paternal Grandfather      Venous thrombosis No family hx of          Review of Systems     CONSTITUTIONAL: NEGATIVE for fever, chills, change in weight  INTEGUMENTARY/SKIN: NEGATIVE for worrisome rashes, moles or lesions  EYES: NEGATIVE for vision changes or irritation  ENT/MOUTH: NEGATIVE for ear, mouth and throat problems  RESP: NEGATIVE for significant cough or SOB  BREAST: NEGATIVE for masses, tenderness or discharge  CV: NEGATIVE for chest pain, palpitations or peripheral edema  GI: NEGATIVE for nausea, abdominal pain, heartburn, or change in bowel habits  : NEGATIVE for frequency, dysuria, or hematuria  MUSCULOSKELETAL: NEGATIVE for significant arthralgias or myalgia  NEURO: NEGATIVE for weakness, dizziness or paresthesias  ENDOCRINE: NEGATIVE for temperature intolerance, skin/hair changes  HEME/ALLERGY: NEGATIVE for bleeding problems  PSYCHIATRIC: NEGATIVE for changes in mood or affect      Physical Exam     Vitals:    01/10/23 1528   BP: (!) 138/90   Pulse: 120   Resp: 18   Temp: 97.8  F (36.6  C)   SpO2: 98%     BMI= There is no  height or weight on file to calculate BMI.           Data     [unfilled]              Objective    BP (!) 138/90   Pulse 120   Temp 97.8  F (36.6  C)   Resp 18   SpO2 98%   There is no height or weight on file to calculate BMI.  Physical Exam  Vitals reviewed.   Constitutional:       Appearance: Leopoldo Rangel is well-developed.   HENT:      Head: Normocephalic and atraumatic.   Eyes:      Conjunctiva/sclera: Conjunctivae normal.   Cardiovascular:      Rate and Rhythm: Normal rate.   Pulmonary:      Effort: Pulmonary effort is normal. No respiratory distress.   Skin:     General: Skin is warm and dry.      Coloration: Skin is not pale.      Findings: No erythema or rash.            Results for orders placed or performed in visit on 01/10/23   Comprehensive Metabolic Panel     Status: Normal   Result Value Ref Range    Sodium 140 136 - 145 mmol/L    Potassium 4.5 3.4 - 5.3 mmol/L    Chloride 103 98 - 107 mmol/L    Carbon Dioxide (CO2) 24 22 - 29 mmol/L    Anion Gap 13 7 - 15 mmol/L    Urea Nitrogen 8.6 6.0 - 20.0 mg/dL    Creatinine 0.83 0.51 - 1.17 mg/dL    Calcium 9.9 8.6 - 10.0 mg/dL    Glucose 98 70 - 99 mg/dL    Alkaline Phosphatase 65 35 - 129 U/L    AST 30 10 - 50 U/L    ALT 13 10 - 50 U/L    Protein Total 7.6 6.4 - 8.3 g/dL    Albumin 4.8 3.5 - 5.2 g/dL    Bilirubin Total 0.3 <=1.2 mg/dL    GFR Estimate >90 >60 mL/min/1.73m2    Narrative    The sex of this patient cannot be reliably determined based on discrepancies in demographics (legal sex, sex assigned at birth, gender identity).  Both male and female reference ranges are provided where applicable.  Careful evaluation of the patient s results as compared to the gender specific reference intervals is required in this setting.    Hemoglobin (HGB)     Status: Normal   Result Value Ref Range    Hemoglobin 15.1 11.7 - 17.7 g/dL    Narrative    The sex of this patient cannot be reliably determined based on discrepancies in demographics (legal sex, sex  assigned at birth, gender identity).  Both male and female reference ranges are provided where applicable.  Careful evaluation of the patient s results as compared to the gender specific reference intervals is required in this setting.    Lipid Cascade     Status: Abnormal   Result Value Ref Range    Cholesterol 185 <200 mg/dL    Triglycerides 81 <150 mg/dL    Direct Measure HDL 45 >=40 mg/dL    LDL Cholesterol Calculated 124 (H) <=100 mg/dL    Non HDL Cholesterol 140 (H) <130 mg/dL    Narrative    Cholesterol  Desirable:  <200 mg/dL    Triglycerides  Normal:  Less than 150 mg/dL  Borderline High:  150-199 mg/dL  High:  200-499 mg/dL  Very High:  Greater than or equal to 500 mg/dL    Direct Measure HDL  Female:  Greater than or equal to 50 mg/dL   Male:  Greater than or equal to 40 mg/dL    LDL Cholesterol  Desirable:  <100mg/dL  Above Desirable:  100-129 mg/dL   Borderline High:  130-159 mg/dL   High:  160-189 mg/dL   Very High:  >= 190 mg/dL    Non HDL Cholesterol  Desirable:  130 mg/dL  Above Desirable:  130-159 mg/dL  Borderline High:  160-189 mg/dL  High:  190-219 mg/dL  Very High:  Greater than or equal to 220 mg/dL   Glucose     Status: None   Result Value Ref Range    Glucose 98 70 - 99 mg/dL    Patient Fasting > 8hrs? Unknown

## 2023-01-12 RX ORDER — SPIRONOLACTONE 50 MG/1
TABLET, FILM COATED ORAL
Qty: 187 TABLET | Refills: 0 | Status: SHIPPED | OUTPATIENT
Start: 2023-01-12 | End: 2023-06-26

## 2023-01-12 RX ORDER — ESTRADIOL 2 MG/1
TABLET ORAL
Qty: 187 TABLET | Refills: 0 | Status: SHIPPED | OUTPATIENT
Start: 2023-01-12 | End: 2023-06-26

## 2023-01-12 NOTE — RESULT ENCOUNTER NOTE
Susan,   All of your labs are normal. Your estradiol and spironolactone have been sent to your pharmacy. If you have any further questions feel free to contact me via [facility] message. I look forward to seeing you back in clinic in 1 month. (Alternatively you could do a lab-only visit followed by a video visit).     Sincerely,   April Vanessa MD

## 2023-01-21 LAB — TESTOST SERPL-MCNC: 381 NG/DL (ref 8–950)

## 2023-01-31 ENCOUNTER — MYC MEDICAL ADVICE (OUTPATIENT)
Dept: FAMILY MEDICINE | Facility: CLINIC | Age: 33
End: 2023-01-31
Payer: COMMERCIAL

## 2023-03-24 ENCOUNTER — MYC MEDICAL ADVICE (OUTPATIENT)
Dept: FAMILY MEDICINE | Facility: CLINIC | Age: 33
End: 2023-03-24
Payer: COMMERCIAL

## 2023-03-24 DIAGNOSIS — F41.9 ANXIETY: ICD-10-CM

## 2023-03-24 DIAGNOSIS — F90.0 ATTENTION DEFICIT HYPERACTIVITY DISORDER (ADHD), PREDOMINANTLY INATTENTIVE TYPE: Primary | ICD-10-CM

## 2023-03-27 ENCOUNTER — MYC MEDICAL ADVICE (OUTPATIENT)
Dept: FAMILY MEDICINE | Facility: CLINIC | Age: 33
End: 2023-03-27
Payer: COMMERCIAL

## 2023-03-29 RX ORDER — LISDEXAMFETAMINE DIMESYLATE 30 MG/1
30 CAPSULE ORAL EVERY MORNING
Qty: 30 CAPSULE | Refills: 0 | Status: SHIPPED | OUTPATIENT
Start: 2023-03-29 | End: 2023-08-03

## 2023-03-29 RX ORDER — SERTRALINE HYDROCHLORIDE 100 MG/1
150 TABLET, FILM COATED ORAL DAILY
Qty: 135 TABLET | Refills: 3 | Status: SHIPPED | OUTPATIENT
Start: 2023-03-29 | End: 2023-05-04

## 2023-04-27 ASSESSMENT — ENCOUNTER SYMPTOMS
ARTHRALGIAS: 0
SHORTNESS OF BREATH: 0
HEARTBURN: 0
PARESTHESIAS: 0
COUGH: 0
CHILLS: 0
PALPITATIONS: 0
EYE PAIN: 0
NAUSEA: 0
MYALGIAS: 0
NERVOUS/ANXIOUS: 0
HEMATURIA: 0
BREAST MASS: 0
CONSTIPATION: 0
WEAKNESS: 0
FREQUENCY: 0
DIZZINESS: 0
HEADACHES: 0
FEVER: 0
SORE THROAT: 0
JOINT SWELLING: 0
HEMATOCHEZIA: 0
DIARRHEA: 0
DYSURIA: 0
ABDOMINAL PAIN: 0

## 2023-05-04 ENCOUNTER — OFFICE VISIT (OUTPATIENT)
Dept: FAMILY MEDICINE | Facility: CLINIC | Age: 33
End: 2023-05-04
Payer: COMMERCIAL

## 2023-05-04 VITALS
BODY MASS INDEX: 23.95 KG/M2 | HEIGHT: 66 IN | WEIGHT: 149 LBS | OXYGEN SATURATION: 96 % | TEMPERATURE: 98.1 F | HEART RATE: 85 BPM | SYSTOLIC BLOOD PRESSURE: 121 MMHG | DIASTOLIC BLOOD PRESSURE: 79 MMHG | RESPIRATION RATE: 16 BRPM

## 2023-05-04 DIAGNOSIS — N52.2 DRUG-INDUCED ERECTILE DYSFUNCTION: ICD-10-CM

## 2023-05-04 DIAGNOSIS — F41.9 ANXIETY: ICD-10-CM

## 2023-05-04 DIAGNOSIS — Z51.81 ENCOUNTER FOR THERAPEUTIC DRUG MONITORING: ICD-10-CM

## 2023-05-04 DIAGNOSIS — F64.0 GENDER DYSPHORIA IN ADULT: ICD-10-CM

## 2023-05-04 DIAGNOSIS — Z00.00 HEALTH CARE MAINTENANCE: Primary | ICD-10-CM

## 2023-05-04 DIAGNOSIS — Z79.899 CONTROLLED SUBSTANCE AGREEMENT SIGNED: ICD-10-CM

## 2023-05-04 DIAGNOSIS — F90.0 ATTENTION DEFICIT HYPERACTIVITY DISORDER (ADHD), PREDOMINANTLY INATTENTIVE TYPE: ICD-10-CM

## 2023-05-04 LAB
AMPHETAMINES UR QL: DETECTED
ANION GAP SERPL CALCULATED.3IONS-SCNC: 10 MMOL/L (ref 7–15)
BARBITURATES UR QL SCN: NOT DETECTED
BENZODIAZ UR QL SCN: NOT DETECTED
BUN SERPL-MCNC: 11 MG/DL (ref 6–20)
BUPRENORPHINE UR QL: NOT DETECTED
CALCIUM SERPL-MCNC: 9.4 MG/DL (ref 8.6–10)
CANNABINOIDS UR QL: NOT DETECTED
CHLORIDE SERPL-SCNC: 102 MMOL/L (ref 98–107)
COCAINE UR QL SCN: NOT DETECTED
CREAT SERPL-MCNC: 0.85 MG/DL (ref 0.51–1.17)
D-METHAMPHET UR QL: NOT DETECTED
DEPRECATED HCO3 PLAS-SCNC: 28 MMOL/L (ref 22–29)
GFR SERPL CREATININE-BSD FRML MDRD: >90 ML/MIN/1.73M2
GLUCOSE SERPL-MCNC: 95 MG/DL (ref 70–99)
METHADONE UR QL SCN: NOT DETECTED
OPIATES UR QL SCN: NOT DETECTED
OXYCODONE UR QL SCN: NOT DETECTED
PCP UR QL SCN: NOT DETECTED
POTASSIUM SERPL-SCNC: 4.3 MMOL/L (ref 3.4–5.3)
PROPOXYPH UR QL: NOT DETECTED
SODIUM SERPL-SCNC: 140 MMOL/L (ref 136–145)
TRICYCLICS UR QL SCN: NOT DETECTED

## 2023-05-04 PROCEDURE — 99213 OFFICE O/P EST LOW 20 MIN: CPT | Mod: 25 | Performed by: FAMILY MEDICINE

## 2023-05-04 PROCEDURE — 99395 PREV VISIT EST AGE 18-39: CPT | Performed by: FAMILY MEDICINE

## 2023-05-04 PROCEDURE — 36415 COLL VENOUS BLD VENIPUNCTURE: CPT | Performed by: FAMILY MEDICINE

## 2023-05-04 PROCEDURE — 80306 DRUG TEST PRSMV INSTRMNT: CPT | Performed by: FAMILY MEDICINE

## 2023-05-04 PROCEDURE — 80048 BASIC METABOLIC PNL TOTAL CA: CPT | Performed by: FAMILY MEDICINE

## 2023-05-04 RX ORDER — LISDEXAMFETAMINE DIMESYLATE 30 MG/1
30 CAPSULE ORAL DAILY
Qty: 30 CAPSULE | Refills: 0 | Status: SHIPPED | OUTPATIENT
Start: 2023-05-04 | End: 2023-06-03

## 2023-05-04 RX ORDER — LISDEXAMFETAMINE DIMESYLATE 30 MG/1
30 CAPSULE ORAL DAILY
Qty: 30 CAPSULE | Refills: 0 | Status: SHIPPED | OUTPATIENT
Start: 2023-07-05 | End: 2023-08-04

## 2023-05-04 RX ORDER — LISDEXAMFETAMINE DIMESYLATE 30 MG/1
30 CAPSULE ORAL DAILY
Qty: 30 CAPSULE | Refills: 0 | Status: SHIPPED | OUTPATIENT
Start: 2023-06-04 | End: 2023-07-04

## 2023-05-04 RX ORDER — LISDEXAMFETAMINE DIMESYLATE 30 MG/1
30 CAPSULE ORAL EVERY MORNING
Qty: 30 CAPSULE | Refills: 0 | Status: CANCELLED | OUTPATIENT
Start: 2023-05-04

## 2023-05-04 RX ORDER — SERTRALINE HYDROCHLORIDE 100 MG/1
150 TABLET, FILM COATED ORAL DAILY
Qty: 135 TABLET | Refills: 3 | Status: SHIPPED | OUTPATIENT
Start: 2023-05-04 | End: 2024-03-19

## 2023-05-04 RX ORDER — SILDENAFIL 50 MG/1
50 TABLET, FILM COATED ORAL DAILY PRN
Qty: 10 EACH | Refills: 11 | Status: SHIPPED | OUTPATIENT
Start: 2023-05-04 | End: 2023-10-04 | Stop reason: ALTCHOICE

## 2023-05-04 ASSESSMENT — ENCOUNTER SYMPTOMS
ARTHRALGIAS: 0
DYSURIA: 0
COUGH: 0
PALPITATIONS: 0
DIZZINESS: 0
NAUSEA: 0
EYE PAIN: 0
ABDOMINAL PAIN: 0
SHORTNESS OF BREATH: 0
CONSTIPATION: 0
JOINT SWELLING: 0
CHILLS: 0
HEADACHES: 0
NERVOUS/ANXIOUS: 0
HEMATURIA: 0
FREQUENCY: 0
MYALGIAS: 0
SORE THROAT: 0
FEVER: 0
DIARRHEA: 0
WEAKNESS: 0

## 2023-05-04 NOTE — PATIENT INSTRUCTIONS
Patient Education   Here is the plan from today's visit    1. Attention deficit hyperactivity disorder (ADHD), predominantly inattentive type  Let me know after 3 months and I will reorder. Visit for ADHD every 6 months needed. Urine tox every 12 months. Controlled substance agreement signed today, will need resigning every 12 months  - Urine Drugs of Abuse Screen Panel 13; Future  - lisdexamfetamine (VYVANSE) 30 MG capsule; Take 1 capsule (30 mg) by mouth daily for 30 days  Dispense: 30 capsule; Refill: 0  - lisdexamfetamine (VYVANSE) 30 MG capsule; Take 1 capsule (30 mg) by mouth daily for 30 days  Dispense: 30 capsule; Refill: 0  - lisdexamfetamine (VYVANSE) 30 MG capsule; Take 1 capsule (30 mg) by mouth daily for 30 days  Dispense: 30 capsule; Refill: 0    2. Anxiety  - sertraline (ZOLOFT) 100 MG tablet; Take 1.5 tablets (150 mg) by mouth daily  Dispense: 135 tablet; Refill: 3    3. Gender dysphoria in adult  - Basic metabolic panel  - Conitnue Spironolactone 100mg daily  - Continue estradiol PO 2mg twice daily  - Hormone labs in 2 months, clinic visit 1 week later.     4. Drug-induced erectile dysfunction  - sildenafil (VIAGRA) 50 MG tablet; Take 1 tablet (50 mg) by mouth daily as needed (Take on empty stomach at least 1 hour before planned sexual intercourse. Decrease to 25mg if side effects. May increase to 100mg if needed.)  Dispense: 10 each; Refill: 11    Exercise goal:  1-2 times per week    Please call or return to clinic if your symptoms don't go away.    Follow up plan  No follow-ups on file.    Thank you for coming to [facility] today.  Lab Testing:  **If you had lab testing today and your results are reassuring or normal they will be mailed to you or sent through [employer] within 7 days.   **If the lab tests need quick action we will call you with the results.  **If you are having labs done on a different day, please call [phone] to schedule at [facility] or [phone] for other [facility] locations. Labs do not offer walk-in appointments.  The phone number we will call with results is # [phone] (home) . If this is not the best number please call our clinic and change the number.  Medication Refills:  If you need any refills please call your pharmacy and they will contact us.   If you need to  your refill at a new pharmacy, please contact the new pharmacy directly. The new pharmacy will help you get your medications transferred faster.   Scheduling:  If you have any concerns about today's visit or wish to schedule another appointment please call our office during normal business hours [phone] (8-5:00 M-F). If you can no longer make a scheduled visit, please cancel via [employer] or call us to cancel.   If a referral was made to an [facility] specialty provider and you do not get a call from central scheduling, please refer to directions on your visit summary or call our office during normal business hours for assistance.   If a Mammogram was ordered for you at the [facility] call [phone] to schedule or change your appointment.  If you had an XRay/CT/Ultrasound/MRI ordered the number is [phone] to schedule or change your radiology appointment.   [facility] has limited ultrasound appointments available on Wednesdays, if you would like your ultrasound at [facility], please call [phone] to schedule.   Medical Concerns:  If you have urgent medical concerns please call [phone] at any time of the day.    April Vanessa MD

## 2023-05-04 NOTE — LETTER
May 4, 2023    Leopoldo Rangel  [address] [address]    [facility]  [address] [place] [facility] [address]  [phone]  Controlled Medication Agreement    Leopoldo Claire  [address]  [facility] [address]         5/4/2023    I understand that my provider is prescribing controlled medication to assist me in managing my chronic pain that has not responded to other treatments.  These medications are intended to decrease pain in order to improve function and/or ability to work.  The risks, benefits, and side effects or these medications have been explained to me and I agree to the following conditions for this type of treatment.    1. I will participate in other treatments (i.e., physical therapy, behavioral therapy, groups,) that my provider recommends.  I will be ready to taper or discontinue medications as other reasonable and effective treatments become available.  I understand I may be expected to see a health psychologist and a physical therapist at the discretion of my physician for ongoing functional assessment.  I will follow through with any recommendations made at this visit.    2. I will take my medications exactly as prescribed and will not change the medication dosage or schedule without my provider s approval.  Refills will not be given if I  run out early .  3. I will keep all regular appointments at the clinic (this includes nurse appointments and appointments with PT or behavioral medicine).  If I have three or more missed or cancelled appointments my medications may be discontinued.  4. I will not request or accept prescriptions for controlled substances from other physicians or individuals for my chronic pain condition.  If I develop another condition that requires the prescription of a controlled medication or if I am hospitalized for any reason, I will inform the clinic within one business day of receiving any treatment  or medications.  5. I will designate one pharmacy where all of my prescriptions will be filled.  6. I will bring in the containers of all controlled medications prescribed each time I see my provider or a nurse even if there is no medication remaining.  This must be the original container from the pharmacy.  7. Refills of controlled medications will be made only during regular office hours, during a scheduled appointment with my provider. On call providers will not refill controlled substances.    8. I am responsible for my prescriptions.  If the medication or prescription is lost or stolen, I understand it will not be replaced.  9. I agree to abstain from all illegal and recreational drugs and will provide urine or blood specimens to monitor my compliance.  10. I will notify my nurse or provider immediately if I become pregnant.  11. I understand that controlled medications can affect my thinking and judgment and may interfere with my ability to drive.  I will not drive if I have this concern and will not drive if any dosage adjustments are made until my body has adjusted.    I understand that if I violate any of the above conditions my prescription medications and/or treatment may be terminated.  If the violation includes obtaining any controlled substances from other healthcare providers or individuals a report may be made to my physician, pharmacy, and other authorities including the police.    I have read this agreement and it has been explained to me.  I fully understand the consequences of violating this agreement.      _________________                             ___________           __________________         Leopoldo Vanessa MD

## 2023-05-04 NOTE — PROGRESS NOTES
"SUBJECTIVE:   CC: Leopoldo is an 32 year old who presents for preventative health visit.       1/10/2023     3:26 PM   Additional Questions   Roomed by belkis   Accompanied by self   Patient has been advised of split billing requirements and indicates understanding: Yes  Healthy Habits:     Getting at least 3 servings of Calcium per day:  Yes    Bi-annual eye exam:  Yes    Dental care twice a year:  Yes    Sleep apnea or symptoms of sleep apnea:  None    Diet:  Regular (no restrictions)    Frequency of exercise:  1 day/week    Duration of exercise:  15-30 minutes    Taking medications regularly:  Yes    Medication side effects:  None    PHQ-2 Total Score: 1    Additional concerns today:  No    Currently exercising once every 2 weeks.   Goal1: 1-2 times per week exercise    Gender Affirming Hormone Therapy:  Hormones started: 3/24/23  Current regimen: 100mg spironolactone, 2mg BID estradiol  Last labs:   Interval history:  1 month ago started  Has noticed softer skin, genital sensitivity changes, breast growth, nipple growth, some face/fat redistribution   Gender affirmation is being sought in these other ways:   Came out to sister already, planning to come out to parents \"soonish\".   Worried about dad having negatie reaction  Had a therapist, planning to reinvigorate therapy sessions with previous therapist       General    Fat redistribution: YES    Weight change: No HEENT         Cardiovascular (CV)    Chest Pains: No    Shortness of breath: No Chest    Decreased exercise tolerance:  No    Breast changes/development: YES     Gastrointestinal (GI)    Abdominal pain: No    Change in appetite: No Skin    Acne or oily skin: No, less oily, softer skin    Change in hair: No     Genitourinary ()    Decreased spontaneous erections: YES, less    Change in libido: YES, slightly lower    New sexual partners: No Musculoskeletal    Leg pain or swelling: No     Psychiatric (Psych)    Depression: No    Anxiety/Panic: YES, " "situational around coming out to family.     Mood:  \"good\", more or less the same, averaging it out               Today's PHQ-2 Score:       4/27/2023     9:18 AM   PHQ-2 ( 1999 [employer])   Q1: Little interest or pleasure in doing things 1   Q2: Feeling down, depressed or hopeless 0   PHQ-2 Score 1   Q1: Little interest or pleasure in doing things Several days   Q2: Feeling down, depressed or hopeless Not at all   PHQ-2 Score 1           Social History     Tobacco Use     Smoking status: Never     Smokeless tobacco: Never   Vaping Use     Vaping status: Not on file   Substance Use Topics     Alcohol use: Yes     Comment: occ.             4/27/2023     9:18 AM   Alcohol Use   Prescreen: >3 drinks/day or >7 drinks/week? No       Last PSA: No results found for: PSA    Reviewed orders with patient. Reviewed health maintenance and updated orders accordingly - Yes      Reviewed and updated as needed this visit by clinical staff   Tobacco  Allergies  Meds  Problems  Med Hx  Surg Hx  Fam Hx          Reviewed and updated as needed this visit by Provider   Tobacco  Allergies  Meds  Problems  Med Hx  Surg Hx  Fam Hx             Review of Systems   Constitutional: Negative for chills and fever.   HENT: Negative for congestion, ear pain, hearing loss and sore throat.    Eyes: Negative for pain and visual disturbance.   Respiratory: Negative for cough and shortness of breath.    Cardiovascular: Negative for chest pain and palpitations.   Gastrointestinal: Negative for abdominal pain, constipation, diarrhea and nausea.   Genitourinary: Negative for dysuria, frequency, genital sores, hematuria and urgency.   Musculoskeletal: Negative for arthralgias, joint swelling and myalgias.   Skin: Negative for rash.   Neurological: Negative for dizziness, weakness and headaches.   Psychiatric/Behavioral: The patient is not nervous/anxious.      OBJECTIVE:   /79 (BP Location: Left arm, Patient Position: Sitting, Cuff Size: " "Adult Regular)   Pulse 85   Temp 98.1  F (36.7  C) (Oral)   Resp 16   Ht 1.676 m (5' 6\")   Wt 67.6 kg (149 lb)   SpO2 96%   BMI 24.05 kg/m      Physical Exam  Vitals reviewed.   Constitutional:       General: Leopoldo Rangel is not in acute distress.     Appearance: Leopoldo Rangel is well-developed. Leopoldo Rangel is not diaphoretic.   HENT:      Head: Normocephalic and atraumatic.   Cardiovascular:      Rate and Rhythm: Normal rate and regular rhythm.      Heart sounds: Normal heart sounds. No murmur heard.  Pulmonary:      Effort: Pulmonary effort is normal. No respiratory distress.      Breath sounds: Normal breath sounds. No wheezing.   Abdominal:      General: Bowel sounds are normal. There is no distension.      Palpations: Abdomen is soft.      Tenderness: There is no abdominal tenderness.   Musculoskeletal:         General: Normal range of motion.   Skin:     General: Skin is warm and dry.      Findings: No erythema or rash.   Neurological:      Mental Status: Leopoldo Rangel is alert.   Psychiatric:         Behavior: Behavior normal.         Thought Content: Thought content normal.         Judgment: Judgment normal.       ASSESSMENT/PLAN:   Leopoldo was seen today for physical.    Diagnoses and all orders for this visit:    Drug-induced erectile dysfunction  -     sildenafil (VIAGRA) 50 MG tablet; Take 1 tablet (50 mg) by mouth daily as needed (Take on empty stomach at least 1 hour before planned sexual intercourse. Decrease to 25mg if side effects. May increase to 100mg if needed.)    Attention deficit hyperactivity disorder (ADHD), predominantly inattentive type  Let me know after 3 months and I will reorder. Visit for ADHD every 6 months needed. Urine tox every 12 months. Controlled substance agreement signed today, will need resigning every 12 months  -     Urine Drugs of Abuse Screen Panel 13; Future  -     lisdexamfetamine (VYVANSE) 30 MG capsule; Take 1 capsule (30 mg) by mouth daily for 30 days  -   "   lisdexamfetamine (VYVANSE) 30 MG capsule; Take 1 capsule (30 mg) by mouth daily for 30 days  -     lisdexamfetamine (VYVANSE) 30 MG capsule; Take 1 capsule (30 mg) by mouth daily for 30 days  -     Urine Drugs of Abuse Screen Panel 13  Controlled substance agreement signed  -     Urine Drugs of Abuse Screen Panel 13; Future  -     Urine Drugs of Abuse Screen Panel 13    Anxiety  Comments:  Since college.  Sertraline works well.  Refilled today.  Orders:  -     sertraline (ZOLOFT) 100 MG tablet; Take 1.5 tablets (150 mg) by mouth daily    Gender dysphoria in adult  Encounter for therapeutic drug monitoring  -     Basic metabolic panel  -     Testosterone total; Future  -     Comprehensive Metabolic Panel; Future  -     Hemoglobin (HGB); Future  -     Lipid Cascade; Future  -     Estradiol Ultrasensitive; Future    Exercise goal:  1-2 times per week    Patient has been advised of split billing requirements and indicates understanding: Yes      COUNSELING:   Reviewed preventive health counseling, as reflected in patient instructions        Leopoldo Alamope reports that Leopoldo Jasmeetpe has never smoked. Leopoldo Rangel has never used smokeless tobacco.        April Vanessa MD  [facility]  Answers for HPI/ROS submitted by the patient on 4/27/2023  Blood in stool: No  heartburn: No  peripheral edema: No  mood changes: No  Skin sensation changes: No  pelvic pain: No  vaginal bleeding: No  vaginal discharge: No  tenderness: Yes  breast mass: No  breast discharge: No  impotence: Yes  penile discharge: No

## 2023-05-05 NOTE — RESULT ENCOUNTER NOTE
Your labs are normal. Potassium level is normal. Urine labs are as expected.  If you have any further questions feel free to contact me via [employer] message.     Sincerely,   April Vanessa MD

## 2023-06-13 ENCOUNTER — LAB (OUTPATIENT)
Dept: LAB | Facility: CLINIC | Age: 33
End: 2023-06-13
Payer: COMMERCIAL

## 2023-06-13 DIAGNOSIS — Z51.81 ENCOUNTER FOR THERAPEUTIC DRUG MONITORING: ICD-10-CM

## 2023-06-13 DIAGNOSIS — F64.0 GENDER DYSPHORIA IN ADULT: ICD-10-CM

## 2023-06-13 LAB
ALBUMIN SERPL BCG-MCNC: 4.5 G/DL (ref 3.5–5.2)
ALP SERPL-CCNC: 55 U/L (ref 35–129)
ALT SERPL W P-5'-P-CCNC: 13 U/L (ref 0–70)
ANION GAP SERPL CALCULATED.3IONS-SCNC: 11 MMOL/L (ref 7–15)
AST SERPL W P-5'-P-CCNC: 21 U/L (ref 0–45)
BILIRUB SERPL-MCNC: 0.3 MG/DL
BUN SERPL-MCNC: 13.8 MG/DL (ref 6–20)
CALCIUM SERPL-MCNC: 9.6 MG/DL (ref 8.6–10)
CHLORIDE SERPL-SCNC: 103 MMOL/L (ref 98–107)
CHOLEST SERPL-MCNC: 172 MG/DL
CREAT SERPL-MCNC: 0.82 MG/DL (ref 0.51–1.17)
DEPRECATED HCO3 PLAS-SCNC: 24 MMOL/L (ref 22–29)
GFR SERPL CREATININE-BSD FRML MDRD: >90 ML/MIN/1.73M2
GLUCOSE SERPL-MCNC: 97 MG/DL (ref 70–99)
HDLC SERPL-MCNC: 55 MG/DL
HGB BLD-MCNC: 13.9 G/DL (ref 11.7–17.7)
LDLC SERPL CALC-MCNC: 103 MG/DL
NONHDLC SERPL-MCNC: 117 MG/DL
POTASSIUM SERPL-SCNC: 3.9 MMOL/L (ref 3.4–5.3)
PROT SERPL-MCNC: 7.1 G/DL (ref 6.4–8.3)
SODIUM SERPL-SCNC: 138 MMOL/L (ref 136–145)
TRIGL SERPL-MCNC: 68 MG/DL

## 2023-06-13 PROCEDURE — 82670 ASSAY OF TOTAL ESTRADIOL: CPT

## 2023-06-13 PROCEDURE — 80061 LIPID PANEL: CPT

## 2023-06-13 PROCEDURE — 85018 HEMOGLOBIN: CPT

## 2023-06-13 PROCEDURE — 84403 ASSAY OF TOTAL TESTOSTERONE: CPT

## 2023-06-13 PROCEDURE — 36415 COLL VENOUS BLD VENIPUNCTURE: CPT

## 2023-06-13 PROCEDURE — 80053 COMPREHEN METABOLIC PANEL: CPT

## 2023-06-14 LAB — TESTOST SERPL-MCNC: 14 NG/DL (ref 8–950)

## 2023-06-20 LAB — ESTRADIOL SERPL HS-MCNC: 142 PG/ML

## 2023-06-21 NOTE — RESULT ENCOUNTER NOTE
Susan,     The results from your recent testing are normal.   Your cholesterol is normal  Your kidney and liver labs are normal  Your total testosterone is 14. Our goal for feminization is less than 50  Your estradiol level is 142. Our goal is less than 200, because higher than 200 can be associated with an increased risk for a blood clot in your legs or lungs.     If you have any further questions feel free to contact me via [employer] message.     Sincerely,   April Vanessa MD

## 2023-06-26 ENCOUNTER — OFFICE VISIT (OUTPATIENT)
Dept: FAMILY MEDICINE | Facility: CLINIC | Age: 33
End: 2023-06-26
Payer: COMMERCIAL

## 2023-06-26 VITALS
WEIGHT: 147.8 LBS | TEMPERATURE: 98.2 F | HEART RATE: 81 BPM | BODY MASS INDEX: 23.75 KG/M2 | OXYGEN SATURATION: 95 % | HEIGHT: 66 IN | SYSTOLIC BLOOD PRESSURE: 118 MMHG | DIASTOLIC BLOOD PRESSURE: 79 MMHG

## 2023-06-26 DIAGNOSIS — Z51.81 ENCOUNTER FOR THERAPEUTIC DRUG MONITORING: ICD-10-CM

## 2023-06-26 DIAGNOSIS — F64.0 GENDER DYSPHORIA IN ADULT: Primary | ICD-10-CM

## 2023-06-26 PROCEDURE — 99213 OFFICE O/P EST LOW 20 MIN: CPT | Performed by: FAMILY MEDICINE

## 2023-06-26 RX ORDER — ESTRADIOL 2 MG/1
2 TABLET ORAL 2 TIMES DAILY
Qty: 180 TABLET | Refills: 2 | Status: SHIPPED | OUTPATIENT
Start: 2023-06-26 | End: 2024-03-19

## 2023-06-26 RX ORDER — SPIRONOLACTONE 100 MG/1
100 TABLET, FILM COATED ORAL DAILY
Qty: 90 TABLET | Refills: 2 | Status: SHIPPED | OUTPATIENT
Start: 2023-06-26 | End: 2023-10-04

## 2023-06-26 ASSESSMENT — PATIENT HEALTH QUESTIONNAIRE - PHQ9
SUM OF ALL RESPONSES TO PHQ QUESTIONS 1-9: 8
5. POOR APPETITE OR OVEREATING: MORE THAN HALF THE DAYS

## 2023-06-26 ASSESSMENT — ANXIETY QUESTIONNAIRES
7. FEELING AFRAID AS IF SOMETHING AWFUL MIGHT HAPPEN: SEVERAL DAYS
GAD7 TOTAL SCORE: 8
3. WORRYING TOO MUCH ABOUT DIFFERENT THINGS: SEVERAL DAYS
1. FEELING NERVOUS, ANXIOUS, OR ON EDGE: SEVERAL DAYS
6. BECOMING EASILY ANNOYED OR IRRITABLE: SEVERAL DAYS
IF YOU CHECKED OFF ANY PROBLEMS ON THIS QUESTIONNAIRE, HOW DIFFICULT HAVE THESE PROBLEMS MADE IT FOR YOU TO DO YOUR WORK, TAKE CARE OF THINGS AT HOME, OR GET ALONG WITH OTHER PEOPLE: SOMEWHAT DIFFICULT
5. BEING SO RESTLESS THAT IT IS HARD TO SIT STILL: SEVERAL DAYS
2. NOT BEING ABLE TO STOP OR CONTROL WORRYING: SEVERAL DAYS
GAD7 TOTAL SCORE: 8

## 2023-06-26 NOTE — PATIENT INSTRUCTIONS
Patient Education   Here is the plan from today's visit    1. Gender dysphoria in adult  - estradiol (ESTRACE) 2 MG tablet; Take 1 tablet (2 mg) by mouth 2 times daily  Dispense: 180 tablet; Refill: 2  - spironolactone (ALDACTONE) 100 MG tablet; Take 1 tablet (100 mg) by mouth daily  Dispense: 90 tablet; Refill: 2      Please call or return to clinic if your symptoms don't go away.    Follow up plan  Return in about 6 months (around 12/26/2023) for hormone follow up in person with Dr. Vanessa, lab-only visit 1 week before.    Thank you for coming to [facility] today.  Lab Testing:  **If you had lab testing today and your results are reassuring or normal they will be mailed to you or sent through [employer] within 7 days.   **If the lab tests need quick action we will call you with the results.  **If you are having labs done on a different day, please call [phone] to schedule at [facility] or [phone] for other [facility] locations. Labs do not offer walk-in appointments.  The phone number we will call with results is # [phone] (home) . If this is not the best number please call our clinic and change the number.  Medication Refills:  If you need any refills please call your pharmacy and they will contact us.   If you need to  your refill at a new pharmacy, please contact the new pharmacy directly. The new pharmacy will help you get your medications transferred faster.   Scheduling:  If you have any concerns about today's visit or wish to schedule another appointment please call our office during normal business hours [phone] (8-5:00 M-F). If you can no longer make a scheduled visit, please cancel via [employer] or call us to cancel.   If a referral was made to an [facility] specialty provider and you do not get a call from central scheduling, please refer to directions on your visit summary or call our office during normal business hours for assistance.   If a  Mammogram was ordered for you at the Breast Center call [phone] to schedule or change your appointment.  If you had an XRay/CT/Ultrasound/MRI ordered the number is [phone] to schedule or change your radiology appointment.   [facility] has limited ultrasound appointments available on Wednesdays, if you would like your ultrasound at [facility], please call [phone] to schedule.   Medical Concerns:  If you have urgent medical concerns please call [phone] at any time of the day.    April Vanessa MD

## 2023-06-26 NOTE — PROGRESS NOTES
Assessment & Plan     Here is the plan from today's visit    1. Gender dysphoria in adult  Going well. Happy with pace of changes. Labs are perfect. Desires no dose changes.   - estradiol (ESTRACE) 2 MG tablet; Take 1 tablet (2 mg) by mouth 2 times daily  Dispense: 180 tablet; Refill: 2  - spironolactone (ALDACTONE) 100 MG tablet; Take 1 tablet (100 mg) by mouth daily  Dispense: 90 tablet; Refill: 2    Future Labs:  - Testosterone Total  - Comprehensive Metabolic Panel  - Hemoglobin (HGB)  - Lipid Cascade  - Estradiol Ultrasensitive    Follow up plan  Return in about 6 months (around 12/26/2023) for hormone follow up in person with Dr. Vanessa, lab-only visit 1 week before.    I spent a total of 20 minutes on the day of the visit.   Time spent by me doing chart review, history and exam, documentation and further activities per the note         Return in about 6 months (around 12/26/2023) for hormone follow up in person with Dr. Vanessa, lab-only visit 1 week before.    April Vanessa MD  [facility] is a 32 year [facility], presenting for the following health issues:  RECHECK (Pt is here for HRT follow up. Pt is following up on spironolactone and estradiol medications.)        5/4/2023     8:17 AM   Additional Questions   Roomed by Miriam   Accompanied by Self     HPI       Susan is a 32 year old individual that uses pronouns They/Them/Their/Theirs that presents today for follow up of gender affirming hormone therapy. Gender identity: female    Gender Affirming Hormone Therapy:  Hormones started: 3/24/23  Current regimen: 100mg spironolactone, 2mg BID estradiol  Last labs: 6/13/23 K=3.9, TT=14, estradiol = 142  Interval history: continuing on as it was, more breast growth  Gender affirmation is being sought in these other ways:   Came out to parents, they are having a tough time processing. They are putting in the work. Came out to wifes family this weekend, no big reactions.  "  Has reconnected with previous therapist      Wt Readings from Last 5 Encounters:   06/26/23 67 kg (147 lb 12.8 oz)   05/04/23 67.6 kg (149 lb)   12/13/22 70.4 kg (155 lb 3.2 oz)   03/11/22 74.8 kg (165 lb)   07/14/20 70.8 kg (156 lb)          General    Fat redistribution: YES, additional \"butt cushion\"    Weight change: No HEENT    Voice change: No     Cardiovascular (CV)    Chest Pains: No    Shortness of breath: No Chest    Decreased exercise tolerance:  Maybe a little bi    Breast changes/development: YES     Gastrointestinal (GI)    Abdominal pain: No    Change in appetite: No Skin    Acne or oily skin: No    Change in hair: YES. Body hair is thinner and softer. Facial hair is slightly lighter in color     Genitourinary ()    Decreased spontaneous erections: YES    Change in libido: YES, some decrease    New sexual partners: No Musculoskeletal    Leg pain or swelling: No     Psychiatric (Psych)    Mood:  \"good\"    The highs are higher, the lows are lower               Review of Systems         Objective    /79   Pulse 81   Temp 98.2  F (36.8  C) (Oral)   Ht 1.683 m (5' 6.26\")   Wt 67 kg (147 lb 12.8 oz)   SpO2 95%   BMI 23.67 kg/m    Body mass index is 23.67 kg/m .  Physical Exam  Vitals reviewed.   Constitutional:       Appearance: Susan Rappe is well-developed.   HENT:      Head: Normocephalic and atraumatic.   Eyes:      Conjunctiva/sclera: Conjunctivae normal.   Cardiovascular:      Rate and Rhythm: Normal rate.   Pulmonary:      Effort: Pulmonary effort is normal. No respiratory distress.   Skin:     General: Skin is warm and dry.      Coloration: Skin is not pale.      Findings: No erythema or rash.                        "

## 2023-06-29 ENCOUNTER — MYC MEDICAL ADVICE (OUTPATIENT)
Dept: FAMILY MEDICINE | Facility: CLINIC | Age: 33
End: 2023-06-29
Payer: COMMERCIAL

## 2023-06-29 DIAGNOSIS — F64.0 GENDER DYSPHORIA IN ADULT: Primary | ICD-10-CM

## 2023-06-29 RX ORDER — ESTRADIOL 2 MG/1
2 TABLET ORAL 2 TIMES DAILY
Qty: 10 TABLET | Refills: 0 | Status: SHIPPED | OUTPATIENT
Start: 2023-06-29 | End: 2024-03-19

## 2023-07-22 ENCOUNTER — MYC MEDICAL ADVICE (OUTPATIENT)
Dept: FAMILY MEDICINE | Facility: CLINIC | Age: 33
End: 2023-07-22
Payer: COMMERCIAL

## 2023-07-22 DIAGNOSIS — N52.2 DRUG-INDUCED ERECTILE DYSFUNCTION: Primary | ICD-10-CM

## 2023-07-25 RX ORDER — TADALAFIL 2.5 MG/1
10 TABLET ORAL DAILY
Qty: 30 TABLET | Refills: 11 | Status: SHIPPED | OUTPATIENT
Start: 2023-07-25 | End: 2023-10-04

## 2023-08-03 ENCOUNTER — MYC REFILL (OUTPATIENT)
Dept: FAMILY MEDICINE | Facility: CLINIC | Age: 33
End: 2023-08-03
Payer: COMMERCIAL

## 2023-08-03 DIAGNOSIS — F90.0 ATTENTION DEFICIT HYPERACTIVITY DISORDER (ADHD), PREDOMINANTLY INATTENTIVE TYPE: ICD-10-CM

## 2023-08-04 RX ORDER — LISDEXAMFETAMINE DIMESYLATE 30 MG/1
30 CAPSULE ORAL EVERY MORNING
Qty: 30 CAPSULE | Refills: 0 | Status: SHIPPED | OUTPATIENT
Start: 2023-09-03 | End: 2024-03-19

## 2023-08-04 RX ORDER — LISDEXAMFETAMINE DIMESYLATE 30 MG/1
30 CAPSULE ORAL DAILY
Qty: 30 CAPSULE | Refills: 0 | Status: SHIPPED | OUTPATIENT
Start: 2023-08-04 | End: 2024-03-19

## 2023-08-04 RX ORDER — LISDEXAMFETAMINE DIMESYLATE 30 MG/1
30 CAPSULE ORAL EVERY MORNING
Qty: 30 CAPSULE | Refills: 0 | Status: SHIPPED | OUTPATIENT
Start: 2023-10-03 | End: 2024-03-19

## 2023-08-04 NOTE — TELEPHONE ENCOUNTER
"Last seen 6/26/23    Request for medication refill:  lisdexamfetamine (VYVANSE) 30 MG capsule     Providers if patient needs an appointment and you are willing to give a one month supply please refill for one month and  send a letter/MyChart using \".SMILLIMITEDREFILL\" .smillimited and route chart to \"P [facility] \" (Giving one month refill in non controlled medications is strongly recommended before denial)    If refill has been denied, meaning absolutely no refills without visit, please complete the smart phrase \".smirxrefuse\" and route it to the \"P SMI MED REFILLS\"  pool to inform the patient and the pharmacy.    Estefanía Gomez RN      "

## 2023-08-08 ENCOUNTER — TELEPHONE (OUTPATIENT)
Dept: FAMILY MEDICINE | Facility: CLINIC | Age: 33
End: 2023-08-08
Payer: COMMERCIAL

## 2023-08-08 NOTE — TELEPHONE ENCOUNTER
[facility]    Fax: [phone]    Spec: [phone]    MO: [phone]

## 2023-08-10 NOTE — TELEPHONE ENCOUNTER
PA Initiation    Medication: TADALAFIL 2.5 MG PO TABS  Insurance Company: [email or web address] - Phone [phone] Fax [phone]  Pharmacy Filling the Rx: [facility] - [place], [place] - [facility] [address]  Filling Pharmacy Phone: [phone]  Filling Pharmacy Fax: [phone]  Start Date: 8/10/2023

## 2023-08-11 NOTE — TELEPHONE ENCOUNTER
Prior Authorization Approval    Medication: TADALAFIL 2.5 MG PO TABS  Authorization Effective Date: 8/11/2023  Authorization Expiration Date: 8/10/2024  Approved Dose/Quantity: 30/30ds  Reference #: [id number]   Insurance Company: [employer] - Phone [phone] Fax [phone]  Which Pharmacy is filling the prescription: [facility] - [place], [place] - [address]  Pharmacy Notified: Yes  Patient Notified: Yes

## 2023-08-22 NOTE — TELEPHONE ENCOUNTER
FUTURE VISIT INFORMATION      FUTURE VISIT INFORMATION:  Date: 12/14/23  Time: 9:00am  Location: [place]  REFERRAL INFORMATION:  Reason for visit/diagnosis  Vocal feminization therapy for treatment of gender dysphoria     RECORDS REQUESTED FROM:       No recs to collect

## 2023-09-21 ENCOUNTER — LAB (OUTPATIENT)
Dept: LAB | Facility: CLINIC | Age: 33
End: 2023-09-21
Payer: COMMERCIAL

## 2023-09-21 DIAGNOSIS — F64.0 GENDER DYSPHORIA IN ADULT: ICD-10-CM

## 2023-09-21 DIAGNOSIS — Z51.81 ENCOUNTER FOR THERAPEUTIC DRUG MONITORING: ICD-10-CM

## 2023-09-21 LAB
ALBUMIN SERPL BCG-MCNC: 4.6 G/DL (ref 3.5–5.2)
ALP SERPL-CCNC: 55 U/L (ref 35–129)
ALT SERPL W P-5'-P-CCNC: 14 U/L (ref 0–70)
ANION GAP SERPL CALCULATED.3IONS-SCNC: 12 MMOL/L (ref 7–15)
AST SERPL W P-5'-P-CCNC: 21 U/L (ref 0–45)
BILIRUB SERPL-MCNC: 0.3 MG/DL
BUN SERPL-MCNC: 13.1 MG/DL (ref 6–20)
CALCIUM SERPL-MCNC: 9.6 MG/DL (ref 8.6–10)
CHLORIDE SERPL-SCNC: 102 MMOL/L (ref 98–107)
CHOLEST SERPL-MCNC: 185 MG/DL
CREAT SERPL-MCNC: 0.89 MG/DL (ref 0.51–1.17)
DEPRECATED HCO3 PLAS-SCNC: 25 MMOL/L (ref 22–29)
EGFRCR SERPLBLD CKD-EPI 2021: >90 ML/MIN/1.73M2
ESTRADIOL SERPL-MCNC: 184 PG/ML
GLUCOSE SERPL-MCNC: 95 MG/DL (ref 70–99)
HDLC SERPL-MCNC: 65 MG/DL
HGB BLD-MCNC: 13.9 G/DL (ref 11.7–17.7)
LDLC SERPL CALC-MCNC: 106 MG/DL
NONHDLC SERPL-MCNC: 120 MG/DL
POTASSIUM SERPL-SCNC: 4.2 MMOL/L (ref 3.4–5.3)
PROT SERPL-MCNC: 7.1 G/DL (ref 6.4–8.3)
SODIUM SERPL-SCNC: 139 MMOL/L (ref 136–145)
TRIGL SERPL-MCNC: 69 MG/DL

## 2023-09-21 PROCEDURE — 85018 HEMOGLOBIN: CPT

## 2023-09-21 PROCEDURE — 84403 ASSAY OF TOTAL TESTOSTERONE: CPT

## 2023-09-21 PROCEDURE — 80061 LIPID PANEL: CPT

## 2023-09-21 PROCEDURE — 80053 COMPREHEN METABOLIC PANEL: CPT

## 2023-09-21 PROCEDURE — 82670 ASSAY OF TOTAL ESTRADIOL: CPT

## 2023-09-21 PROCEDURE — 36415 COLL VENOUS BLD VENIPUNCTURE: CPT

## 2023-09-24 LAB — TESTOST SERPL-MCNC: 9 NG/DL (ref 8–950)

## 2023-09-25 NOTE — RESULT ENCOUNTER NOTE
Susan,   All your labs look good. We can discuss in detail during your clinic visit on 10/4/23  - April Vanessa MD

## 2023-09-27 ENCOUNTER — MYC MEDICAL ADVICE (OUTPATIENT)
Dept: DERMATOLOGY | Facility: CLINIC | Age: 33
End: 2023-09-27
Payer: COMMERCIAL

## 2023-09-27 ENCOUNTER — TELEPHONE (OUTPATIENT)
Dept: DERMATOLOGY | Facility: CLINIC | Age: 33
End: 2023-09-27
Payer: COMMERCIAL

## 2023-09-27 NOTE — TELEPHONE ENCOUNTER
I left VM for patient to call the clinic regarding laser hair reduction. They are next to come off the waitlist. Please schedule a telephone consult with Dr. Damon if they are still interested in pursing treatment. Arnulfo trujillo.

## 2023-09-29 NOTE — TELEPHONE ENCOUNTER
Pt sent [employer] requesting to be contacted again in 2024 due to insurance.

## 2023-10-04 ENCOUNTER — OFFICE VISIT (OUTPATIENT)
Dept: FAMILY MEDICINE | Facility: CLINIC | Age: 33
End: 2023-10-04
Payer: COMMERCIAL

## 2023-10-04 VITALS
HEART RATE: 107 BPM | HEIGHT: 66 IN | WEIGHT: 147 LBS | BODY MASS INDEX: 23.63 KG/M2 | OXYGEN SATURATION: 97 % | DIASTOLIC BLOOD PRESSURE: 85 MMHG | SYSTOLIC BLOOD PRESSURE: 121 MMHG | RESPIRATION RATE: 15 BRPM

## 2023-10-04 DIAGNOSIS — N48.30 PAINFUL ERECTION: Primary | ICD-10-CM

## 2023-10-04 DIAGNOSIS — F90.0 ATTENTION DEFICIT HYPERACTIVITY DISORDER (ADHD), PREDOMINANTLY INATTENTIVE TYPE: ICD-10-CM

## 2023-10-04 DIAGNOSIS — N52.2 DRUG-INDUCED ERECTILE DYSFUNCTION: ICD-10-CM

## 2023-10-04 DIAGNOSIS — F64.0 GENDER DYSPHORIA IN ADULT: ICD-10-CM

## 2023-10-04 PROCEDURE — 99214 OFFICE O/P EST MOD 30 MIN: CPT | Mod: 25 | Performed by: FAMILY MEDICINE

## 2023-10-04 PROCEDURE — 90471 IMMUNIZATION ADMIN: CPT | Performed by: FAMILY MEDICINE

## 2023-10-04 PROCEDURE — 90651 9VHPV VACCINE 2/3 DOSE IM: CPT | Performed by: FAMILY MEDICINE

## 2023-10-04 RX ORDER — TADALAFIL 2.5 MG/1
2.5 TABLET ORAL DAILY
Qty: 30 TABLET | Refills: 11 | COMMUNITY
Start: 2023-10-04 | End: 2023-10-12

## 2023-10-04 RX ORDER — SPIRONOLACTONE 50 MG/1
50 TABLET, FILM COATED ORAL DAILY
Qty: 90 TABLET | Refills: 3 | Status: SHIPPED | OUTPATIENT
Start: 2023-10-04 | End: 2024-03-19 | Stop reason: SINTOL

## 2023-10-04 RX ORDER — LISDEXAMFETAMINE DIMESYLATE 30 MG/1
30 CAPSULE ORAL DAILY
Qty: 30 CAPSULE | Refills: 0 | Status: SHIPPED | OUTPATIENT
Start: 2023-11-04 | End: 2023-12-04

## 2023-10-04 RX ORDER — LISDEXAMFETAMINE DIMESYLATE 30 MG/1
30 CAPSULE ORAL DAILY
Qty: 30 CAPSULE | Refills: 0 | Status: SHIPPED | OUTPATIENT
Start: 2023-12-05 | End: 2024-01-04

## 2023-10-04 RX ORDER — LISDEXAMFETAMINE DIMESYLATE 30 MG/1
30 CAPSULE ORAL DAILY
Qty: 30 CAPSULE | Refills: 0 | Status: SHIPPED | OUTPATIENT
Start: 2023-10-04 | End: 2023-11-03

## 2023-10-04 RX ORDER — KETOCONAZOLE 20 MG/G
CREAM TOPICAL
COMMUNITY
Start: 2023-02-11 | End: 2024-03-19

## 2023-10-04 ASSESSMENT — PATIENT HEALTH QUESTIONNAIRE - PHQ9
10. IF YOU CHECKED OFF ANY PROBLEMS, HOW DIFFICULT HAVE THESE PROBLEMS MADE IT FOR YOU TO DO YOUR WORK, TAKE CARE OF THINGS AT HOME, OR GET ALONG WITH OTHER PEOPLE: SOMEWHAT DIFFICULT
SUM OF ALL RESPONSES TO PHQ QUESTIONS 1-9: 4
SUM OF ALL RESPONSES TO PHQ QUESTIONS 1-9: 4

## 2023-10-04 ASSESSMENT — ANXIETY QUESTIONNAIRES
6. BECOMING EASILY ANNOYED OR IRRITABLE: MORE THAN HALF THE DAYS
4. TROUBLE RELAXING: SEVERAL DAYS
GAD7 TOTAL SCORE: 6
IF YOU CHECKED OFF ANY PROBLEMS ON THIS QUESTIONNAIRE, HOW DIFFICULT HAVE THESE PROBLEMS MADE IT FOR YOU TO DO YOUR WORK, TAKE CARE OF THINGS AT HOME, OR GET ALONG WITH OTHER PEOPLE: SOMEWHAT DIFFICULT
5. BEING SO RESTLESS THAT IT IS HARD TO SIT STILL: SEVERAL DAYS
2. NOT BEING ABLE TO STOP OR CONTROL WORRYING: NOT AT ALL
GAD7 TOTAL SCORE: 6
7. FEELING AFRAID AS IF SOMETHING AWFUL MIGHT HAPPEN: NOT AT ALL
1. FEELING NERVOUS, ANXIOUS, OR ON EDGE: SEVERAL DAYS
3. WORRYING TOO MUCH ABOUT DIFFERENT THINGS: SEVERAL DAYS

## 2023-10-04 ASSESSMENT — PAIN SCALES - GENERAL: PAINLEVEL: NO PAIN (0)

## 2023-10-04 NOTE — PATIENT INSTRUCTIONS
Patient Education   Here is the plan from today's visit    2. Painful erection  Possible Peyronies vs. Medicaiton effect of estradiol & cialis combo  - Adult Urology  Referral; Future    3. Gender dysphoria in adult  Taper down from 100 to 50mg spironolactone. If you want to stop at anypoint, go ahead. We can always try finasteride later as a different androgen-blocker  - spironolactone (ALDACTONE) 50 MG tablet; Take 1 tablet (50 mg) by mouth daily  Dispense: 90 tablet; Refill: 3  - Continue estradiol 2mg twice daily  - Next labs & hormone visit March 2024    4. Attention deficit hyperactivity disorder (ADHD), predominantly inattentive type  Stable, no dose change  - lisdexamfetamine (VYVANSE) 30 MG capsule; Take 1 capsule (30 mg) by mouth daily for 30 days  Dispense: 30 capsule; Refill: 0  - lisdexamfetamine (VYVANSE) 30 MG capsule; Take 1 capsule (30 mg) by mouth daily for 30 days  Dispense: 30 capsule; Refill: 0  - lisdexamfetamine (VYVANSE) 30 MG capsule; Take 1 capsule (30 mg) by mouth daily for 30 days  Dispense: 30 capsule; Refill: 0        Please call or return to clinic if your symptoms don't go away.    Follow up plan  Return in about 6 months (around 4/4/2024).    Thank you for coming to [facility] today.  Lab Testing:  **If you had lab testing today and your results are reassuring or normal they will be mailed to you or sent through [employer] within 7 days.   **If the lab tests need quick action we will call you with the results.  **If you are having labs done on a different day, please call [phone] to schedule at [facility] or [phone] for other [facility] locations. Labs do not offer walk-in appointments.  The phone number we will call with results is # [phone] (home) . If this is not the best number please call our clinic and change the number.  Medication Refills:  If you need any refills please call your pharmacy and they will contact us.   If you  need to  your refill at a new pharmacy, please contact the new pharmacy directly. The new pharmacy will help you get your medications transferred faster.   Scheduling:  If you have any concerns about today's visit or wish to schedule another appointment please call our office during normal business hours [phone] (8-5:00 M-F). If you can no longer make a scheduled visit, please cancel via [employer] or call us to cancel.   If a referral was made to an [facility] specialty provider and you do not get a call from central scheduling, please refer to directions on your visit summary or call our office during normal business hours for assistance.   If a Mammogram was ordered for you at the Breast Center call [phone] to schedule or change your appointment.  If you had an XRay/CT/Ultrasound/MRI ordered the number is [phone] to schedule or change your radiology appointment.   [facility] has limited ultrasound appointments available on Wednesdays, if you would like your ultrasound at [facility], please call [phone] to schedule.   Medical Concerns:  If you have urgent medical concerns please call [phone] at any time of the day.    April Vanessa MD

## 2023-10-04 NOTE — PROGRESS NOTES
Assessment & Plan   Patient Instructions   Patient Education  Here is the plan from today's visit    2. Painful erection  Possible Peyronies vs. Medicaiton effect of estradiol & cialis combo  - Adult Urology  Referral; Future    3. Gender dysphoria in adult  Taper down from 100 to 50mg spironolactone. If you want to stop at anypoint, go ahead. We can always try finasteride later as a different androgen-blocker  - spironolactone (ALDACTONE) 50 MG tablet; Take 1 tablet (50 mg) by mouth daily  Dispense: 90 tablet; Refill: 3  - Continue estradiol 2mg twice daily  - Next labs & hormone visit March 2024    4. Attention deficit hyperactivity disorder (ADHD), predominantly inattentive type  Stable, no dose change  - lisdexamfetamine (VYVANSE) 30 MG capsule; Take 1 capsule (30 mg) by mouth daily for 30 days  Dispense: 30 capsule; Refill: 0  - lisdexamfetamine (VYVANSE) 30 MG capsule; Take 1 capsule (30 mg) by mouth daily for 30 days  Dispense: 30 capsule; Refill: 0  - lisdexamfetamine (VYVANSE) 30 MG capsule; Take 1 capsule (30 mg) by mouth daily for 30 days  Dispense: 30 capsule; Refill: 0      I spent a total of 36 minutes on the day of the visit.   Time spent by me doing chart review, history and exam, documentation and further activities per the note           Return in about 6 months (around 4/4/2024).    April Vanessa MD  [facility]    Andrea Davis is a 33 year old, presenting for the following health issues:  Follow Up        6/26/2023     2:58 PM   Additional Questions   Roomed by flor bolanos   Accompanied by self       HPI       Susan is a 33 year old individual that uses pronouns She/Her/Hers/Herself that presents today for follow up of gender affirming hormone therapy. Gender identity: transfemale.    GAHT  Start 3/24/23  Meds 100mg spironolactone, 2mg BID estradiol  Labs 6/13/23  TT=14, estradiol = 142, K=3.9  Labs 9/21/23 TT=9, Estradiol=184, K=4.2  Interval  "history:   Wondering about stopping loco, feels like it contributes to brain fog. If hydrates properly, has to pee a lot.   Breast growth improved in the last few months    Gender affirmation is being sought in these other ways:   - therapy  - Out at work now       General  Fat redistribution: YES  Weight change: No HEENT       Cardiovascular (CV)  Chest Pains: No  Shortness of breath: No Chest  Decreased exercise tolerance:  YES  Breast changes/development: YES     Gastrointestinal (GI)  Abdominal pain: No  Change in appetite: hungrier Skin  Acne or oily skin: No  Change in hair: body hair is thinning and slowing. 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 Maybe receding hairline is starting to fill in .     Genitourinary ()  No longer having spontaneous erections.   Erections are somewhat painful and there's a divit on one side when erect (left side)  Change in libido: \"hard to say\". Feel less of a need.   New sexual partners: No Musculoskeletal  Leg pain or swelling: No     Psychiatric (Psych)  Depression: No  Anxiety/Panic: No  Mood:  \"it's been good\"               Review of Systems         Objective    /85   Pulse 107   Resp 15   Ht 1.681 m (5' 6.2\")   Wt 66.7 kg (147 lb)   SpO2 97%   BMI 23.58 kg/m    Body mass index is 23.58 kg/m .  Physical Exam  Vitals reviewed.   Constitutional:       Appearance: Normal appearance. She is well-developed.   HENT:      Head: Normocephalic and atraumatic.   Eyes:      Conjunctiva/sclera: Conjunctivae normal.   Cardiovascular:      Rate and Rhythm: Normal rate.   Pulmonary:      Effort: Pulmonary effort is normal. No respiratory distress.   Skin:     General: Skin is warm and dry.      Coloration: Skin is not pale.      Findings: No erythema or rash.   Neurological:      Mental Status: She is alert.   Psychiatric:         Mood and Affect: Mood normal.         Behavior: Behavior normal.                          Answers submitted by the patient for " this visit:  Patient Health Questionnaire (Submitted on 10/4/2023)  If you checked off any problems, how difficult have these problems made it for you to do your work, take care of things at home, or get along with other people?: Somewhat difficult  PHQ9 TOTAL SCORE: 4  FERNANDO-7 (Submitted on 10/4/2023)  FERNANDO 7 TOTAL SCORE: 6

## 2023-10-11 NOTE — TELEPHONE ENCOUNTER
MEDICAL RECORDS REQUEST   [facility]  [facility]  [address] [place] [address]  PHONE: [phone]  Fax: [phone]        FUTURE VISIT INFORMATION                                                   Leopoldo Rangel, : 1990 scheduled for future visit at [facility]    APPOINTMENT INFORMATION:  Date: 2023  Provider:  Chester Torres PA-C  Reason for Visit/Diagnosis: Peyronie's Disease    REFERRAL INFORMATION:  Referring provider:  April Vanessa MD in [facility]      RECORDS REQUESTED FOR VISIT                                                     NOTES  STATUS/DETAILS   OFFICE NOTE from referring provider  yes, 10/04/2023 -- April Vanessa MD in [facility]   MEDICATION LIST  yes   LABS     URINALYSIS (UA)  no     PRE-VISIT CHECKLIST      Joint diagnostic appointment coordinated correctly          (ensure right order & amount of time) Yes   RECORD COLLECTION COMPLETE Yes

## 2023-10-12 ENCOUNTER — MYC MEDICAL ADVICE (OUTPATIENT)
Dept: FAMILY MEDICINE | Facility: CLINIC | Age: 33
End: 2023-10-12
Payer: COMMERCIAL

## 2023-10-12 DIAGNOSIS — N52.2 DRUG-INDUCED ERECTILE DYSFUNCTION: ICD-10-CM

## 2023-10-12 RX ORDER — TADALAFIL 2.5 MG/1
2.5 TABLET ORAL DAILY
Qty: 90 TABLET | Refills: 3 | Status: SHIPPED | OUTPATIENT
Start: 2023-10-12

## 2023-10-12 NOTE — TELEPHONE ENCOUNTER
"Prescription on 10/4 did not appear to go through. The pharmacy was notified to discontinue old does but does not appear they received the new one.     Request for medication refill:    tadalafil (CIALIS) 2.5 MG tablet     Providers if patient needs an appointment and you are willing to give a one month supply please refill for one month and  send a letter/MyChart using \".SMILLIMITEDREFILL\" .smillimited and route chart to \"P SMI \" (Giving one month refill in non controlled medications is strongly recommended before denial)    If refill has been denied, meaning absolutely no refills without visit, please complete the smart phrase \".smirxrefuse\" and route it to the \"P SMI MED REFILLS\"  pool to inform the patient and the pharmacy.    Marlys Orellana RN      "

## 2023-12-14 ENCOUNTER — OFFICE VISIT (OUTPATIENT)
Dept: OTOLARYNGOLOGY | Facility: CLINIC | Age: 33
End: 2023-12-14
Payer: COMMERCIAL

## 2023-12-14 ENCOUNTER — PRE VISIT (OUTPATIENT)
Dept: OTOLARYNGOLOGY | Facility: CLINIC | Age: 33
End: 2023-12-14

## 2023-12-14 DIAGNOSIS — R49.0 DYSPHONIA: Primary | ICD-10-CM

## 2023-12-14 DIAGNOSIS — R49.9 VOICE AND RESONANCE DISORDER: ICD-10-CM

## 2023-12-14 DIAGNOSIS — F64.0 GENDER DYSPHORIA IN ADULT: ICD-10-CM

## 2023-12-14 PROCEDURE — 92507 TX SP LANG VOICE COMM INDIV: CPT | Mod: GN | Performed by: SPEECH-LANGUAGE PATHOLOGIST

## 2023-12-14 PROCEDURE — 92520 LARYNGEAL FUNCTION STUDIES: CPT | Mod: 52 | Performed by: SPEECH-LANGUAGE PATHOLOGIST

## 2023-12-14 PROCEDURE — 92524 BEHAVRAL QUALIT ANALYS VOICE: CPT | Mod: GN | Performed by: SPEECH-LANGUAGE PATHOLOGIST

## 2023-12-14 NOTE — LETTER
12/14/2023       RE: Leopoldo Rangel  [address]  [facility] [address]     Dear Colleague,    Thank you for referring your patient, Leopoldo Rangel, to the [facility] [place] at [facility]. Please see a copy of my visit note below.    [facility]  [facility] - Dept. of Otolaryngology   Evaluation report - IN PERSON APPOINTMENT    Clinician: Mayuri Barton M.M. (voice), M.A., CCC-SLP  Referring physician:  Self  Patient: Leopoldo Rangel  Date of Visit: 12/14/2023    HISTORY    Chief complaint: Martha (legal: Leopoldo) Claire is a 33 year old presenting today for evaluation of dysphonia and voice and resonance disorder in the context of gender dysphoria.      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  Some talking, but not a huge amount.   Not really a montesinos in most daily activities.   Followed some App[employer] lessons, tried some of the exercises, but it ends up sounding off, so she stops    THROAT ISSUES:   Frequent throat clearing and intermittent coughing  Feels like it is always phlegmy, but there is nothing there  Tries to drink water  On antianx meds    RESPIRATION:   No lung issues.   Non smoker of all kinds    SWALLOWING:   No issues    ADDITIONAL:  Reflux: no significant reflux issues.  Only recalls a handful of times where she had trouble.   No surgeries or intubation.    OTHER PERTINENT HISTORY    No past medical history on file.  No past surgical history on file.    OBJECTIVE  PATIENT REPORTED MEASURES  TSEQ captured    Patient Supplied Answers To VHI Questionnaire      12/13/2023    11:06 PM   Voice Handicap Index (VHI-10)   My voice makes it difficult for people to hear me 0   People have difficulty understanding me in a noisy room 0  "  My voice difficulties restrict my personal and social life.  0   I feel left out of conversations because of my voice 0   My voice problem causes me to lose income 0   I feel as though I have to strain to produce voice 0   The clarity of my voice is unpredictable 0   My voice problem upsets me 2   My voice makes me feel handicapped 1   People ask, \"What's wrong with your voice?\" 0   VHI-10 3       Patient Supplied Answers To CSI Questionnaire       No data to display                Patient Supplied Answers To EAT Questionnaire       No data to display                    PERCEPTUAL EVALUATION (CPT 26107)  POSTURE / TENSION/ PALPATION OF THE LARYNGEAL AREA:   upper body    BREATHING:   appears within normal limits and adequate   clavicular elevation on inspiration    LARYNGEAL PALPATION:   firm musculature  tenderness of the thyrohyoid area    VOICE:  Rappe states that today is a typical voice today, with clinician observing voice quality to be characterized as:  Roughness: WNL  Breathiness: WNL  Strain: WNL  Minimal ramirez  Pitch:  F0/Habitual/Conversational speech:  low at C3  Voiced /i/ Max Phonation Time: informally judged as WNL  Voiceless /s/ max: informally judged as WNL  Pitch glide: neurologically normal  Resonance:   Conversational speech:  backward focus of resonance  Loudness  Conversational speech:  WNL  Projected speech:  WNL  Singing vs. Speech:  n/a  GLOBAL ASSESSMENT OF DYSPHONIA: 45/100    COUGH/THROAT CLEARING:  Intermittent throat clearing    LARYNGEAL FUNCTION STUDIES (CPT 62215)  /a/ mean f0 = 149.946 Hz (SD = 5.847)  /i/ mean f0 = 153.764 Hz (SD = 11.078)  Glide:     Lowest f0 = 76.835 Hz      Higest f0 = 1351.323 Hz      Range = 1274.488 Hz   Connected Speech     Mean f0 = 128.235 Hz (SD 21.461)     Median f0 = 124.770 Hz      Lowest f0 = 99.035 Hz      Highest f0 = 482.253 Hz      Speaking Range = 383.218 Hz         LARYNGEAL EXAMINATION  Not warranted    ASSESSMENT / PLAN  IMPRESSIONS: Martha " (legal: Leopoldo) Claire is a 33 year old, presenting today with Laryngeal function studies show significant increase in trans-glottal airflow compared to age and gender matched norms, increased inferred subglottal pressure, and significantly elevated AVQI.  Remarkable findings included:  Perceptual evaluation demonstrated:   Roughness: WNL  Breathiness: WNL  Strain: WNL  Minimal ramirez  Pitch:  F0/Habitual/Conversational speech:  low at C3  Laryngeal exam demonstrated:   Not warranted  Primary complaint of patient today included: Dysphonia and voice and resonance disorder as well as irritable larynx symptoms.  Laryngeal function studies show significant increase in trans-glottal airflow compared to age and gender matched norms, increased inferred subglottal pressure, and elevated AVQI.    Therefore, it was recommended a course of speech therapy to address these concerns.    Speech Therapy is deemed medically necessary to achieve optimal expressive communication, without therapy, Martha will not be able to safely and effectively communicate wants and needs in certain settings, and also would experience significant dysphoria.    RECOMMENDATIONS:   A course of speech therapy is recommended to optimize vocal technique, promote reduced discomfort, effort and fatigue, and help reduce mucosal irritation.  She demonstrates a Good prognosis for improvement given adherence to therapeutic recommendations.   Positive indicators: positive response to therapy probes diagnosis is known to respond to treatment high level of comittment  Negative indicators: none  Research: This patient is not a candidate  DURATION / FREQUENCY: 4 one-hour sessions.  A total of 6-8 sessions may be necessary.    GOALS:  Patient goal:   To improve and maintain a healthy voice quality  To understand the problem and fix it as much as possible  To have a normal and acceptable voice quality  To reduce her cough to acceptable levels    Long-term goal(s): In 6  "months, Ms. Rangel will:  Report resolution of symptoms, and use of optimal voice quality and comfort to meet personal, social, and professional needs, 90% of the time during a typical week of vocal activities    This treatment plan was developed with the patient who agreed with the recommendations.    _______________________________________________________________________  THERAPY NOTE (CPT 58038)  Date of Service: 12/14/2023    SUBJECTIVE / OBJECTIVE:  Please refer to my evaluation report from today's encounter for full details regarding subjective data, patient reported measures, and diagnostic findings.    THERAPEUTIC ACTIVITIES  Semi-Occluded Vocal Tract (SOVT) exercises instructed to reduce laryngeal tension, promote vocal fold pliability, and coordinate respiration and phonation  Sustained /n/ was found to be most facilitating   Sustained phonation, and voice vs. voiceless productions used to promote easy voicing and raise awareness of laryngeal tension  Ascending and descending glides utilized to promote vocal fold pliability  \"Messa di voce\", gradual crescendo and decrescendo to vary medial compression was also utilized to promote vocal fold pliability.  Instructed on the benefits of using these exercises for improved coordination of breath flow with phonation and tissue mobilization.  Instructed on the importance of using these exercises as a warm-up / cool down,  and to re-calibrate the voice throughout the day.    Exercises and techniques for optimal vocal hygiene including:  Systemic hydration, including strategies for increasing daily water intake  Topical hydration - Gargling (saline and plain water), saline nasal irrigation, humidification, steam, guaifenesin to reduce the thickened secretions / laryngeal irritation.    Counseling and Education  Asked many questions about the nature of her symptoms, and I answered all of these thoroughly.    Concepts of an optimal regimen for practice were " instructed.  She should use an interval schedule of practice, with brief periods of practice frequently throughout each day  [place] concepts of volitional practice to facilitate motor learning.  A revised regimen for home practice was instructed.  I provided an AVS of today's therapeutic activities to facilitate practice.    ASSESSMENT/PLAN  PROGRESS TOWARD LONG TERM GOALS:   Adequate progress; too early for objective measures    IMPRESSIONS:  Laryngeal function studies show significant increase in trans-glottal airflow compared to age and gender matched norms, increased inferred subglottal pressure, and significantly elevated AVQI. Ms. Rangel demonstrated good learning of early therapeutic activities.     PLAN: I will see Ms. Rangel in February to continue therapy.    TOTAL SERVICE TIME: 60 minutes  EVALUATION OF VOICE AND RESONANCE (20618)  TREATMENT (81778)  LARYNGEAL FUNCTION STUDIES (23720)  NO CHARGE FACILITY FEE (63127)    Mayuri Barton M.M. (voice) MJONAS, CCC/SLP  Speech-Language Pathologist  I Trained Vocologist  [facility]  [phone]  [email or web address]  Pronouns: she/her      *this report was created in part through the use of computerized dictation software, and though reviewed following completion, some typographic errors may persist.  If there is confusion regarding any of this notes contents, please contact me for clarification        Again, thank you for allowing me to participate in the care of your patient.      Sincerely,    Mayuri Barton, SLP

## 2023-12-14 NOTE — PATIENT INSTRUCTIONS
Mayuri Barton M.M. (voice), M.A., CCC/SLP  Speech-Language Pathologist  [facility] Trained Vocologist  [facility]  [email or web address]   she/her  [email or web address]      Order for today:  Identity affirming voice  [place] passage with sentences  SOVT: N pliability (recording may vary a bit from the worksheet)   Starting pitch: C3     Identity Affirming Voice Training  Your voice plays a distinct role in embodying identity, and consequently, communication can be   a powerful tool for exploring and defining who you are and how people view you. No matter   where you identify on the gender spectrum - transfeminine, transmasculine, gender   nonconforming, genderqueer - the key to changing your communication style is to find the   right combination of speech/voice features that become what I call the three A s: authentic   (what feels true to you), acceptable (what is effective based on the norms of the outside world),   and automatic (what becomes easy to perform with less and less mental effort). Pitch is   typically the first thing many wish to consider, but additional speech features can also be   utilized, such as resonance and intonation. A more feminine voice sounds smaller, softer,   lighter, and more expressive, while a more masculine voice sounds bigger, heavier, and more   [id number].  Regardless of where you are in understanding your identity, speech/voice change is almost   always possible, and even the smallest of changes can make a difference in your life. Don t   avoid or put off your voice if you don t like it! If you are transitioning (to whatever extent), you   need to be willing to be in limbo as you work on it, similar to other parts of transition.   Behavioral voice change usually involves a period of solid practice, experimenting with your   voice in real life, and then letting it settle over time. With help, most people can make changes   that make a  "difference in their lives in terms of comfort, safety, and success in social and   professional situations.  It is also important to also realize that transgender and non-binary people are at risk for voice   problems, since manipulating your speech/voice, particularly when not seeking professional   help, may be done in a way that invites tension and overworking of the vocal folds. Additionally,   all speakers, transgender and cisgender, can be affected by overall health or certain health   conditions, such as acid reflux, environmental allergies, asthma, or even a high amount of   stress.  I provide a perceptual-acoustic speech/voice evaluation, including measurements of your exact   habitual pitch and pitch range. I can help you identify your existing communication profile, and   determine what aspects can be changed or utilized more. In training sessions, I use my trained   ear, pitch software, video, and/or a simulated piano keyboard to give you the critical immediate   feedback you need to learn new techniques and put them into practice. As a health   professional, I take a serious approach to vocal health by considering your overall health, vocal   load, and vocal habits, and I sometimes perform videostroboscopy to rule out any vocal fold   problems that may or may not affect training.    Mayuri Barton MM (voice), MA, CCC-SLP   she/her  Certificate of Vocology  [facility]  (adapted from [facility] voice and speech lab      Apps to consider downloading on your smart phone for therapy:     Voice Analyst [employer]aya ~$13.00    Perfect Piano  - FREE!!!      List of Hz vs. Pitch: [email or web address]      Please also bring a notebook to take your notes during our appointment-thanks!       To Do: Please record with the [place] passage and sentences (send to me, too if you want/ comfortable)  \"Ah\" for 10 sec  \"eee\" for 10 sec  glide from lowest to highest and back down   " "           Additional information:    Trans voice lessons: Pitch and resonance  https://www.youDenatorube.com/watch?v=21ZfGPp-Ves    Finding a voice - Mayuri  https://www.youDenatorube.com/watch?v=n5AYfOgj8uQ     Use the \"perceptions\" handout to help think of goals for your voice (see below)     [facility] biweekly appts; ask for wait list, too  [phone]             Let me know if you have questions! -Mayuri       - 9am virtual  3/13 - 1pm virtual  3/26 - 3pm in-person   - 9am in-person      AM, PM, after meals, and anytime your throat is irritated:         Cup and Bubble Exercises   WHY: Though these exercises seems (and feels) silly they are helpful for a number of reasons. First, they make you use your air generously and consistently, helping you to coordinate your breath and your voice. Second, they lengthen and narrow the vocal tract with the straw. This narrowing (or semi-occlusion in scientific terms) creates back pressure in your throat which has been shown to help the vocal folds vibrate more easily and reduce how hard some of the other muscles are squeezing.   HOW:   With Water - Fill the cup (or bottle) about 2 inches full of water. Blow bubbles through the straw while keeping your voice on. (kind of like making an \"ooooo\" sound through straw).Keep the water bubbling the whole time. That means your air is moving consistently.   Without Water - make sound through the straw (like it's a kazoo). Make sure you are using your air freely. You can hold your hand in front of the straw to test, and you should be able to feel the air moving.   Use the \"w\" sound without the straw. Let your cheeks puff up slightly (like [place]). Maintain the relaxed feeling in your throat while focusing on a sense of buzz at your lips.   After easy sounds listed below speak through the straw (with or without water) using every day phrases and counting to help bridge between the exercises and everyday voice use.   Feel " how open and relaxed your throat feels when you practice these sounds. If the bubbling or air stops or it gets tight, don't sweat it. Just breath, relax, and start again. Across all the exercises make sure you are getting a nice low breath and feeling the steady inward motion of low abdominal muscles when making sound.   Practice 5 times a day for no more than 3 minutes, and whenever you feel fatigued.   Aren't sure if you are doing it right? Ask yourself these three questions:   1. Does it feel easy?   2. Are the bubbles and voice consistent?   3. Do you feel that forward buzz?     What sounds to make:   Single pitches - use any comfortable pitch and sustain the note for as long as it feels free and easy, and your lips or tongue are bubbling.   Sighs - glide from high to low like you are sitting down in a comfortable chair after a long day of work   [place] - Start on a medium pitch and glide up just a bit and back down   On and off - use a comfortable pitch near where you speak. Keep the bubbles moving consistently while turning the voice on and off. Recognize how little work you need to do to get the voice working.     Forward Resonant /n/ sound      WHY:  Though these exercises seems (and feels) silly they are helpful for a number of reasons.  First, they make you use your air generously and consistently, helping you to coordinate your breath and your voice.  Second, they lengthen and narrow the vocal tract.  This narrowing (or semi-occlusion in scientific terms) creates back pressure in your throat which has been shown to help the vocal folds vibrate more easily and reduce how hard some of the other muscles are squeezing.    HOW:    Use a gentle  hunnnn  like you are relaxing into a comfy chair at the end of a long day.   Make sure that your jaw is released (never clenched), and that your tongue is hanging out like a rug on the floor of your mouth  There can be a tiny  h  at the start of it to keep you from  getting too tight.  Start off with the  sigh  pattern listed below.    Feel how open and relaxed your throat feels when you practice these sounds. If it gets tight, don't sweat it.  Just breath, relax, and start again.  Across all the exercises make sure you are getting a nice low breath and feeling the steady inward motion of low abdominal muscles when making sound.      Practice 5 times a day for no more than 3 minutes, and whenever you feel fatigued.    Aren't sure if you are doing it right? Ask yourself these three questions:  Does it feel easy?  Are the bubbles and voice consistent?  Do you feel that forward buzz?            What sounds to make:    Single pitches - use any comfortable pitch and sustain the note for as long as it feels free and easy, and your lips or tongue are bubbling.        Sighs - glide from high to low like you are sitting down in a comfortable chair after a long day of work        [place] - Start on a medium pitch and glide up just a bit and back down

## 2023-12-14 NOTE — PROGRESS NOTES
[facility]  [facility] - Dept. of Otolaryngology   Evaluation report - IN PERSON APPOINTMENT    Clinician: Mayuri Barton M.M. (voice), M.A., CCC-SLP  Referring physician:  Self  Patient: Leopoldo Rangel  Date of Visit: 12/14/2023    HISTORY    Chief complaint: Martha (legal: Leopoldo) Claire is a 33 year old presenting today for evaluation of dysphonia and voice and resonance disorder in the context of gender dysphoria.      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  Some talking, but not a huge amount.   Not really a [place]es[place] in most daily activities.   Followed some [employer] lessons, tried some of the exercises, but it ends up sounding off, so she stops    THROAT ISSUES:   Frequent throat clearing and intermittent coughing  Feels like it is always phlegmy, but there is nothing there  Tries to drink water  On antianx meds    RESPIRATION:   No lung issues.   Non smoker of all kinds    SWALLOWING:   No issues    ADDITIONAL:  Reflux: no significant reflux issues.  Only recalls a handful of times where she had trouble.   No surgeries or intubation.    OTHER PERTINENT HISTORY    No past medical history on file.  No past surgical history on file.    OBJECTIVE  PATIENT REPORTED MEASURES  TSEQ captured    Patient Supplied Answers To VHI Questionnaire      12/13/2023    11:06 PM   Voice Handicap Index (VHI-10)   My voice makes it difficult for people to hear me 0   People have difficulty understanding me in a noisy room 0   My voice difficulties restrict my personal and social life.  0   I feel left out of conversations because of my voice 0   My voice problem causes me to lose income 0   I feel as though I have to strain to produce voice 0   The clarity of my voice is unpredictable 0   My voice problem upsets me 2   My voice  "makes me feel handicapped 1   People ask, \"What's wrong with your voice?\" 0   VHI-10 3       Patient Supplied Answers To CSI Questionnaire       No data to display                Patient Supplied Answers To EAT Questionnaire       No data to display                    PERCEPTUAL EVALUATION (CPT 71002)  POSTURE / TENSION/ PALPATION OF THE LARYNGEAL AREA:   upper body    BREATHING:   appears within normal limits and adequate   clavicular elevation on inspiration    LARYNGEAL PALPATION:   firm musculature  tenderness of the thyrohyoid area    VOICE:  Claire states that today is a typical voice today, with clinician observing voice quality to be characterized as:  Roughness: WNL  Breathiness: WNL  Strain: WNL  Minimal ramirez  Pitch:  F0/Habitual/Conversational speech:  low at C3  Voiced /i/ Max Phonation Time: informally judged as WNL  Voiceless /s/ max: informally judged as WNL  Pitch glide: neurologically normal  Resonance:   Conversational speech:  backward focus of resonance  Loudness  Conversational speech:  WNL  Projected speech:  WNL  Singing vs. Speech:  n/a  GLOBAL ASSESSMENT OF DYSPHONIA: 45/100    COUGH/THROAT CLEARING:  Intermittent throat clearing    LARYNGEAL FUNCTION STUDIES (CPT 04140)  /a/ mean f0 = 149.946 Hz (SD = 5.847)  /i/ mean f0 = 153.764 Hz (SD = 11.078)  Glide:     Lowest f0 = 76.835 Hz      Higest f0 = 1351.323 Hz      Range = 1274.488 Hz   Connected Speech     Mean f0 = 128.235 Hz (SD 21.461)     Median f0 = 124.770 Hz      Lowest f0 = 99.035 Hz      Highest f0 = 482.253 Hz      Speaking Range = 383.218 Hz         LARYNGEAL EXAMINATION  Not warranted    ASSESSMENT / PLAN  IMPRESSIONS: Martha (legal: Holden Rangel is a 33 year old, presenting today with Laryngeal function studies show significant increase in trans-glottal airflow compared to age and gender matched norms, increased inferred subglottal pressure, and significantly elevated AVQI.  Remarkable findings included:  Perceptual evaluation " demonstrated:   Roughness: WNL  Breathiness: WNL  Strain: WNL  Minimal ramirez  Pitch:  F0/Habitual/Conversational speech:  low at C3  Laryngeal exam demonstrated:   Not warranted  Primary complaint of patient today included: Dysphonia and voice and resonance disorder as well as irritable larynx symptoms.  Laryngeal function studies show significant increase in trans-glottal airflow compared to age and gender matched norms, increased inferred subglottal pressure, and elevated AVQI.    Therefore, it was recommended a course of speech therapy to address these concerns.    Speech Therapy is deemed medically necessary to achieve optimal expressive communication, without therapy, Martha will not be able to safely and effectively communicate wants and needs in certain settings, and also would experience significant dysphoria.    RECOMMENDATIONS:   A course of speech therapy is recommended to optimize vocal technique, promote reduced discomfort, effort and fatigue, and help reduce mucosal irritation.  She demonstrates a Good prognosis for improvement given adherence to therapeutic recommendations.   Positive indicators: positive response to therapy probes diagnosis is known to respond to treatment high level of comittment  Negative indicators: none  Research: This patient is not a candidate  DURATION / FREQUENCY: 4 one-hour sessions.  A total of 6-8 sessions may be necessary.    GOALS:  Patient goal:   To improve and maintain a healthy voice quality  To understand the problem and fix it as much as possible  To have a normal and acceptable voice quality  To reduce her cough to acceptable levels    Long-term goal(s): In 6 months, Ms. Rangel will:  Report resolution of symptoms, and use of optimal voice quality and comfort to meet personal, social, and professional needs, 90% of the time during a typical week of vocal activities    This treatment plan was developed with the patient who agreed with the  "recommendations.    _______________________________________________________________________  THERAPY NOTE (CPT 38695)  Date of Service: 12/14/2023    SUBJECTIVE / OBJECTIVE:  Please refer to my evaluation report from today's encounter for full details regarding subjective data, patient reported measures, and diagnostic findings.    THERAPEUTIC ACTIVITIES  Semi-Occluded Vocal Tract (SOVT) exercises instructed to reduce laryngeal tension, promote vocal fold pliability, and coordinate respiration and phonation  Sustained /n/ was found to be most facilitating   Sustained phonation, and voice vs. voiceless productions used to promote easy voicing and raise awareness of laryngeal tension  Ascending and descending glides utilized to promote vocal fold pliability  \"Messa di voce\", gradual crescendo and decrescendo to vary medial compression was also utilized to promote vocal fold pliability.  Instructed on the benefits of using these exercises for improved coordination of breath flow with phonation and tissue mobilization.  Instructed on the importance of using these exercises as a warm-up / cool down,  and to re-calibrate the voice throughout the day.    Exercises and techniques for optimal vocal hygiene including:  Systemic hydration, including strategies for increasing daily water intake  Topical hydration - Gargling (saline and plain water), saline nasal irrigation, humidification, steam, guaifenesin to reduce the thickened secretions / laryngeal irritation.    Counseling and Education  Asked many questions about the nature of her symptoms, and I answered all of these thoroughly.    Concepts of an optimal regimen for practice were instructed.  She should use an interval schedule of practice, with brief periods of practice frequently throughout each day  [place] concepts of volitional practice to facilitate motor learning.  A revised regimen for home practice was instructed.  I provided an AVS of today's therapeutic " activities to facilitate practice.    ASSESSMENT/PLAN  PROGRESS TOWARD LONG TERM GOALS:   Adequate progress; too early for objective measures    IMPRESSIONS:  Laryngeal function studies show significant increase in trans-glottal airflow compared to age and gender matched norms, increased inferred subglottal pressure, and significantly elevated AVQI. Ms. Rangel demonstrated good learning of early therapeutic activities.     PLAN: I will see Ms. Rangel in February to continue therapy.    TOTAL SERVICE TIME: 60 minutes  EVALUATION OF VOICE AND RESONANCE (06429)  TREATMENT (31636)  LARYNGEAL FUNCTION STUDIES (50617)  NO CHARGE FACILITY FEE (33140)    Mayuri Barton M.M. (voice), M.A., CCC/SLP  Speech-Language Pathologist  I Trained Vocologist  [facility]  [phone]  [email or web address]  Pronouns: she/her      *this report was created in part through the use of computerized dictation software, and though reviewed following completion, some typographic errors may persist.  If there is confusion regarding any of this notes contents, please contact me for clarification

## 2024-01-05 ENCOUNTER — TELEPHONE (OUTPATIENT)
Dept: PLASTIC SURGERY | Facility: CLINIC | Age: 34
End: 2024-01-05
Payer: COMMERCIAL

## 2024-01-05 NOTE — CONFIDENTIAL NOTE
Writer called to follow up on pt voicemail requesting FFS and orchiectomy. Writer sent list of FFS referrals via [facility] and added Martha to FFS list if/when we hire a surgeon. Discussed LOS. Pt has one  provider, but will need a second for insurance. Martha to call back when she has these to schedule consult.

## 2024-01-24 ENCOUNTER — MYC REFILL (OUTPATIENT)
Dept: FAMILY MEDICINE | Facility: CLINIC | Age: 34
End: 2024-01-24
Payer: COMMERCIAL

## 2024-01-24 DIAGNOSIS — F90.0 ATTENTION DEFICIT HYPERACTIVITY DISORDER (ADHD), PREDOMINANTLY INATTENTIVE TYPE: ICD-10-CM

## 2024-01-24 RX ORDER — LISDEXAMFETAMINE DIMESYLATE 30 MG/1
30 CAPSULE ORAL EVERY MORNING
Qty: 30 CAPSULE | Refills: 0 | Status: CANCELLED | OUTPATIENT
Start: 2024-01-24

## 2024-01-24 RX ORDER — LISDEXAMFETAMINE DIMESYLATE 30 MG/1
30 CAPSULE ORAL DAILY
Qty: 30 CAPSULE | Refills: 0 | Status: SHIPPED | OUTPATIENT
Start: 2024-03-26 | End: 2024-04-25

## 2024-01-24 RX ORDER — LISDEXAMFETAMINE DIMESYLATE 30 MG/1
30 CAPSULE ORAL DAILY
Qty: 30 CAPSULE | Refills: 0 | Status: SHIPPED | OUTPATIENT
Start: 2024-01-24 | End: 2024-02-23

## 2024-01-24 RX ORDER — LISDEXAMFETAMINE DIMESYLATE 30 MG/1
30 CAPSULE ORAL DAILY
Qty: 30 CAPSULE | Refills: 0 | Status: SHIPPED | OUTPATIENT
Start: 2024-02-24 | End: 2024-03-19

## 2024-01-24 NOTE — TELEPHONE ENCOUNTER
"Request for medication refill:  lisdexamfetamine (VYVANSE) 30 MG capsule     Providers if patient needs an appointment and you are willing to give a one month supply please refill for one month and  send a letter/MyChart using \".SMILLIMITEDREFILL\" .smillimited and route chart to \"[facility] \" (Giving one month refill in non controlled medications is strongly recommended before denial)    If refill has been denied, meaning absolutely no refills without visit, please complete the smart phrase \".smirxrefuse\" and route it to the \"[facility] MED REFILLS\"  pool to inform the patient and the pharmacy.    Aaron Puckett, CMA      "

## 2024-02-12 ENCOUNTER — TELEPHONE (OUTPATIENT)
Dept: PLASTIC SURGERY | Facility: CLINIC | Age: 34
End: 2024-02-12
Payer: COMMERCIAL

## 2024-02-12 NOTE — CONFIDENTIAL NOTE
[facility] :  Care Coordination Note     SITUATION   Martha Rangel (she/her) is a 33 year old adult who is receiving support for:  Care Team  .    BACKGROUND     Pt is scheduled for an orchiectomy consult with Dr. Wright on 3/13/24.     ASSESSMENT     Surgery              CGC Assessment  [facility] [employer] [facility] (CGC) Enrollment: (P) Enrolled  Patient has a therapist: (P) Yes  Letter of support #1: (P) Requested  Letter of support #2: (P) Requested  Surgery being considered: (P) Yes  Orchiectomy: (P) Yes      PLAN          Nursing Interventions:       Follow-up plan:  1. Upload LOS to [employer] [facility].        Lesley Spivey

## 2024-02-21 ENCOUNTER — VIRTUAL VISIT (OUTPATIENT)
Dept: OTOLARYNGOLOGY | Facility: CLINIC | Age: 34
End: 2024-02-21
Payer: COMMERCIAL

## 2024-02-21 DIAGNOSIS — F64.0 GENDER DYSPHORIA IN ADULT: ICD-10-CM

## 2024-02-21 DIAGNOSIS — R49.0 DYSPHONIA: Primary | ICD-10-CM

## 2024-02-21 DIAGNOSIS — R49.9 VOICE AND RESONANCE DISORDER: ICD-10-CM

## 2024-02-21 PROCEDURE — 92507 TX SP LANG VOICE COMM INDIV: CPT | Mod: GN | Performed by: SPEECH-LANGUAGE PATHOLOGIST

## 2024-02-21 NOTE — LETTER
"2/21/2024       RE: Leopoldo Rangel  [address]  [facility] [address]     Dear Colleague,    Thank you for referring your patient, Leopoldo Rangel, to the [facility] VOICE CLINIC [place] [facility]. Please see a copy of my visit note below.    Claire is a 33 year old adult who is being cared for via a billable virtual visit.        The patient/client has been notified and verbally consented to the following statements:   This video visit will be conducted between you and your provider.  If during the course of the call the provider feels a video visit is not appropriate, you will not be charged for this service.    Provider has received verbal consent for billable virtual visit from the patient? Yes    Preferred method for receiving information: [employer]     Call initiated at: 9 AM  Platform used to conduct today's virtual appointment: AM Well Video  Location of provider: Residence  Location of patient: [facility] VOICE [facility]  THERAPY NOTE (CPT 25552)  Patient: Leopoldo Rangel  Date of Service: 2/21/2024  Referring physician: Self  Impressions from most recent evaluation (12/14/2023):  \"IMPRESSIONS:  Laryngeal function studies show significant increase in trans-glottal airflow compared to age and gender matched norms, increased inferred subglottal pressure, and significantly elevated AVQI. Ms. Rangel demonstrated good learning of early therapeutic activities.      SUBJECTIVE:  Since the last appointment, Ms. Rangel reports the following:   Overall she reports that symptoms are stable    OBJECTIVE:  Ms. Rangel presents today with the following:    Voice quality:  Roughness: WNL  Breathiness: WNL  Strain: WNL  Minimal ramirez  Pitch:  F0/Habitual/Conversational speech:  low at C3    PATIENT REPORTED MEASURES:  Patient Supplied Answers To SLP QOL Questionnaire       No data to display              Patient Supplied Answers To VHI Questionnaire      2/18/2024    " "10:14 AM   Voice Handicap Index (VHI-10)   My voice makes it difficult for people to hear me 0   People have difficulty understanding me in a noisy room 1   My voice difficulties restrict my personal and social life.  2   I feel left out of conversations because of my voice 2   My voice problem causes me to lose income 0   I feel as though I have to strain to produce voice 1   The clarity of my voice is unpredictable 2   My voice problem upsets me 3   My voice makes me feel handicapped 1   People ask, \"What's wrong with your voice?\" 0   VHI-10 12         THERAPEUTIC ACTIVITIES  Demonstrated previous exercises.  demonstrated improved technique  appropriate redirection provided  instruction provided for increased level of complexity/difficulty    Exercises to promote optimal respiratory mechanics  she demonstrated clavicular/neck/shoulder involvement in inhalation  Demonstrated difficulty allowing abdominal relaxation for inhalation  Practiced in a  seated  today.  With clinician support, patient was able to demonstrate improved abdominal relaxation and engagement on inhalation  Optimal exhalation using inward engagement of the abdominal wall with no corresponding collapse of the upper chest cavity was trained using the pulsed \"sh\" task  [place] a pulsed breathing exercise; this was helpful  acceptable  improvement in airflow and respiratory mechanics    Exercises in techniques for improved airflow during phonation  Speech material with /ju/ glides was facilitating at the word level.  Progressed to easy onset/ flow and blending phrases  Instructed with a descending  pattern; this was helpful.  [place] techniques to reduce glottal ramirez and improve breath flow; negative practice improved awareness today.    Counseling and Education:  Asked many questions about the nature of her symptoms, and I answered all of these thoroughly.  A revised regimen for home practice was instructed.  I provided an AVS of today's therapeutic " activities to facilitate practice.    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 Ms. Rangel demonstrated good learning of therapeutic activities to optimize respiratory mechanics and increased blood flow with phonation.    PLAN: I will see Ms. Rangel in 3 weeks, at which point we will continue therapy  For practice goals see AVS.     Next Clinic Appt: 3/13/2024    TOTAL SERVICE TIME:   Call Initiated at: 9 AM   call Ended at: 10 AM           CPT Billing Codes:   TREATMENT OF SPEECH, LANGUAGE, VOICE, COMMUNICATION, and/or AUDITORY PROCESSING DISORDER (71780)    Mayuri Barton M.M. (voice), M.A., CCC/SLP  Speech-Language Pathologist  [facility] Trained Vocologist  [facility]  [phone]  [email or web address]  Pronouns: she/her      *this report was created in part through the use of computerized dictation software, and though reviewed following completion, some typographic errors may persist.  If there is confusion regarding any of this notes contents, please contact me for clarification

## 2024-02-21 NOTE — PATIENT INSTRUCTIONS
"After Visit Summary    Patient: Martha Rangel  Date of Visit: 2/21/2024    These notes are also available in your MyChart. Please take a few moments to find them under \"Past Appointments\" in the [employer] system, as Mayuri will start to phase out e-mail communications.    \"Handouts\" that go along with today's order of activities include (below):   Frequency of practice: 2-3x/day unless marked otherwise    Order of today's appointment:  Breathing:   In the morning and evening (twice daily) for 2-5 minutes:   Breathe while lying on your back with your face and knees up. Hands on tummy and chest.  Take a breath in with rounded lips and exhale with a  shhhhh    Inhale  = Inflate; exhale = deflate  Throughout the day (2-3x/day for just a couple minutes) check breathing while keeping shoulders relaxed (riding to and from school, etc.)  Pulsed breathing Exercise (2-3x/day):   3x each  Sh, Sh, Sh...  Now voiced: Bobbi...  3-5x:   - steady pitch   - descending glide (try completing this by transitioning a little higher in range each time.     Breathing Tips:  Tongue behind the lower teeth during inhale  Keep shoulders down and chest relaxed       - Add a little twang/ nasality - bring it up front for the exercises below    - Start a little higher and move a little lower in pitch with the exercise      Phrases for Blending   fall over  go into   put upon   leave on  the only  lose it   see it   the other  win it   do it   not even  that's enough   put on   not any  leave open   down under  cold as   one of   not old   the ice   she's ill   look at   he's ill   then add   The(e) (y)end  yes and  so it   high up  one at   Marina is    Fall_over_the_chair                      Go_(w)into the store  Leave_on_the_lights                     The(e)_(y)only one  See_it_over_there                         The(e)_(y)other day  Do_it_now    Not_even her mom  Put(d)_on_your_shoes         Not_any more  Down_under_the_stairs  Cold_as " "ice  Not_old_enough   the ice is cold  Look_at_the_sky   he's (z)ill with the flu  The_end_of_the_story  yes_and no  High_up_on_the_shelf  one at a time      Practice leaving a message 1-3x.  Watch the \"basement of the voice\" (don't end too low)  Watch for back vowel (oh, ooo, ah)    Careful not to shift focus too far back as you continue to speak, and using um, ah, try hmmm for ease and forward focus.         Mayuri Barton M.M. (voice), M.A., CCC/SLP  Speech-Language Pathologist  [facility] Certified Vocologist  [facility]  [email or web address]  she/her    "

## 2024-02-26 ENCOUNTER — DOCUMENTATION ONLY (OUTPATIENT)
Dept: PLASTIC SURGERY | Facility: CLINIC | Age: 34
End: 2024-02-26
Payer: COMMERCIAL

## 2024-02-26 NOTE — PROGRESS NOTES
[facility] :  Care Coordination Note     SITUATION   Martha Bee (she/her) is a 33 year old adult who is receiving support for:  Care Team  .    BACKGROUND     Two LOS received and meet criteria. Sent message to RNCC to request PA for Orchiectomy from surgeon.     ASSESSMENT     Surgery              CGC Assessment  [employer] (CGC) Enrollment: Enrolled  Patient has a therapist: Yes  Letter of support #1: Received  Letter #1 Date: 01/23/24  Letter of support #2: Received  Letter #2 Date: 02/07/24  Surgery being considered: Yes  Orchiectomy: Yes      PLAN          Nursing Interventions:       Follow-up plan:  Surgeon will PA for Orchiectomy.       JOSE Hills

## 2024-02-28 NOTE — PROGRESS NOTES
"Claire is a 33 year old adult who is being cared for via a billable virtual visit.        The patient/client has been notified and verbally consented to the following statements:   This video visit will be conducted between you and your provider.  If during the course of the call the provider feels a video visit is not appropriate, you will not be charged for this service.    Provider has received verbal consent for billable virtual visit from the patient? Yes    Preferred method for receiving information: [employer]     Call initiated at: 9 AM  Platform used to conduct today's virtual appointment: AM Well Video  Location of provider: Residence  Location of patient: Residence    [facility] VOICE CLINIC  THERAPY NOTE (CPT 83075)  Patient: Leopoldo Rangel  Date of Service: 2/21/2024  Referring physician: Self  Impressions from most recent evaluation (12/14/2023):  \"IMPRESSIONS:  Laryngeal function studies show significant increase in trans-glottal airflow compared to age and gender matched norms, increased inferred subglottal pressure, and significantly elevated AVQI. Ms. Rangel demonstrated good learning of early therapeutic activities.      SUBJECTIVE:  Since the last appointment, Ms. Rangel reports the following:   Overall she reports that symptoms are stable    OBJECTIVE:  Ms. Rangel presents today with the following:    Voice quality:  Roughness: WNL  Breathiness: WNL  Strain: WNL  Minimal ramirez  Pitch:  F0/Habitual/Conversational speech:  low at C3    PATIENT REPORTED MEASURES:  Patient Supplied Answers To SLP QOL Questionnaire       No data to display              Patient Supplied Answers To VHI Questionnaire      2/18/2024    10:14 AM   Voice Handicap Index (VHI-10)   My voice makes it difficult for people to hear me 0   People have difficulty understanding me in a noisy room 1   My voice difficulties restrict my personal and social life.  2   I feel left out of conversations because of my voice 2   My voice problem causes " "me to lose income 0   I feel as though I have to strain to produce voice 1   The clarity of my voice is unpredictable 2   My voice problem upsets me 3   My voice makes me feel handicapped 1   People ask, \"What's wrong with your voice?\" 0   VHI-10 12         THERAPEUTIC ACTIVITIES  Demonstrated previous exercises.  demonstrated improved technique  appropriate redirection provided  instruction provided for increased level of complexity/difficulty    Exercises to promote optimal respiratory mechanics  she demonstrated clavicular/neck/shoulder involvement in inhalation  Demonstrated difficulty allowing abdominal relaxation for inhalation  Practiced in a  seated  today.  With clinician support, patient was able to demonstrate improved abdominal relaxation and engagement on inhalation  Optimal exhalation using inward engagement of the abdominal wall with no corresponding collapse of the upper chest cavity was trained using the pulsed \"sh\" task  [place] a pulsed breathing exercise; this was helpful  acceptable  improvement in airflow and respiratory mechanics    Exercises in techniques for improved airflow during phonation  Speech material with /ju/ glides was facilitating at the word level.  Progressed to easy onset/ flow and blending phrases  Instructed with a descending  pattern; this was helpful.  [place] techniques to reduce glottal ramirez and improve breath flow; negative practice improved awareness today.    Counseling and Education:  Asked many questions about the nature of her symptoms, and I answered all of these thoroughly.  A revised regimen for home practice was instructed.  I provided an AVS of today's therapeutic activities to facilitate practice.    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 Ms. " Claire demonstrated good learning of therapeutic activities to optimize respiratory mechanics and increased blood flow with phonation.    PLAN: I will see Ms. Rangel in 3 weeks, at which point we will continue therapy  For practice goals see LEIF     Next Clinic Appt: 3/13/2024    TOTAL SERVICE TIME:   Call Initiated at: 9 AM   call Ended at: 10 AM           CPT Billing Codes:   TREATMENT OF SPEECH, LANGUAGE, VOICE, COMMUNICATION, and/or AUDITORY PROCESSING DISORDER (17886)    Mayuri Barton M.M. (voice), M.A., CCC/SLP  Speech-Language Pathologist  [place] Trained Vocologist  [facility]  [phone]  [email or web address]  Pronouns: she/her      *this report was created in part through the use of computerized dictation software, and though reviewed following completion, some typographic errors may persist.  If there is confusion regarding any of this notes contents, please contact me for clarification

## 2024-03-04 ENCOUNTER — PRE VISIT (OUTPATIENT)
Dept: UROLOGY | Facility: CLINIC | Age: 34
End: 2024-03-04
Payer: COMMERCIAL

## 2024-03-04 NOTE — TELEPHONE ENCOUNTER
Reason for visit: consult for orchiectomy      Dx/Hx/Sx: gender dysphoria     Records/imaging/labs/orders: in epic     At Rooming: standard

## 2024-03-11 ENCOUNTER — MYC MEDICAL ADVICE (OUTPATIENT)
Dept: FAMILY MEDICINE | Facility: CLINIC | Age: 34
End: 2024-03-11
Payer: COMMERCIAL

## 2024-03-11 DIAGNOSIS — Z51.81 ENCOUNTER FOR THERAPEUTIC DRUG MONITORING: ICD-10-CM

## 2024-03-11 DIAGNOSIS — F64.0 GENDER DYSPHORIA IN ADULT: Primary | ICD-10-CM

## 2024-03-12 ENCOUNTER — DOCUMENTATION ONLY (OUTPATIENT)
Dept: PLASTIC SURGERY | Facility: CLINIC | Age: 34
End: 2024-03-12
Payer: COMMERCIAL

## 2024-03-12 NOTE — PROGRESS NOTES
NEW CONSULT CHART PREP     Pt has orchiectomy consult with Dr Wright on 3/13/24.    Insurance   R [facility] (2 letters of support required)    Letters of support  2 letters of support, reviewed (they meet criteria)    Needs:  Consult on 3/13/24.   Ready for PA (orchiectomy) after consult appointment if appropriate.

## 2024-03-13 ENCOUNTER — VIRTUAL VISIT (OUTPATIENT)
Dept: OTOLARYNGOLOGY | Facility: CLINIC | Age: 34
End: 2024-03-13
Payer: COMMERCIAL

## 2024-03-13 ENCOUNTER — OFFICE VISIT (OUTPATIENT)
Dept: UROLOGY | Facility: CLINIC | Age: 34
End: 2024-03-13
Payer: COMMERCIAL

## 2024-03-13 VITALS
OXYGEN SATURATION: 98 % | HEIGHT: 67 IN | DIASTOLIC BLOOD PRESSURE: 79 MMHG | WEIGHT: 148.5 LBS | SYSTOLIC BLOOD PRESSURE: 126 MMHG | BODY MASS INDEX: 23.31 KG/M2 | HEART RATE: 86 BPM

## 2024-03-13 DIAGNOSIS — R49.9 VOICE AND RESONANCE DISORDER: ICD-10-CM

## 2024-03-13 DIAGNOSIS — F64.0 GENDER DYSPHORIA IN ADULT: ICD-10-CM

## 2024-03-13 DIAGNOSIS — R49.0 DYSPHONIA: Primary | ICD-10-CM

## 2024-03-13 DIAGNOSIS — F64.0 GENDER DYSPHORIA IN ADULT: Primary | ICD-10-CM

## 2024-03-13 PROCEDURE — 92507 TX SP LANG VOICE COMM INDIV: CPT | Mod: GN | Performed by: SPEECH-LANGUAGE PATHOLOGIST

## 2024-03-13 PROCEDURE — 99204 OFFICE O/P NEW MOD 45 MIN: CPT | Performed by: UROLOGY

## 2024-03-13 RX ORDER — CEFAZOLIN SODIUM 2 G/50ML
2 SOLUTION INTRAVENOUS SEE ADMIN INSTRUCTIONS
Status: CANCELLED | OUTPATIENT
Start: 2024-03-13

## 2024-03-13 RX ORDER — CEFAZOLIN SODIUM 2 G/50ML
2 SOLUTION INTRAVENOUS
Status: CANCELLED | OUTPATIENT
Start: 2024-03-13

## 2024-03-13 ASSESSMENT — PAIN SCALES - GENERAL: PAINLEVEL: NO PAIN (0)

## 2024-03-13 NOTE — LETTER
"3/13/2024       RE: Leopoldo Rangel  [address]  [facility] [address]     Dear Colleague,    Thank you for referring your patient, Leopoldo Rangel, to the [facility] UROLOGY CLINIC [facility]. Please see a copy of my visit note below.        Name: Leopoldo Rangel \"Karis"    MRN: [id number]   YOB: 1990                 Chief Complaint:   Gender Dysphoria            History of Present Illness:   Martha is a 33 year old transgender female seen in consultation for gender dysphoria    Patient transitioned starting in early 2023  Preferred pronouns are: she/her/hers  The patient has been on exogenous hormones since: March 2023.  In terms of an intimate relationship and support system the patient is here today with her wife, Tara.  In terms of fertility, the patient: has already completed sperm banking    (New)  The patient has obtained letters of support from two providers. They are adequate and ready for PA.    (Prior Surgery)  The patient has previously undergone no gender surgeries.    Long-term surgical goals for the patient include: orchiectomy now followed by possible FD vaginoplasty in the future, but she is undecided. Just wants to keep her options open for the future    The patient is here today expressing interest in orchiectomy.    The patient has not begun hair removal.         Past Medical History:   No past medical history on file.  Gender dysphoria  Migraine  ADHD  Anxiety  Dyslexia         Past Surgical History:   No past surgical history on file.  No previous surgeries         Social History:     Social History     Tobacco Use    Smoking status: Never    Smokeless tobacco: Never   Substance Use Topics    Alcohol use: Yes     Comment: occ.            Family History:     Family History   Problem Relation Age of Onset    Diabetes Mother     Mental Illness Father     Mental Illness Sister     Colon Cancer Maternal Grandmother  " "   Arthritis Paternal Grandmother     Hypertension Paternal Grandfather     Venous thrombosis No family hx of               Allergies:   No Known Allergies         Medications:     Current Outpatient Medications   Medication Sig    estradiol (ESTRACE) 2 MG tablet Take 1 tablet (2 mg) by mouth 2 times daily    ketoconazole (NIZORAL) 2 % external cream     lisdexamfetamine (VYVANSE) 30 MG capsule Take 1 capsule (30 mg) by mouth daily for 30 days    [START ON 3/26/2024] lisdexamfetamine (VYVANSE) 30 MG capsule Take 1 capsule (30 mg) by mouth daily for 30 days    lisdexamfetamine (VYVANSE) 30 MG capsule Take 1 capsule (30 mg) by mouth every morning    lisdexamfetamine (VYVANSE) 30 MG capsule Take 1 capsule (30 mg) by mouth daily    lisdexamfetamine (VYVANSE) 30 MG capsule Take 1 capsule (30 mg) by mouth every morning    sertraline (ZOLOFT) 100 MG tablet Take 1.5 tablets (150 mg) by mouth daily    tadalafil (CIALIS) 2.5 MG tablet Take 1 tablet (2.5 mg) by mouth daily    estradiol (ESTRACE) 2 MG tablet Take 1 tablet (2 mg) by mouth 2 times daily for 5 days    spironolactone (ALDACTONE) 50 MG tablet Take 1 tablet (50 mg) by mouth daily (Patient not taking: Reported on 3/13/2024)     No current facility-administered medications for this visit.             Review of Systems:    ROS: ROS neg other than the symptoms noted above in the HPI.          Physical Exam:   /79   Pulse 86   Ht 1.695 m (5' 6.73\")   Wt 67.4 kg (148 lb 8 oz)   SpO2 98%   BMI 23.45 kg/m    General: age-appropriate in NAD  HEENT: Head AT/NC, EOMI, CN Grossly intact  Resp: no respiratory distress  : circumcised. Testicles in place bilaterally without masses. Normal scrotum.  Neuro: grossly intact  Motor: excellent strength throughout  Skin: clear of rashes or ecchymoses.         Outside records:    I spent 10 minutes reviewing outside records.         Assessment and Plan:   33 year old transgender female with gender dysphoria    The criteria for " genital surgery are specific to the type of surgery being requested.  Criteria for bottom surgery:    1. Persistent, well documented gender dysphoria;  2. Capacity to make a fully informed decision and to consent for treatment;  3. Age of consent (>18 years old)  4. If significant medical or mental health concerns are present, they must be well controlled.  5. 12 continuous months of hormone therapy as appropriate to the patient s gender goals (unless  the patient has a medical contraindication or is otherwise unable or unwilling to take  Hormones).  6. Two letters of support    The aim of hormone therapy prior to gonadectomy is primarily to introduce a period of reversible  estrogen or testosterone suppression, before the patient undergoes irreversible surgical intervention.    I reviewed the steps of orchiectomy. I reviewed the surgical procedure. I reviewed the risks and benefits including bleeding, infection and irreversible nature of the procedure. The patient would like orchiectomy only for now.    We reviewed that hair removal is a requirement prior to full depth vaginoplasty as the genital skin will be placed in the vaginal cavity. Lack of hair removal would lead to complications related to intravaginal hair. This is nearly impossible to remove postoperatively. She is unsure about further surgery but accepted hair removal information    She has a persistent, well documented gender dysphoria. She has capacity to make a fully informed decision and to consent for treatment. Her mental health issues are well controlled. She has been on continuous hormones for years. She has two letters of support ready for PA.     The patient meets all of these criteria. We discussed that gender affirmation surgery should be considered permanent. We discussed risks/complications of rectal injury, rectovaginal fistula, bleeding, fluid collection, infection, injury to surrounding structures, flap loss, sensory loss, wound  dehiscence, vaginal prolapse, vaginal shrinkage/stenosis, need for lifelong dilation, urinary stream abnormalities, DVT/PE and need for revision surgery.     We discussed the option for minimal depth and full depth. She would like to do orchiectomy now and work toward further surgery in the future if interested (leaning toward FD if she does it)    Ready for prior auth for orchiectomy    Plan: Will submit PA for orchiectomy    SHAYNA Corral, CNP    Physician Attestation   I, Kevin Wright MD, saw this patient and agree with the findings and plan of care as documented in the note.      Kevin Wright MD  Reconstructive Urology

## 2024-03-13 NOTE — LETTER
"3/13/2024       RE: Leopoldo Rangel  [address]  [facility] [address]     Dear Colleague,    Thank you for referring your patient, Leopoldo Rangel, to the [facility] [place] [facility]. Please see a copy of my visit note below.    Clarie is a 33 year old adult who is being cared for via a billable virtual visit.        The patient/client has been notified and verbally consented to the following statements:   This video visit will be conducted between you and your provider.  If during the course of the call the provider feels a video visit is not appropriate, you will not be charged for this service.    Provider has received verbal consent for billable virtual visit from the patient? 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  Laryngeal function studies show significant increase in trans-glottal airflow compared to age and gender matched norms, increased inferred subglottal pressure, and significantly elevated AVQI. Ms. Rangel demonstrated good learning of early therapeutic activities.      SUBJECTIVE:  Since the last appointment, Ms. Rangel reports the following:   Overall she reports that symptoms are stable  Minimal time to complete the exercises    OBJECTIVE:  Ms. Rangel presents today with the following:  Voice quality:  Roughness: WNL  Breathiness: WNL  Strain: WNL  Minimal ramirez  Pitch:  F0/Habitual/Conversational speech:  low at " "C3      PATIENT REPORTED MEASURES:  Patient Supplied Answers To SLP QOL Questionnaire       No data to display              Patient Supplied Answers To VHI Questionnaire      3/12/2024    11:07 AM   Voice Handicap Index (VHI-10)   My voice makes it difficult for people to hear me 1   People have difficulty understanding me in a noisy room 1   My voice difficulties restrict my personal and social life.  2   I feel left out of conversations because of my voice 2   My voice problem causes me to lose income 0   I feel as though I have to strain to produce voice 1   The clarity of my voice is unpredictable 3   My voice problem upsets me 3   My voice makes me feel handicapped 2   People ask, \"What's wrong with your voice?\" 0   VHI-10 15       THERAPEUTIC ACTIVITIES  Demonstrated previous exercises.  demonstrated improved technique  appropriate redirection provided  instruction provided for increased level of complexity/difficulty    Instructed in techniques to improve length of utterance with reduced effort for optimal carryover.  Instructed with scripted dialogue.  Progressed to words, phrases, and sentences while applying a variety of emotions; this was helpful.  Developed a mental checklist of factors to help trouble shoot moments of difficulty during daily speaking tasks.    Counseling and Education:  Asked many questions about the nature of her symptoms, and I answered all of these thoroughly.  I provided an AVS of today's therapeutic activities to facilitate practice.    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   Ms. Rangel demonstrated good learning today of therapeutic activities to carry over techniques in to conversation.    PLAN: I will see Ms. Rangel in 2 weeks, at which point we will continue " learning and modifying strategies to carryover.   For practice goals see AVS.     Next Clinic Appt: 3/26    TOTAL SERVICE TIME:   Call Initiated at: 1:03 PM  Call Ended at: 2p           CPT Billing Codes:   TREATMENT OF SPEECH, LANGUAGE, VOICE, COMMUNICATION, and/or AUDITORY PROCESSING DISORDER (63940)    Mayuri Barton M.M. (voice), M.A., CCC/SLP  Speech-Language Pathologist  Formerly [facility] Trained Vocologist  [facility]  [phone]  [email or web address]  Pronouns: she/her      *this report was created in part through the use of computerized dictation software, and though reviewed following completion, some typographic errors may persist.  If there is confusion regarding any of this notes contents, please contact me for clarification            Again, thank you for allowing me to participate in the care of your patient.      Sincerely,    Mayuri Barton, SLP

## 2024-03-13 NOTE — PROGRESS NOTES
"Claire is a 33 year old adult who is being cared for via a billable virtual visit.        The patient/client has been notified and verbally consented to the following statements:   This video visit will be conducted between you and your provider.  If during the course of the call the provider feels a video visit is not appropriate, you will not be charged for this service.    Provider has received verbal consent for billable virtual visit from the patient? Yes    Preferred method for receiving information: Mobangohart     Call initiated at: 1:03 PM  Platform used to conduct today's virtual appointment: AM Kyree Video  Location of provider: Residence  Location of patient: Residence    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  Laryngeal function studies show significant increase in trans-glottal airflow compared to age and gender matched norms, increased inferred subglottal pressure, and significantly elevated AVQI. Ms. Rangel demonstrated good learning of early therapeutic activities.      SUBJECTIVE:  Since the last appointment, Ms. Rangel reports the following:   Overall she reports that symptoms are stable  Minimal time to complete the exercises    OBJECTIVE:  Ms. Rangel presents today with the following:  Voice quality:  Roughness: WNL  Breathiness: WNL  Strain: WNL  Minimal ramirez  Pitch:  F0/Habitual/Conversational speech:  low at C3      PATIENT REPORTED MEASURES:  Patient Supplied Answers To SLP QOL Questionnaire       No data to display              Patient Supplied Answers To VHI Questionnaire      3/12/2024    11:07 AM   Voice Handicap Index (VHI-10)   My voice makes it difficult for people to hear me 1   People have difficulty " "understanding me in a noisy room 1   My voice difficulties restrict my personal and social life.  2   I feel left out of conversations because of my voice 2   My voice problem causes me to lose income 0   I feel as though I have to strain to produce voice 1   The clarity of my voice is unpredictable 3   My voice problem upsets me 3   My voice makes me feel handicapped 2   People ask, \"What's wrong with your voice?\" 0   VHI-10 15       THERAPEUTIC ACTIVITIES  Demonstrated previous exercises.  demonstrated improved technique  appropriate redirection provided  instruction provided for increased level of complexity/difficulty    Instructed in techniques to improve length of utterance with reduced effort for optimal carryover.  Instructed with scripted dialogue.  Progressed to words, phrases, and sentences while applying a variety of emotions; this was helpful.  Developed a mental checklist of factors to help trouble shoot moments of difficulty during daily speaking tasks.    Counseling and Education:  Asked many questions about the nature of her symptoms, and I answered all of these thoroughly.  I provided an AVS of today's therapeutic activities to facilitate practice.    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   Ms. Rangel demonstrated good learning today of therapeutic activities to carry over techniques in to conversation.    PLAN: I will see Ms. Rangel in 2 weeks, at which point we will continue learning and modifying strategies to carryover.   For practice goals see AVS.     Next Clinic Appt: 3/26    TOTAL SERVICE TIME:   Call Initiated at: 1:03 PM  Call Ended at: 2p           CPT Billing Codes:   TREATMENT OF SPEECH, LANGUAGE, VOICE, COMMUNICATION, and/or AUDITORY PROCESSING DISORDER (54488)    Mayuri " GIRISH Barton (voice), M.A., CCC/SLP  Speech-Language Pathologist  [place] Trained Vocologist  [facility]  [phone]  [email or web address]  Pronouns: she/her      *this report was created in part through the use of computerized dictation software, and though reviewed following completion, some typographic errors may persist.  If there is confusion regarding any of this notes contents, please contact me for clarification

## 2024-03-13 NOTE — PROGRESS NOTES
"    Name: Leopoldo Rangel \"Martha\"    MRN: [id number]   YOB: 1990                 Chief Complaint:   Gender Dysphoria            History of Present Illness:   Martha is a 33 year old transgender female seen in consultation for gender dysphoria    Patient transitioned starting in early 2023  Preferred pronouns are: she/her/hers  The patient has been on exogenous hormones since: March 2023.  In terms of an intimate relationship and support system the patient is here today with her wife, Cat.  In terms of fertility, the patient: has already completed sperm banking    (New)  The patient has obtained letters of support from two providers. They are adequate and ready for PA.    (Prior Surgery)  The patient has previously undergone no gender surgeries.    Long-term surgical goals for the patient include: orchiectomy now followed by possible FD vaginoplasty in the future, but she is undecided. Just wants to keep her options open for the future    The patient is here today expressing interest in orchiectomy.    The patient has not begun hair removal.         Past Medical History:   No past medical history on file.  Gender dysphoria  Migraine  ADHD  Anxiety  Dyslexia         Past Surgical History:   No past surgical history on file.  No previous surgeries         Social History:     Social History     Tobacco Use    Smoking status: Never    Smokeless tobacco: Never   Substance Use Topics    Alcohol use: Yes     Comment: occ.            Family History:     Family History   Problem Relation Age of Onset    Diabetes Mother     Mental Illness Father     Mental Illness Sister     Colon Cancer Maternal Grandmother     Arthritis Paternal Grandmother     Hypertension Paternal Grandfather     Venous thrombosis No family hx of               Allergies:   No Known Allergies         Medications:     Current Outpatient Medications   Medication Sig    estradiol (ESTRACE) 2 MG tablet Take 1 tablet (2 mg) by mouth 2 times daily " "   ketoconazole (NIZORAL) 2 % external cream     lisdexamfetamine (VYVANSE) 30 MG capsule Take 1 capsule (30 mg) by mouth daily for 30 days    [START ON 3/26/2024] lisdexamfetamine (VYVANSE) 30 MG capsule Take 1 capsule (30 mg) by mouth daily for 30 days    lisdexamfetamine (VYVANSE) 30 MG capsule Take 1 capsule (30 mg) by mouth every morning    lisdexamfetamine (VYVANSE) 30 MG capsule Take 1 capsule (30 mg) by mouth daily    lisdexamfetamine (VYVANSE) 30 MG capsule Take 1 capsule (30 mg) by mouth every morning    sertraline (ZOLOFT) 100 MG tablet Take 1.5 tablets (150 mg) by mouth daily    tadalafil (CIALIS) 2.5 MG tablet Take 1 tablet (2.5 mg) by mouth daily    estradiol (ESTRACE) 2 MG tablet Take 1 tablet (2 mg) by mouth 2 times daily for 5 days    spironolactone (ALDACTONE) 50 MG tablet Take 1 tablet (50 mg) by mouth daily (Patient not taking: Reported on 3/13/2024)     No current facility-administered medications for this visit.             Review of Systems:    ROS: ROS neg other than the symptoms noted above in the HPI.          Physical Exam:   /79   Pulse 86   Ht 1.695 m (5' 6.73\")   Wt 67.4 kg (148 lb 8 oz)   SpO2 98%   BMI 23.45 kg/m    General: age-appropriate in NAD  HEENT: Head AT/NC, EOMI, CN Grossly intact  Resp: no respiratory distress  : circumcised. Testicles in place bilaterally without masses. Normal scrotum.  Neuro: grossly intact  Motor: excellent strength throughout  Skin: clear of rashes or ecchymoses.         Outside records:    I spent 10 minutes reviewing outside records.         Assessment and Plan:   33 year old transgender female with gender dysphoria    The criteria for genital surgery are specific to the type of surgery being requested.  Criteria for bottom surgery:    1. Persistent, well documented gender dysphoria;  2. Capacity to make a fully informed decision and to consent for treatment;  3. Age of consent (>18 years old)  4. If significant medical or mental health " concerns are present, they must be well controlled.  5. 12 continuous months of hormone therapy as appropriate to the patient s gender goals (unless  the patient has a medical contraindication or is otherwise unable or unwilling to take  Hormones).  6. Two letters of support    The aim of hormone therapy prior to gonadectomy is primarily to introduce a period of reversible  estrogen or testosterone suppression, before the patient undergoes irreversible surgical intervention.    I reviewed the steps of orchiectomy. I reviewed the surgical procedure. I reviewed the risks and benefits including bleeding, infection and irreversible nature of the procedure. The patient would like orchiectomy only for now.    We reviewed that hair removal is a requirement prior to full depth vaginoplasty as the genital skin will be placed in the vaginal cavity. Lack of hair removal would lead to complications related to intravaginal hair. This is nearly impossible to remove postoperatively. She is unsure about further surgery but accepted hair removal information    She has a persistent, well documented gender dysphoria. She has capacity to make a fully informed decision and to consent for treatment. Her mental health issues are well controlled. She has been on continuous hormones for years. She has two letters of support ready for PA.     The patient meets all of these criteria. We discussed that gender affirmation surgery should be considered permanent. We discussed risks/complications of rectal injury, rectovaginal fistula, bleeding, fluid collection, infection, injury to surrounding structures, flap loss, sensory loss, wound dehiscence, vaginal prolapse, vaginal shrinkage/stenosis, need for lifelong dilation, urinary stream abnormalities, DVT/PE and need for revision surgery.     We discussed the option for minimal depth and full depth. She would like to do orchiectomy now and work toward further surgery in the future if interested  (leaning toward FD if she does it)    Ready for prior auth for orchiectomy    Plan: Will submit PA for orchiectomy    SHAYNA Corral, CNP    Physician Attestation   I, Kevin Wright MD, saw this patient and agree with the findings and plan of care as documented in the note.      Kevin Wright MD  Reconstructive Urology

## 2024-03-13 NOTE — PATIENT INSTRUCTIONS
"After Visit Summary    Patient: Martha Rangel  Date of Visit: 3/13/2024    These notes are also available in your MyChart. Please take a few moments to find them under \"Past Appointments\" in the [employer] system, as Mayuri will start to phase out e-mail communications.    \"Handouts\" that go along with today's order of activities include (below):   Frequency of practice: 2-3x/day unless marked otherwise    Order of today's appointment:  Review and practice the exercises from our February session                Mayuri Barton M.M. (voice) M.A., CCC/SLP  Speech-Language Pathologist  [facility] Certified Vocologist  [facility] Voice [facility]  [email or web address]  she/her    "

## 2024-03-13 NOTE — NURSING NOTE
"Chief Complaint   Patient presents with    Consult     Orchiectomy consult        Blood pressure 126/79, pulse 86, height 1.695 m (5' 6.73\"), weight 67.4 kg (148 lb 8 oz), SpO2 98%. Body mass index is 23.45 kg/m .    Patient Active Problem List   Diagnosis    Intractable chronic migraine without aura and without status migrainosus    Mild anxiety    Attention deficit hyperactivity disorder (ADHD), predominantly inattentive type    Skin lesion    Dyslexia    Gender dysphoria in adult       No Known Allergies    Current Outpatient Medications   Medication Sig Dispense Refill    estradiol (ESTRACE) 2 MG tablet Take 1 tablet (2 mg) by mouth 2 times daily 180 tablet 2    ketoconazole (NIZORAL) 2 % external cream       lisdexamfetamine (VYVANSE) 30 MG capsule Take 1 capsule (30 mg) by mouth daily for 30 days 30 capsule 0    [START ON 3/26/2024] lisdexamfetamine (VYVANSE) 30 MG capsule Take 1 capsule (30 mg) by mouth daily for 30 days 30 capsule 0    lisdexamfetamine (VYVANSE) 30 MG capsule Take 1 capsule (30 mg) by mouth every morning 30 capsule 0    lisdexamfetamine (VYVANSE) 30 MG capsule Take 1 capsule (30 mg) by mouth daily 30 capsule 0    lisdexamfetamine (VYVANSE) 30 MG capsule Take 1 capsule (30 mg) by mouth every morning 30 capsule 0    sertraline (ZOLOFT) 100 MG tablet Take 1.5 tablets (150 mg) by mouth daily 135 tablet 3    tadalafil (CIALIS) 2.5 MG tablet Take 1 tablet (2.5 mg) by mouth daily 90 tablet 3    estradiol (ESTRACE) 2 MG tablet Take 1 tablet (2 mg) by mouth 2 times daily for 5 days 10 tablet 0    spironolactone (ALDACTONE) 50 MG tablet Take 1 tablet (50 mg) by mouth daily (Patient not taking: Reported on 3/13/2024) 90 tablet 3       Social History     Tobacco Use    Smoking status: Never    Smokeless tobacco: Never   Substance Use Topics    Alcohol use: Yes     Comment: occ.    Drug use: Never       [place]  3/13/2024  8:45 AM     "

## 2024-03-14 ENCOUNTER — LAB (OUTPATIENT)
Dept: LAB | Facility: CLINIC | Age: 34
End: 2024-03-14
Payer: COMMERCIAL

## 2024-03-14 DIAGNOSIS — Z51.81 ENCOUNTER FOR THERAPEUTIC DRUG MONITORING: ICD-10-CM

## 2024-03-14 DIAGNOSIS — F64.0 GENDER DYSPHORIA IN ADULT: ICD-10-CM

## 2024-03-14 LAB — HGB BLD-MCNC: 13.8 G/DL (ref 11.7–17.7)

## 2024-03-14 PROCEDURE — 82670 ASSAY OF TOTAL ESTRADIOL: CPT

## 2024-03-14 PROCEDURE — 84403 ASSAY OF TOTAL TESTOSTERONE: CPT

## 2024-03-14 PROCEDURE — 80061 LIPID PANEL: CPT

## 2024-03-14 PROCEDURE — 85018 HEMOGLOBIN: CPT

## 2024-03-14 PROCEDURE — 36415 COLL VENOUS BLD VENIPUNCTURE: CPT

## 2024-03-14 PROCEDURE — 80048 BASIC METABOLIC PNL TOTAL CA: CPT

## 2024-03-15 LAB
ANION GAP SERPL CALCULATED.3IONS-SCNC: 11 MMOL/L (ref 7–15)
BUN SERPL-MCNC: 10.9 MG/DL (ref 6–20)
CALCIUM SERPL-MCNC: 9.7 MG/DL (ref 8.6–10)
CHLORIDE SERPL-SCNC: 102 MMOL/L (ref 98–107)
CHOLEST SERPL-MCNC: 172 MG/DL
CREAT SERPL-MCNC: 0.9 MG/DL (ref 0.51–1.17)
DEPRECATED HCO3 PLAS-SCNC: 27 MMOL/L (ref 22–29)
EGFRCR SERPLBLD CKD-EPI 2021: >90 ML/MIN/1.73M2
ESTRADIOL SERPL-MCNC: 283 PG/ML
FASTING STATUS PATIENT QL REPORTED: YES
GLUCOSE SERPL-MCNC: 91 MG/DL (ref 70–99)
HDLC SERPL-MCNC: 61 MG/DL
LDLC SERPL CALC-MCNC: 95 MG/DL
NONHDLC SERPL-MCNC: 111 MG/DL
POTASSIUM SERPL-SCNC: 4.7 MMOL/L (ref 3.4–5.3)
SODIUM SERPL-SCNC: 140 MMOL/L (ref 135–145)
TRIGL SERPL-MCNC: 80 MG/DL

## 2024-03-16 LAB — TESTOST SERPL-MCNC: 18 NG/DL (ref 8–950)

## 2024-03-19 ENCOUNTER — OFFICE VISIT (OUTPATIENT)
Dept: FAMILY MEDICINE | Facility: CLINIC | Age: 34
End: 2024-03-19
Payer: COMMERCIAL

## 2024-03-19 VITALS
HEIGHT: 67 IN | HEART RATE: 70 BPM | TEMPERATURE: 98.5 F | DIASTOLIC BLOOD PRESSURE: 70 MMHG | SYSTOLIC BLOOD PRESSURE: 106 MMHG | BODY MASS INDEX: 23.61 KG/M2 | OXYGEN SATURATION: 99 %

## 2024-03-19 DIAGNOSIS — Z00.00 ROUTINE GENERAL MEDICAL EXAMINATION AT A HEALTH CARE FACILITY: Primary | ICD-10-CM

## 2024-03-19 DIAGNOSIS — F41.9 ANXIETY: ICD-10-CM

## 2024-03-19 DIAGNOSIS — F90.0 ATTENTION DEFICIT HYPERACTIVITY DISORDER (ADHD), PREDOMINANTLY INATTENTIVE TYPE: ICD-10-CM

## 2024-03-19 DIAGNOSIS — F64.0 GENDER DYSPHORIA IN ADULT: ICD-10-CM

## 2024-03-19 PROCEDURE — 99395 PREV VISIT EST AGE 18-39: CPT | Performed by: FAMILY MEDICINE

## 2024-03-19 RX ORDER — SERTRALINE HYDROCHLORIDE 100 MG/1
150 TABLET, FILM COATED ORAL DAILY
Qty: 135 TABLET | Refills: 3 | Status: SHIPPED | OUTPATIENT
Start: 2024-03-19

## 2024-03-19 RX ORDER — LISDEXAMFETAMINE DIMESYLATE 30 MG/1
30 CAPSULE ORAL DAILY
Qty: 30 CAPSULE | Refills: 0 | Status: SHIPPED | OUTPATIENT
Start: 2024-04-25 | End: 2024-05-25

## 2024-03-19 RX ORDER — LISDEXAMFETAMINE DIMESYLATE 30 MG/1
30 CAPSULE ORAL DAILY
Qty: 30 CAPSULE | Refills: 0 | Status: SHIPPED | OUTPATIENT
Start: 2024-05-25 | End: 2024-06-24

## 2024-03-19 RX ORDER — ESTRADIOL 2 MG/1
2 TABLET ORAL 2 TIMES DAILY
Qty: 180 TABLET | Refills: 3 | Status: SHIPPED | OUTPATIENT
Start: 2024-03-19

## 2024-03-19 RX ORDER — LISDEXAMFETAMINE DIMESYLATE 30 MG/1
30 CAPSULE ORAL DAILY
Qty: 30 CAPSULE | Refills: 0 | Status: SHIPPED | OUTPATIENT
Start: 2024-06-26 | End: 2024-07-26

## 2024-03-19 ASSESSMENT — PATIENT HEALTH QUESTIONNAIRE - PHQ9
SUM OF ALL RESPONSES TO PHQ QUESTIONS 1-9: 5
10. IF YOU CHECKED OFF ANY PROBLEMS, HOW DIFFICULT HAVE THESE PROBLEMS MADE IT FOR YOU TO DO YOUR WORK, TAKE CARE OF THINGS AT HOME, OR GET ALONG WITH OTHER PEOPLE: SOMEWHAT DIFFICULT
SUM OF ALL RESPONSES TO PHQ QUESTIONS 1-9: 5

## 2024-03-19 NOTE — PROGRESS NOTES
Preventive Care Visit  [facility] [place]  April Vanessa MD, Family Medicine  Mar 19, 2024      Assessment & Plan   1. Gender dysphoria in adult  No dose changes today. Send me a Meta[employer] message if you want to switch to injectibles before next year.   Otherwise, next hormone visit in 1 year (labs 1 week before)  - estradiol (ESTRACE) 2 MG tablet; Take 1 tablet (2 mg) by mouth 2 times daily  Dispense: 180 tablet; Refill: 3  - Comprehensive Gender Care Referral - Internal; Future    2. Attention deficit hyperactivity disorder (ADHD), predominantly inattentive type  Symptoms well controlled with current dose. No changes. NExt visit needed in 6 months.   Send me a MetaMed message mid-June and I will reorder for another 3 months.   Clinic visit for ADHD every 6 months (due Sept 2024)  - lisdexamfetamine (VYVANSE) 30 MG capsule; Take 1 capsule (30 mg) by mouth daily for 30 days  Dispense: 30 capsule; Refill: 0  - lisdexamfetamine (VYVANSE) 30 MG capsule; Take 1 capsule (30 mg) by mouth daily for 30 days  Dispense: 30 capsule; Refill: 0  - lisdexamfetamine (VYVANSE) 30 MG capsule; Take 1 capsule (30 mg) by mouth daily for 30 days  Dispense: 30 capsule; Refill: 0    3. Routine general medical examination at a health care facility    Anxiety  Well controlled, no dose change  -     sertraline (ZOLOFT) 100 MG tablet; Take 1.5 tablets (150 mg) by mouth daily         Follow up plan  Return in about 6 months (around 9/19/2024) for ADHD visit, with PCP.        Return in about 6 months (around 9/19/2024) for ADHD visit, with PCP.    Andrea Thomas is a 33 year old, presenting for the following:  Physical (Annual wellness + HRT )        6/26/2023     3:02 PM 10/4/2023     4:17 PM 3/19/2024     8:23 AM   PHQ   PHQ-9 Total Score 8 4 5   Q9: Thoughts of better off dead/self-harm past 2 weeks Not at all Not at all Not at all           3/19/2024     8:33 AM   Additional Questions   Roomed by Neri          3/19/2024    Information    services provided? No        Health Care Directive  Patient does not have a Health Care Directive or Living Will: Discussed advance care planning with patient; information given to patient to review.    HPI      GAHT  Start 3/24/23  Meds 100mg spironolactone, 2mg BID estradiol SL  Labs 6/13/23  TT=14, estradiol = 142, K=3.9  Labs 9/21/23 TT=9, Estradiol=184, K=4.2  Stopped 10/04/2023 Spironolactone due to brain fog (improved after stopping)  Labs 3/14/24 TT=18 ,Estradiol=283, K=4.7    Interested in electralasis for facial hair removal   Considering switching to injectables, but afraid of needles. Will continue with SL route for now. Not intersted in patches (anecdotally)  No longer taking spironolactone.   No undesired medication side effects.  HRT labs done on 3/14    Works as food microbiologist. 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          No data to display                  4/27/2023   Nutrition   Three or more servings of calcium each day? Yes   Diet: regular (no restrictions)         4/27/2023   Exercise   Frequency of exercise: 1 day/week         9/27/2023   Social Factors   Worry food won't last until get money to buy more No   Food not last or not have enough money for food? No   Do you have housing?  Yes   Are you worried about losing your housing? No   Lack of transportation? No   Unable to get utilities (heat,electricity)? No         4/27/2023   Dental   Dentist two times every year? Yes          Today's PHQ-9 Score:       3/19/2024     8:23 AM   PHQ-9 SCORE   PHQ-9 Total Score MyChart 5 (Mild depression)   PHQ-9 Total Score 5         4/27/2023   Substance Use   Alcohol more than 3/day or more than 7/wk No     Social History     Tobacco Use    Smoking status: Never    Smokeless tobacco: Never   Substance Use Topics    Alcohol use: Yes     Comment: occ.    Drug use: Never              No data to display                 Reviewed and updated as needed this visit by  "Provider                      All other review of symptoms was otherwise negative except as noted in HPI       Objective    Exam  /70 (BP Location: Left arm, Patient Position: Sitting, Cuff Size: Adult Regular)   Pulse 70   Temp 98.5  F (36.9  C) (Oral)   Ht 1.689 m (5' 6.5\")   SpO2 99%   BMI 23.61 kg/m     Estimated body mass index is 23.61 kg/m  as calculated from the following:    Height as of this encounter: 1.689 m (5' 6.5\").    Weight as of 3/13/24: 67.4 kg (148 lb 8 oz).    Physical Exam  Constitutional:       General: She is not in acute distress.  HENT:      Head: Normocephalic and atraumatic.   Eyes:      Extraocular Movements: Extraocular movements intact.      Conjunctiva/sclera: Conjunctivae normal.   Cardiovascular:      Rate and Rhythm: Normal rate and regular rhythm.      Heart sounds: No murmur heard.  Pulmonary:      Effort: Pulmonary effort is normal. No respiratory distress.      Breath sounds: Normal breath sounds. No wheezing.   Musculoskeletal:      Cervical back: Neck supple.      Right lower leg: No edema.      Left lower leg: No edema.   Lymphadenopathy:      Cervical: No cervical adenopathy.   Skin:     General: Skin is warm and dry.   Neurological:      General: No focal deficit present.      Mental Status: She is alert and oriented to person, place, and time.   Psychiatric:         Mood and Affect: Mood normal.         Behavior: Behavior normal.       Lorna Justin, MS3  [employer]    Preceptor Attestation:   I was present with the medical student who participated in the service and in the documentation of this note. I have verified the history and personally performed the physical exam and medical decision making. I have verified the content of the note, which accurately reflects my assessment of the patient and the plan of care.   Supervising Physician:  April Vanessa MD.      Signed Electronically by: April Vanessa MD    Answers " submitted by the patient for this visit:  Patient Health Questionnaire (Submitted on 3/19/2024)  If you checked off any problems, how difficult have these problems made it for you to do your work, take care of things at home, or get along with other people?: Somewhat difficult  PHQ9 TOTAL SCORE: 5

## 2024-03-19 NOTE — PATIENT INSTRUCTIONS
Patient Education   Here is the plan from today's visit    1. Gender dysphoria in adult  No dose changes today. Send me a [employer] message if you want to switch to injectibles before next year.   Otherwise, next hormone visit in 1 year (labs 1 week before)  - estradiol (ESTRACE) 2 MG tablet; Take 1 tablet (2 mg) by mouth 2 times daily  Dispense: 180 tablet; Refill: 3  - Comprehensive Gender Care Referral - Internal; Future    2. Attention deficit hyperactivity disorder (ADHD), predominantly inattentive type  Send me a [employer] message mid-June and I will reorder for another 3 months.   Clinic visit for ADHD every 6 months (due Sept 2024)  - lisdexamfetamine (VYVANSE) 30 MG capsule; Take 1 capsule (30 mg) by mouth daily for 30 days  Dispense: 30 capsule; Refill: 0  - lisdexamfetamine (VYVANSE) 30 MG capsule; Take 1 capsule (30 mg) by mouth daily for 30 days  Dispense: 30 capsule; Refill: 0  - lisdexamfetamine (VYVANSE) 30 MG capsule; Take 1 capsule (30 mg) by mouth daily for 30 days  Dispense: 30 capsule; Refill: 0    3. Routine general medical examination at a health care facility      Please call or return to clinic if your symptoms don't go away.    Follow up plan  Return in about 6 months (around 9/19/2024) for ADHD visit, with PCP.    Thank you for coming to [facility] today.  Lab Testing:  **If you had lab testing today and your results are reassuring or normal they will be mailed to you or sent through [employer] within 7 days.   **If the lab tests need quick action we will call you with the results.  **If you are having labs done on a different day, please call [phone] to schedule at [facility] or [phone] for other [facility] locations. Labs do not offer walk-in appointments.  The phone number we will call with results is # [phone] (home) . If this is not the best number please call our clinic and change the number.  Medication Refills:  If you need any refills please  call your pharmacy and they will contact us.   If you need to  your refill at a new pharmacy, please contact the new pharmacy directly. The new pharmacy will help you get your medications transferred faster.   Scheduling:  If you have any concerns about today's visit or wish to schedule another appointment please call our office during normal business hours [phone] (8-5:00 M-F). If you can no longer make a scheduled visit, please cancel via Depopt or call us to cancel.   If a referral was made to an [facility] specialty provider and you do not get a call from central scheduling, please refer to directions on your visit summary or call our office during normal business hours for assistance.   If a Mammogram was ordered for you at the Breast Center call [phone] to schedule or change your appointment.  If you had an XRay/CT/Ultrasound/MRI ordered the number is [phone] to schedule or change your radiology appointment.   [facility] has limited ultrasound appointments available on Wednesdays, if you would like your ultrasound at [facility], please call [phone] to schedule.   Medical Concerns:  If you have urgent medical concerns please call [phone] at any time of the day.    April Vanessa MD         Preventive Care Advice   This is general advice given by our system to help you stay healthy. However, your care team may have specific advice just for you. Please talk to your care team about your preventive care needs.  Nutrition  Eat 5 or more servings of fruits and vegetables each day.  Try wheat bread, brown rice and whole grain pasta (instead of white bread, rice, and pasta).  Get enough calcium and vitamin D. Check the label on foods and aim for 100% of the RDA (recommended daily allowance).  Lifestyle  Exercise at least 150 minutes each week   (30 minutes a day, 5 days a week).  Do muscle strengthening activities 2 days a week. These help control your weight and  prevent disease.  No smoking.  Wear sunscreen to prevent skin cancer.  Have a dental exam and cleaning every 6 months.  Yearly exams  See your health care team every year to talk about:  Any changes in your health.  Any medicines your care team has prescribed.  Preventive care, family planning, and ways to prevent chronic diseases.  Shots (vaccines)   HPV shots (up to age 26), if you've never had them before.  Hepatitis B shots (up to age 59), if you've never had them before.  COVID-19 shot: Get this shot when it's due.  Flu shot: Get a flu shot every year.  Tetanus shot: Get a tetanus shot every 10 years.  Pneumococcal, hepatitis A, and RSV shots: Ask your care team if you need these based on your risk.  Shingles shot (for age 50 and up).  General health tests  Diabetes screening:  Starting at age 35, Get screened for diabetes at least every 3 years.  If you are younger than age 35, ask your care team if you should be screened for diabetes.  Cholesterol test: At age 39, start having a cholesterol test every 5 years, or more often if advised.  Bone density scan (DEXA): At age 50, ask your care team if you should have this scan for osteoporosis (brittle bones).  Hepatitis C: Get tested at least once in your life.  STIs (sexually transmitted infections)  Before age 24: Ask your care team if you should be screened for STIs.  After age 24: Get screened for STIs if you're at risk. You are at risk for STIs (including HIV) if:  You are sexually active with more than one person.  You don't use condoms every time.  You or a partner was diagnosed with a sexually transmitted infection.  If you are at risk for HIV, ask about PrEP medicine to prevent HIV.  Get tested for HIV at least once in your life, whether you are at risk for HIV or not.  Cancer screening tests  Cervical cancer screening: If you have a cervix, begin getting regular cervical cancer screening tests at age 21. Most people who have regular screenings with normal  results can stop after age 65. Talk about this with your provider.  Breast cancer scan (mammogram): If you've ever had breasts, begin having regular mammograms starting at age 40. This is a scan to check for breast cancer.  Colon cancer screening: It is important to start screening for colon cancer at age 45.  Have a colonoscopy test every 10 years (or more often if you're at risk) Or, ask your provider about stool tests like a FIT test every year or Cologuard test every 3 years.  To learn more about your testing options, visit: https://www.[employer]/240262.pdf.  For help making a decision, visit: https://bit.[employer]/jn75060.  Prostate cancer screening test: If you have a prostate and are age 55 to 69, ask your provider if you would benefit from a yearly prostate cancer screening test.  Lung cancer screening: If you are a current or former smoker age 50 to 80, ask your care team if ongoing lung cancer screenings are right for you.  For informational purposes only. Not to replace the advice of your health care provider. Copyright   2023 [employer]. All rights reserved. Clinically reviewed by the [facility]. [place] 610321 - REV 01/24.    Learning About Depression Screening  What is depression screening?  Depression screening is a way to see if you have depression symptoms. It may be done by a doctor or counselor. It's often part of a routine checkup. That's because your mental health is just as important as your physical health.  Depression is a mental health condition that affects how you feel, think, and act. You may:  Have less energy.  Lose interest in your daily activities.  Feel sad and grouchy for a long time.  Depression is very common. It affects people of all ages.  Many things can lead to depression. Some people become depressed after they have a stroke or find out they have a major illness like cancer or heart disease. The death of a loved one or a breakup may lead  "to depression. It can run in families. Most experts believe that a combination of inherited genes and stressful life events can cause it.  What happens during screening?  You may be asked to fill out a form about your depression symptoms. You and the doctor will discuss your answers. The doctor may ask you more questions to learn more about how you think, act, and feel.  What happens after screening?  If you have symptoms of depression, your doctor will talk to you about your options.  Doctors usually treat depression with medicines or counseling. Often, combining the two works best. Many people don't get help because they think that they'll get over the depression on their own. But people with depression may not get better unless they get treatment.  The cause of depression is not well understood. There may be many factors involved. But if you have depression, it's not your fault.  A serious symptom of depression is thinking about death or suicide. If you or someone you care about talks about this or about feeling hopeless, get help right away.  It's important to know that depression can be treated. Medicine, counseling, and self-care may help.  Where can you learn more?  Go to https://www.[employer].net/patiented  Enter T185 in the search box to learn more about \"Learning About Depression Screening.\"  Current as of: June 24, 2023               Content Version: 14.0    6211-5772 [employer].   Care instructions adapted under license by your healthcare professional. If you have questions about a medical condition or this instruction, always ask your healthcare professional. [employer] disclaims any warranty or liability for your use of this information.      "

## 2024-03-26 ENCOUNTER — OFFICE VISIT (OUTPATIENT)
Dept: OTOLARYNGOLOGY | Facility: CLINIC | Age: 34
End: 2024-03-26
Payer: COMMERCIAL

## 2024-03-26 DIAGNOSIS — F64.0 GENDER DYSPHORIA IN ADULT: ICD-10-CM

## 2024-03-26 DIAGNOSIS — R49.0 DYSPHONIA: Primary | ICD-10-CM

## 2024-03-26 DIAGNOSIS — R49.9 VOICE AND RESONANCE DISORDER: ICD-10-CM

## 2024-03-26 PROCEDURE — 92507 TX SP LANG VOICE COMM INDIV: CPT | Mod: GN | Performed by: SPEECH-LANGUAGE PATHOLOGIST

## 2024-03-26 NOTE — LETTER
"3/26/2024       RE: Leopoldo Rangel  [address]  [place] [address]     Dear Colleague,    Thank you for referring your patient, Leopoldo Rangel, to the [facility] [place] [facility]. Please see a copy of my visit note below.      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  Laryngeal function studies show significant increase in trans-glottal airflow compared to age and gender matched norms, increased inferred subglottal pressure, and significantly elevated AVQI. Ms. Rangel demonstrated good learning of early therapeutic activities.      SUBJECTIVE:  Since her last session, Ms. Rangel reports the following:   Overall she reports that symptoms are improving  Little more forward - using the voice more.      OBJECTIVE:  Ms. Rangel presents today with the following:  Voice quality:  Roughness: WNL  Breathiness: WNL  Strain: WNL  Minimal ramirez  Pitch:  F0/Habitual/Conversational speech:  low at C3      PATIENT REPORTED MEASURES:  Patient Supplied Answers To SLP QOL Questionnaire       No data to display              Patient Supplied Answers To VHI Questionnaire      3/26/2024    10:29 AM   Voice Handicap Index (VHI-10)   My voice makes it difficult for people to hear me 1   People have difficulty understanding me in a noisy room 1   My voice difficulties restrict my personal and social life.  2   I feel left out of conversations because of my voice 1   My voice problem causes me to lose income 1   I feel as though I have to strain to produce voice 2   The clarity of my voice is unpredictable 3   My voice problem upsets me 3   My voice makes me feel " "handicapped 2   People ask, \"What's wrong with your voice?\" 0   VHI-10 16       THERAPEUTIC ACTIVITIES  Demonstrated previous exercises.  demonstrated improved technique  appropriate redirection provided  instruction provided for increased level of complexity/difficulty  Modified strategies to achieve and maintain a more forward, resonant focus at the word, phrase, and sentence level.    Instructed in techniques to improve length of utterance with reduced effort for optimal carryover.  Instructed with dialogue  Instructed with a variety of emotions, this was very helpful.  Progressed to application with paragraphs of increasing length today; this was helpful.    Developed a mental checklist of factors to help trouble shoot moments of difficulty during daily speaking tasks.    Counseling and Education:  Asked many questions about the nature of her symptoms, and I answered all of these thoroughly.  A revised regimen for home practice was instructed.  I provided an AVS of today's therapeutic activities to facilitate practice.    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  Ms. Rangel demonstrated good learning of therapeutic activities to achieve, optimize, and maintain her target voice quality.  She will continue to practice on a daily basis.     PLAN: I will see Ms. Rangel in 3 weeks, at which point we will continue therapy.   For practice goals see AVS.     Next Clinic Appt: 4/9    TOTAL SERVICE TIME: 60 minutes  TREATMENT (10786): 60 minutes  NO CHARGE FACILITY FEE (24824)      Mayuri Barton M.M. (voice), M.A., CCC/SLP  Speech-Language Pathologist  Certificate of Vocology  [facility]  [phone]  [email or web address]  Pronouns: she/her      "

## 2024-03-26 NOTE — PROGRESS NOTES
"  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  Laryngeal function studies show significant increase in trans-glottal airflow compared to age and gender matched norms, increased inferred subglottal pressure, and significantly elevated AVQI. Ms. Rangel demonstrated good learning of early therapeutic activities.      SUBJECTIVE:  Since her last session, Ms. Rangel reports the following:   Overall she reports that symptoms are improving  Little more forward - using the voice more.      OBJECTIVE:  Ms. Rangel presents today with the following:  Voice quality:  Roughness: WNL  Breathiness: WNL  Strain: WNL  Minimal ramirez  Pitch:  F0/Habitual/Conversational speech:  low at C3      PATIENT REPORTED MEASURES:  Patient Supplied Answers To SLP QOL Questionnaire       No data to display              Patient Supplied Answers To VHI Questionnaire      3/26/2024    10:29 AM   Voice Handicap Index (VHI-10)   My voice makes it difficult for people to hear me 1   People have difficulty understanding me in a noisy room 1   My voice difficulties restrict my personal and social life.  2   I feel left out of conversations because of my voice 1   My voice problem causes me to lose income 1   I feel as though I have to strain to produce voice 2   The clarity of my voice is unpredictable 3   My voice problem upsets me 3   My voice makes me feel handicapped 2   People ask, \"What's wrong with your voice?\" 0   VHI-10 16       THERAPEUTIC ACTIVITIES  Demonstrated previous exercises.  demonstrated improved technique  appropriate redirection provided  instruction provided for increased level of complexity/difficulty  Modified strategies to achieve and " maintain a more forward, resonant focus at the word, phrase, and sentence level.    Instructed in techniques to improve length of utterance with reduced effort for optimal carryover.  Instructed with dialogue  Instructed with a variety of emotions, this was very helpful.  Progressed to application with paragraphs of increasing length today; this was helpful.    Developed a mental checklist of factors to help trouble shoot moments of difficulty during daily speaking tasks.    Counseling and Education:  Asked many questions about the nature of her symptoms, and I answered all of these thoroughly.  A revised regimen for home practice was instructed.  I provided an AVS of today's therapeutic activities to facilitate practice.    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  Ms. Rangel demonstrated good learning of therapeutic activities to achieve, optimize, and maintain her target voice quality.  She will continue to practice on a daily basis.     PLAN: I will see Ms. Rangel in 3 weeks, at which point we will continue therapy.   For practice goals see AVS.     Next Clinic Appt: 4/9    TOTAL SERVICE TIME: 60 minutes  TREATMENT (24187): 60 minutes  NO CHARGE FACILITY FEE (62151)      Mayuri Barton M.M. (voice) MBÁRBARA., CCC/SLP  Speech-Language Pathologist  Certificate of Vocology  [facility]  [phone]  [email or web address]  Pronouns: she/her

## 2024-03-26 NOTE — PATIENT INSTRUCTIONS
I KNOW AN OLD LADY    I know an old lady who swallowed a fly.  I don't know why she swallowed a fly.    I know an old lady who swallowed a spider   that wriggled and wiggled and tickled inside her.  She swallowed the spider to catch the fly.  I don't know why she swallowed the fly.    I know an old lady who swallowed a bird.  How absurd to swallow a bird!  She swallowed the bird to catch the spider   that wriggled and wiggled and tickled inside her.  She swallowed the spider to catch the fly.  I don't know why she swallowed the fly.    I know an old lady who swallowed a cat.  Imagine that, swallowing a cat!  She swallowed the cat to catch the bird.  How absurd to swallow a bird!  She swallowed the bird to catch the spider   that wriggled and wiggled and tickled inside her.  She swallowed the spider to catch the fly.  I don't know why she swallowed the fly.  Perhaps she'll die.    I know an old lady who swallowed a dog.  What a hog, to swallow a dog!  She swallowed the dog to catch the cat.  Imagine that, swallowing a cat!  She swallowed the cat to catch the bird.  How absurd to swallow a bird!  She swallowed the bird to catch the spider   that wriggled and wiggled and tickled inside her.  She swallowed the spider to catch the fly.  I don't know why she swallowed the fly.  Perhaps she'll die.    I know an old lady who swallowed a cow.  I don't know how she swallowed a cow.  She swallowed the cow to catch the dog.  What a hog, to swallow a dog!  She swallowed the dog to catch the cat.  Imagine that, swallowing a cat!  She swallowed the cat to catch the bird.  How absurd to swallow a bird!  She swallowed the bird to catch the spider   that wriggled and wiggled and tickled inside her.  She swallowed the spider to catch the fly.  I don't know why she swallowed the fly.  Perhaps she'll die.    I know an old lady who swallowed a horse.  Poor old lady, she  of course.'      [employer]

## 2024-03-27 ENCOUNTER — TELEPHONE (OUTPATIENT)
Dept: OTOLARYNGOLOGY | Facility: CLINIC | Age: 34
End: 2024-03-27
Payer: COMMERCIAL

## 2024-04-09 ENCOUNTER — OFFICE VISIT (OUTPATIENT)
Dept: OTOLARYNGOLOGY | Facility: CLINIC | Age: 34
End: 2024-04-09
Payer: COMMERCIAL

## 2024-04-09 DIAGNOSIS — R49.0 DYSPHONIA: Primary | ICD-10-CM

## 2024-04-09 DIAGNOSIS — F64.0 GENDER DYSPHORIA IN ADULT: ICD-10-CM

## 2024-04-09 DIAGNOSIS — R49.9 VOICE AND RESONANCE DISORDER: ICD-10-CM

## 2024-04-09 PROCEDURE — 92507 TX SP LANG VOICE COMM INDIV: CPT | Mod: GN | Performed by: SPEECH-LANGUAGE PATHOLOGIST

## 2024-04-09 NOTE — LETTER
"4/9/2024       RE: Leopoldo Rangel  [address]  [facility] [address]     Dear Colleague,    Thank you for referring your patient, Leopoldo Rangel, to the [facility] VOICE [facility]INIC [place] at [facility]. Please see a copy of my visit note below.      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  Laryngeal function studies show significant increase in trans-glottal airflow compared to age and gender matched norms, increased inferred subglottal pressure, and significantly elevated AVQI. Ms. Rangel demonstrated good learning of early therapeutic activities.       SUBJECTIVE:  Since her last session, Ms. Rangel reports the following:   Overall she reports that symptoms are stable    OBJECTIVE:  Ms. Rangel presents today with the following:  Voice quality:  Roughness: WNL  Breathiness: WNL  Strain: WNL  Minimal ramirez  Pitch:  F0/Habitual/Conversational speech:  low at C3      PATIENT REPORTED MEASURES:  Patient Supplied Answers To SLP QOL Questionnaire       No data to display              Patient Supplied Answers To VHI Questionnaire      4/8/2024     3:06 PM   Voice Handicap Index (VHI-10)   My voice makes it difficult for people to hear me 1   People have difficulty understanding me in a noisy room 1   My voice difficulties restrict my personal and social life.  2   I feel left out of conversations because of my voice 2   My voice problem causes me to lose income 0   I feel as though I have to strain to produce voice 2   The clarity of my voice is unpredictable 3   My voice problem upsets me 2   My voice makes me feel handicapped 1   People ask, \"What's wrong with your " "voice?\" 0   VHI-10 14       THERAPEUTIC ACTIVITIES  Demonstrated previous exercises.  demonstrated improved technique  appropriate redirection provided  instruction provided for increased level of complexity/difficulty    Semi-Occluded Vocal Tract (SOVT) exercises instructed to reduce laryngeal tension, promote vocal fold pliability, and coordinate respiration and phonation  Sustained /z/ was found to be most facilitating   Sustained phonation, and voice vs. voiceless productions used to promote easy voicing and raise awareness of laryngeal tension  Ascending and descending glides utilized to promote vocal fold pliability  \"Messa di voce\", gradual crescendo and decrescendo to vary medial compression was also utilized to promote vocal fold pliability.  Instructed on the benefits of using these exercises for improved coordination of breath flow with phonation and tissue mobilization.  Instructed on the importance of using these exercises as a warm-up / cool down,  and to re-calibrate the voice throughout the day.    Resonant Voice Therapy (RVT) exercises to promote forward locus of resonance and optimized pattern of laryngeal adduction  Speech material that elicits a high, forward tongue position (/n/) was most facilitating  Easy descending glide on /m/ utilized in conjunction with relaxed jaw, tongue, and lightly closed lips to facilitate forward resonant sound  Use of the carrier phrase \"mmhmm\" instructed to promote generalization to everyday speech  Syllable level using /m/ in alternation with cardinal vowels on sustained pitches and speech inflection  Word level exercises featuring nasal continuant loaded stimuli  Phrase level exercises featuring nasal continuants in more complex phonemic contexts were employed  Instructed with and interval of a descending 5th, as well as an arpeggio pattern was facilitating, prior to progressing to comfortable speech using optimal breath flow.  Able to recognize improvement in " quality and comfort    Exercises in techniques for improved airflow during phonation  Speech material with /ju/ glides and aspirate onsets was facilitating at the word level.  Progressed to easy onset/ flow, and blending phrases  Instructed with a descending 5th, as well as an arpeggio pattern; this was helpful.  [place] techniques to reduce glottal ramirez and improve breath flow; negative practice improved awareness today.    Instructed in techniques to improve length of utterance with reduced effort for optimal carryover.  Instructed with semi-scripted conversation and reading aloud.   Developed a mental checklist of factors to help trouble shoot moments of difficulty during daily speaking tasks.    Counseling and Education:  Asked many questions about the nature of her symptoms, and I answered all of these thoroughly.  A revised regimen for home practice was instructed.  I provided an AVS of today's therapeutic activities to facilitate practice.    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 Ms. Rangel demonstrated good learning of therapeutic activities to provide an optimal daily warm up and therapeutic activities to carry over.     PLAN: I will see Ms. Rangel in 2 weeks, at which point we will continue therapy.   For practice goals see AVS.     Next Clinic Appt: 9/11    TOTAL SERVICE TIME: 60 minutes  TREATMENT (99121): 60 minutes  NO CHARGE FACILITY FEE (66137)      Mayuri Barton M.M. (voice) MBÁRBARA., CCC/SLP  Speech-Language Pathologist  Certificate of Vocology  [facility] Voice [facility]  [phone]  [email or web address]  Pronouns: she/her      Again, thank you for allowing me to participate in the care of your patient.      Sincerely,    Mayuri Barton, SLP

## 2024-04-09 NOTE — PATIENT INSTRUCTIONS
"After Visit Summary    Patient: Martha Rangel  Date of Visit: 4/9/2024    These notes are also available in your MyChart. Please take a few moments to find them under \"Past Appointments\" in the [employer] system, as Mayuri will start to phase out e-mail communications.    \"Handouts\" that go along with today's order of activities include (below):   Frequency of practice: 2-3x/day unless marked otherwise    Order of today's appointment:  3 breaths with    Z  Sounds    WHY:  Though this exercise seems (and feels) silly they are helpful for a number of reasons.  First, they make you use your air generously and consistently, helping you to coordinate your breath and your voice.  Second, they constrict the vocal tract.  This constriction (or semi-occlusion in scientific terms) creates back pressure in your throat which has been shown to help the vocal folds vibrate more easily and reduce how hard some of the other muscles are squeezing.    HOW:    Make a  zzz  sound as though you are saying the end of the word  buzz .  You should be able to feel your tongue buzzing lightly  just behind your teeth at the roof of your mouth while you make the sound.  Feel how open and relaxed your throat feels when you practice this sound. If air stops, it gets tight, or it gets gravelly don't sweat it.  Just stop, breath, relax, and start again.  Once you've done 1-2 minutes with the exercise.    Practice 5 times a day for no more than 1-2 minutes, and whenever you feel fatigued.    What sounds to make:      Sighs - glide from high to low like you are sitting down in a comfortable chair after a long day of work      [place] - Start on a medium pitch and glide up just a bit and back down      Single pitches - use any comfortable pitch and sustain the note for as long as it feels free and easy, and your lips or tongue are bubbling.      Last practice the  ZZZ  sound followed by vowels  Za   Zi   Zoo   Zo   Ze .              My mother makes much " money.  Ese made lemon jam.  Many mice munch on melons.  Sunitha meets me at the market.  Monogamy is matrimonious   Many moon rocks are mined [place].  Mudpies are made in mud.    Phrases for Blending  Fall_over_the_chair                      (k) Go_(w)into the store  Leave_on_the_lights                     The(e)_(y)only one  See_it_over_there                         The(e)_(y)other day  (n)Do_it_now       Not_even her mom  Put(d)_on_your_shoes          Not_any more  (n)Down_under_the_stairs   Cold_as ice  Not_old_enough      the ice is cold  Look_at_the_sky      he's (z)ill with the flu  The_end_of_the_story    yes_and no  High_up_on_the_shelf      one at a time       Krystina jacobs loved the water.  Many members of her family had been in the [employer].  Her uncle was a fisherman.  He remembered many moments netting tuna in the pouring rain.  When Krystina was a little girl, she dangled slimy worms to catch sunfish.  Nothing was more fun than watching the sunfish nibble the worms.  When she was [age], Krystina learned to sail.  She named her boat Nancy.  Sometimes, her best friend, Tona Patel, went with her.  Their mothers never minded their sailing.  When the dinner cervantes rang, Tona and Krystina always came home.     - Blend   - watch for r and keep it forward   - keep resonance forward at the ends of phrases    2-3x/day -Read and speak to yourself out loud.    - start slightly higher and transition to a little lower in pitch   - keep the breathiness   - blend   - sound slightly excited for reasons unexplained   - be mindful of things that are less exciting, like work.       Mayuri Barton M.M. (voice), M.A., CCC/SLP  Speech-Language Pathologist  [facility] Certified Vocologist  [facility]  [email or web address]  she/her

## 2024-04-09 NOTE — PROGRESS NOTES
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  Laryngeal function studies show significant increase in trans-glottal airflow compared to age and gender matched norms, increased inferred subglottal pressure, and significantly elevated AVQI. Ms. Rangel demonstrated good learning of early therapeutic activities.       SUBJECTIVE:  Since her last session, Ms. Rangel reports the following:   Overall she reports that symptoms are stable    OBJECTIVE:  Ms. Rangel presents today with the following:  Voice quality:  Roughness: WNL  Breathiness: WNL  Strain: WNL  Minimal ramirez  Pitch:  F0/Habitual/Conversational speech:  low at C3      PATIENT REPORTED MEASURES:  Patient Supplied Answers To SLP QOL Questionnaire       No data to display              Patient Supplied Answers To VHI Questionnaire      4/8/2024     3:06 PM   Voice Handicap Index (VHI-10)   My voice makes it difficult for people to hear me 1   People have difficulty understanding me in a noisy room 1   My voice difficulties restrict my personal and social life.  2   I feel left out of conversations because of my voice 2   My voice problem causes me to lose income 0   I feel as though I have to strain to produce voice 2   The clarity of my voice is unpredictable 3   My voice problem upsets me 2   My voice makes me feel handicapped 1   People ask, \"What's wrong with your voice?\" 0   VHI-10 14       THERAPEUTIC ACTIVITIES  Demonstrated previous exercises.  demonstrated improved technique  appropriate redirection provided  instruction provided for increased level of complexity/difficulty    Semi-Occluded Vocal Tract (SOVT) exercises instructed to reduce laryngeal tension, " "promote vocal fold pliability, and coordinate respiration and phonation  Sustained /z/ was found to be most facilitating   Sustained phonation, and voice vs. voiceless productions used to promote easy voicing and raise awareness of laryngeal tension  Ascending and descending glides utilized to promote vocal fold pliability  \"Messa di voce\", gradual crescendo and decrescendo to vary medial compression was also utilized to promote vocal fold pliability.  Instructed on the benefits of using these exercises for improved coordination of breath flow with phonation and tissue mobilization.  Instructed on the importance of using these exercises as a warm-up / cool down,  and to re-calibrate the voice throughout the day.    Resonant Voice Therapy (RVT) exercises to promote forward locus of resonance and optimized pattern of laryngeal adduction  Speech material that elicits a high, forward tongue position (/n/) was most facilitating  Easy descending glide on /m/ utilized in conjunction with relaxed jaw, tongue, and lightly closed lips to facilitate forward resonant sound  Use of the carrier phrase \"mmhmm\" instructed to promote generalization to everyday speech  Syllable level using /m/ in alternation with cardinal vowels on sustained pitches and speech inflection  Word level exercises featuring nasal continuant loaded stimuli  Phrase level exercises featuring nasal continuants in more complex phonemic contexts were employed  Instructed with and interval of a descending 5th, as well as an arpeggio pattern was facilitating, prior to progressing to comfortable speech using optimal breath flow.  Able to recognize improvement in quality and comfort    Exercises in techniques for improved airflow during phonation  Speech material with /ju/ glides and aspirate onsets was facilitating at the word level.  Progressed to easy onset/ flow, and blending phrases  Instructed with a descending 5th, as well as an arpeggio pattern; this was " helpful.  [place] techniques to reduce glottal ramirez and improve breath flow; negative practice improved awareness today.    Instructed in techniques to improve length of utterance with reduced effort for optimal carryover.  Instructed with semi-scripted conversation and reading aloud.   Developed a mental checklist of factors to help trouble shoot moments of difficulty during daily speaking tasks.    Counseling and Education:  Asked many questions about the nature of her symptoms, and I answered all of these thoroughly.  A revised regimen for home practice was instructed.  I provided an AVS of today's therapeutic activities to facilitate practice.    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 Ms. Rangel demonstrated good learning of therapeutic activities to provide an optimal daily warm up and therapeutic activities to carry over.     PLAN: I will see Ms. Rangel in 2 weeks, at which point we will continue therapy.   For practice goals see AVS.     Next Clinic Appt: 9/11    TOTAL SERVICE TIME: 60 minutes  TREATMENT (32278): 60 minutes  NO CHARGE FACILITY FEE (84031)      Mayuri Barton M.M. (voice) M.A., CCC/SLP  Speech-Language Pathologist  Certificate of Vocology  [facility] Voice [facility]  [phone]  [email or web address]  Pronouns: she/her

## 2024-04-15 ENCOUNTER — TELEPHONE (OUTPATIENT)
Dept: UROLOGY | Facility: CLINIC | Age: 34
End: 2024-04-15
Payer: COMMERCIAL

## 2024-04-15 NOTE — TELEPHONE ENCOUNTER
Spoke with the patient and was able to confirm all scheduled information.     Patient is schedule for surgery with: Dr. Wright    Surgery Date: 6/3     [facility]    H&P: to be completed by Primary Care team - patient instructed to schedule per patient, this will be scheduled with SERVANDO Tobias     Post-op:  6/20, virtual visit  with Nova    Patient will receive a phone call from pre-admission nurses 1-2 days prior to surgery with arrival time and NPO instructions.    Patient aware times are subject to change up until day before surgery.     Patient questions/concerns: N/A     Surgery packet was sent via US mail on 4/15 with berta Card on 4/15/2024 at 10:57 AM

## 2024-05-09 ENCOUNTER — TELEPHONE (OUTPATIENT)
Dept: PLASTIC SURGERY | Facility: CLINIC | Age: 34
End: 2024-05-09
Payer: COMMERCIAL

## 2024-05-09 NOTE — TELEPHONE ENCOUNTER
Pre and Post Op Patient Education                                       Diagnosis: gender dysphoria  Teaching pre and post op for: bilateral orchiectomy  Person involved in teaching: patient    Patient demonstrates an understanding of the following:  - Date of surgery:  6/3/24 - Monday  - Surgery time: 8:25 am (pt understands that this time could change)  - [facility] - [address] [place] [id number]     - Pre-operative history physical: FV 5/17/24      - Post-op follow-up:   6/20/24 at 8:45 am (virtual) with Nova Sandoval NP      Patient verbalizes an understanding of the following:  - The need for a responsible adult  and someone to stay with them for the first 24 hours post-operatively: Yes  - NPO per anesthesia guidelines: Yes  - Pre-op showering x2 with Hibiclens/chlorhexidine soap: Yes    Discussed   - Pain management after surgery  - Infection prevention and hand hygiene  - Surgical procedure site care taught  - Signs and symptoms of infection  - Wound care and will be taught at the time of discharge  - Information about how to contact the hospital, nurse, and clinic if needed  - Advised pt to schedule an appointment with hormone prescriber 1-3 months after surgery for hormone level check.       Surgical instructions given to patient via phone.    Total time with patient: 20 minutes    Sebastian Hearn RN

## 2024-05-17 ENCOUNTER — OFFICE VISIT (OUTPATIENT)
Dept: FAMILY MEDICINE | Facility: CLINIC | Age: 34
End: 2024-05-17
Payer: COMMERCIAL

## 2024-05-17 VITALS
BODY MASS INDEX: 24.69 KG/M2 | TEMPERATURE: 98.2 F | DIASTOLIC BLOOD PRESSURE: 84 MMHG | WEIGHT: 157.3 LBS | OXYGEN SATURATION: 96 % | HEART RATE: 80 BPM | HEIGHT: 67 IN | SYSTOLIC BLOOD PRESSURE: 117 MMHG | RESPIRATION RATE: 16 BRPM

## 2024-05-17 DIAGNOSIS — Z01.818 PREOP GENERAL PHYSICAL EXAM: Primary | ICD-10-CM

## 2024-05-17 DIAGNOSIS — F64.0 GENDER DYSPHORIA IN ADULT: ICD-10-CM

## 2024-05-17 PROCEDURE — 99213 OFFICE O/P EST LOW 20 MIN: CPT | Performed by: FAMILY MEDICINE

## 2024-05-17 ASSESSMENT — ENCOUNTER SYMPTOMS
DIZZINESS: 0
COUGH: 0
ABDOMINAL PAIN: 0
CONSTIPATION: 0
VOMITING: 0
SHORTNESS OF BREATH: 0
HEADACHES: 0
FEVER: 0
SORE THROAT: 0
RHINORRHEA: 0
DIARRHEA: 0
BRUISES/BLEEDS EASILY: 0
DYSURIA: 0
CHILLS: 0
NAUSEA: 0

## 2024-05-17 NOTE — PATIENT INSTRUCTIONS
How to Take Your Medication Before Surgery  Preoperative Medication Instructions   Stop Cialis 3 days before surgery  Vyvanse - do not take day of surgery  Take all other meds as usual       Patient Education   Preparing for Your Surgery  Getting started  A nurse will call you to review your health history and instructions. They will give you an arrival time based on your scheduled surgery time. Please be ready to share:  Your doctor's clinic name and phone number  Your medical, surgical, and anesthesia history  A list of allergies and sensitivities  A list of medicines, including herbal treatments and over-the-counter drugs  Whether the patient has a legal guardian (ask how to send us the papers in advance)  Please tell us if you're pregnant--or if there's any chance you might be pregnant. Some surgeries may injure a fetus (unborn baby), so they require a pregnancy test. Surgeries that are safe for a fetus don't always need a test, and you can choose whether to have one.   If you have a child who's having surgery, please ask for a copy of Preparing for Your Child's Surgery.    Preparing for surgery  Within 10 to 30 days of surgery: Have a pre-op exam (sometimes called an H&P, or History and Physical). This can be done at a clinic or pre-operative center.  If you're having a , you may not need this exam. Talk to your care team.  At your pre-op exam, talk to your care team about all medicines you take. If you need to stop any medicines before surgery, ask when to start taking them again.  We do this for your safety. Many medicines can make you bleed too much during surgery. Some change how well surgery (anesthesia) drugs work.  Call your insurance company to let them know you're having surgery. (If you don't have insurance, call [phone].)  Call your clinic if there's any change in your health. This includes signs of a cold or flu (sore throat, runny nose, cough, rash, fever). It also includes a scrape  or scratch near the surgery site.  If you have questions on the day of surgery, call your hospital or surgery center.  Eating and drinking guidelines  For your safety: Unless your surgeon tells you otherwise, follow the guidelines below.  Eat and drink as usual until 8 hours before you arrive for surgery. After that, no food or milk.  Drink clear liquids until 2 hours before you arrive. These are liquids you can see through, like water, Gatorade, and Propel Water. They also include plain black coffee and tea (no cream or milk), candy, and breath mints. You can spit out gum when you arrive.  If you drink alcohol: Stop drinking it the night before surgery.  If your care team tells you to take medicine on the morning of surgery, it's okay to take it with a sip of water.  Preventing infection  Shower or bathe the night before and morning of your surgery. Follow the instructions your clinic gave you. (If no instructions, use regular soap.)  Don't shave or clip hair near your surgery site. We'll remove the hair if needed.  Don't smoke or vape the morning of surgery. You may chew nicotine gum up to 2 hours before surgery. A nicotine patch is okay.  Note: Some surgeries require you to completely quit smoking and nicotine. Check with your surgeon.  Your care team will make every effort to keep you safe from infection. We will:  Clean our hands often with soap and water (or an alcohol-based hand rub).  Clean the skin at your surgery site with a special soap that kills germs.  Give you a special gown to keep you warm. (Cold raises the risk of infection.)  Wear special hair covers, masks, gowns and gloves during surgery.  Give antibiotic medicine, if prescribed. Not all surgeries need antibiotics.  What to bring on the day of surgery  Photo ID and insurance card  Copy of your health care directive, if you have one  Glasses and hearing aids (bring cases)  You can't wear contacts during surgery  Inhaler and eye drops, if you use  them (tell us about these when you arrive)  CPAP machine or breathing device, if you use them  A few personal items, if spending the night  If you have . . .  A pacemaker, ICD (cardiac defibrillator) or other implant: Bring the ID card.  An implanted stimulator: Bring the remote control.  A legal guardian: Bring a copy of the certified (court-stamped) guardianship papers.  Please remove any jewelry, including body piercings. Leave jewelry and other valuables at home.  If you're going home the day of surgery  You must have a responsible adult drive you home. They should stay with you overnight as well.  If you don't have someone to stay with you, and you aren't safe to go home alone, we may keep you overnight. Insurance often won't pay for this.  After surgery  If it's hard to control your pain or you need more pain medicine, please call your surgeon's office.  Questions?   If you have any questions for your care team, list them here: _________________________________________________________________________________________________________________________________________________________________________ ____________________________________ ____________________________________ ____________________________________  For informational purposes only. Not to replace the advice of your health care provider. Copyright   2003, 2019 [facility]. All rights reserved. Clinically reviewed by Yumiko Fong MD. [facility] [id number] - REV 12/22.

## 2024-05-17 NOTE — LETTER
[facility]  05/17/24  Patient: Leopoldo Rangel  YOB: 1990  Medical Record Number: [id number]                                                                                  Non-Opioid Controlled Substance Agreement    This is an agreement between you and your provider regarding safe and appropriate use of controlled substances prescribed by your care team. Controlled substances are?medicines that can cause physical and mental dependence (abuse).     There are strict laws about having and using these medicines. We here at [facility] are  committed to working with you in your efforts to get better. To support you in this work, we'll help you schedule regular office appointments for medicine refills. If we must cancel or change your appointment for any reason, we'll make sure you have enough medicine to last until your next appointment.     As a Provider, I will:   Listen carefully to your concerns while treating you with respect.   Recommend a treatment plan that I believe is in your best interest and may involve therapies other than medicine.    Talk with you often about the possible benefits and the risk of harm of any medicine that we prescribe for you.  Assess the safety of this medicine and check how well it works.    Provide a plan on how to taper (discontinue or go off) using this medicine if the decision is made to stop its use.      ::  As a Patient, I understand controlled substances:     Are prescribed by my care provider to help me function or work and manage my condition(s).?  Are strong medicines and can cause serious side effects.     Need to be taken exactly as prescribed.?Combining controlled substances with certain medicines or chemicals (such as illegal drugs, alcohol, sedatives, sleeping pills, and benzodiazepines) can be dangerous or even fatal.? If I stop taking my medicines suddenly, I may have severe withdrawal symptoms.     The risks, benefits, and  side effects of these medicine(s) were explained to me. I agree that:    I will take part in other treatments as advised by my care team. This may be psychiatry or counseling, physical therapy, behavioral therapy, group treatment or a referral to specialist.    I will keep all my appointments and understand this is part of the monitoring of controlled substances.?My care team may require an office visit for EVERY controlled substance refill. If I miss appointments or don t follow instructions, my care team may stop my medicine    I will take my medicines as prescribed. I will not change the dose or schedule unless my care team tells me to. There will be no refills if I run out early.      I may be asked to come to the clinic and complete a urine drug test or complete a pill count. If I don t give a urine sample or participate in a pill count, the care team may stop my medicine.    I will only receive controlled substance prescriptions from this clinic. If I am treated by another provider, I will tell them that I am taking controlled substances and that I have a treatment agreement with this provider. I will inform my [facility] care team within one business day if I am given a prescription for any controlled substance by another healthcare provider. My [facility] care team can contact other providers and pharmacists about my use of any medicines.    It is up to me to make sure that I don't run out of my medicines on weekends or holidays.?If my care team is willing to refill my prescription without a visit, I must request refills only during office hours. Refills may take up to 3 business days to process. I will use one pharmacy to fill all my controlled substance prescriptions. I will notify the clinic about any changes to my insurance or medicine availability.    I am responsible for my prescriptions. If the medicine/prescription is lost, stolen or destroyed, it will not be replaced.?I also agree not  to share controlled substance medicines with anyone.     I am aware I should not use any illegal or recreational drugs. I agree not to drink alcohol unless my care team says I can.     If I enroll in the [place] Medical Cannabis program, I will tell my care team before my next refill.    I will tell my care team right away if I become pregnant, have a new medical problem treated outside of my regular clinic, or have a change in my medicines.     I understand that this medicine can affect my thinking, judgment and reaction time.? Alcohol and drugs affect the brain and body, which can affect the safety of my driving. Being under the influence of alcohol or drugs can affect my decision-making, behaviors, personal safety and the safety of others. Driving while impaired (DWI) can occur if a person is driving, operating or in physical control of a car, motorcycle, boat, snowmobile, ATV, motorbike, off-road vehicle or any other motor vehicle ([place] Statute 169A.20). I understand the risk if I choose to drive or operate any vehicle or machinery.    I understand that if I do not follow any of the conditions above, my prescriptions or treatment may be stopped or changed.   I agree that my provider, clinic care team and pharmacy may work with any city, state or federal law enforcement agency that investigates the misuse, sale or other diversion of my controlled medicine. I will allow my provider to discuss my care with, or share a copy of, this agreement with any other treating provider, pharmacy or emergency room where I receive care.     I have read this agreement and have asked questions about anything I did not understand.    ________________________________________________________  Patient Signature - Leopoldo Rangel     ___________________                   Date     ________________________________________________________  Provider Signature - April Vanessa MD       ___________________                   Date      ________________________________________________________  Witness Signature (required if provider not present while patient signing)          ___________________                   Date

## 2024-05-17 NOTE — PROGRESS NOTES
Preoperative Evaluation  [address] [address]  Phone: [phone]  Fax: [phone]  Primary Provider: April Vanessa MD  Pre-op Performing Provider: April Vanessa MD  May 17, 2024       Surgical Information  Surgery/Procedure: Bilateral Simple Scrotal Orchiectomy   [place] [facility] [place]   [place]geon: Kevin Wright MD   Surgery Date:  6/3/2024   Time of Surgery: 1:35 PM   Where patient plans to recover: At home with family, wife, off work for 3 days - possibly longer  Fax number for surgical facility: Note does not need to be faxed, will be available electronically in Epic.    Assessment & Plan     The proposed surgical procedure is considered INTERMEDIATE risk.    Preop general physical exam  Cleared for surgery    Gender dysphoria in adult  Currently at 2mg BID PO estradiol, plan to continue at same dose throughout surgery. Will reevaluate with visit and labs ~1-2 months after surgery and adjust dose then if needed      - No identified additional risk factors other than previously addressed    Preoperative Medication Instructions  Stop Cialis 3 days before surgery  Vyvanse - do not take day of surgery  Take all other meds as usual    Recommendation  Approval given to proceed with proposed procedure, without further diagnostic evaluation.    Andrea Thomas is a 33 year old, presenting for the following:  Pre-Op Exam          5/17/2024     8:12 AM   Additional Questions   Roomed by Adriano         5/17/2024    Information    services provided? No     HPI related to upcoming procedure: Gender dysphoria, started [facility] 03/2023. Spironolactone already stopped >6 months ago due to brain fog side effects.     [facility]  Start 3/24/23  Meds 100mg spironolactone, 2mg BID estradiol  Labs 6/13/23  TT=14, estradiol = 142, K=3.9  Labs 9/21/23 TT=9, Estradiol=184, K=4.2  Stopped 10/04/2023 50mg spironolactone (brain fog  d/t)  Labs 3/14/24 TT=18 ,Estradiol=283, K=4.7        5/11/2024   Pre-Op Questionnaire   Have you ever had a heart attack or stroke? No   Have you ever had surgery on your heart or blood vessels, such as a stent placement, a coronary artery bypass, or surgery on an artery in your head, neck, heart, or legs? No   Do you have chest pain with activity? No   Do you have a history of heart failure? No   Do you currently have a cold, bronchitis or symptoms of other infection? No   Do you have a cough, shortness of breath, or wheezing? No   Do you or anyone in your family have previous history of blood clots? UNKNOWN -    Do you or does anyone in your family have a serious bleeding problem such as prolonged bleeding following surgeries or cuts? No   Have you ever had problems with anemia or been told to take iron pills? No   Have you had any abnormal blood loss such as black, tarry or bloody stools? No   Have you had any abnormal blood loss such as black, tarry or bloody stools, or abnormal vaginal bleeding? No   Have you ever had a blood transfusion? No   Are you willing to have a blood transfusion if it is medically needed before, during, or after your surgery? Yes   Have you or any of your relatives ever had problems with anesthesia? UNKNOWN -    Do you have sleep apnea, excessive snoring or daytime drowsiness? No   Do you have any artifical heart valves or other implanted medical devices like a pacemaker, defibrillator, or continuous glucose monitor? No   Do you have artificial joints? No   Are you allergic to latex? No   Is there any chance that you may be pregnant? No     Health Care Directive  Patient does not have a Health Care Directive or Living Will: Discussed advance care planning with patient; information given to patient to review.    Preoperative Review of    reviewed - controlled substances reflected in medication list.        Patient Active Problem List    Diagnosis Date Noted    Gender dysphoria in  adult 10/27/2022     Priority: Medium    Dyslexia 03/14/2022     Priority: Medium     Diagnosed by neuropsych 8/2011      Attention deficit hyperactivity disorder (ADHD), predominantly inattentive type 03/11/2022     Priority: Medium     Diagnosed by neuropsych 8/26/2011.  Formal testing scanned into Epic Media Tab      Skin lesion 03/11/2022     Priority: Medium     referred dermatology      Intractable chronic migraine without aura and without status migrainosus 07/14/2020     Priority: Medium    Mild anxiety 07/14/2020     Priority: Medium     2022: Since college.  Sertraline works well.  Refilled today.  Also: discussed importance of exercise, sleep, meditation/mindfulness as part of anxiety management        No past medical history on file.  No past surgical history on file.  Current Outpatient Medications   Medication Sig Dispense Refill    estradiol (ESTRACE) 2 MG tablet Take 1 tablet (2 mg) by mouth 2 times daily 180 tablet 3    lisdexamfetamine (VYVANSE) 30 MG capsule Take 1 capsule (30 mg) by mouth daily for 30 days 30 capsule 0    [START ON 5/25/2024] lisdexamfetamine (VYVANSE) 30 MG capsule Take 1 capsule (30 mg) by mouth daily for 30 days 30 capsule 0    [START ON 6/26/2024] lisdexamfetamine (VYVANSE) 30 MG capsule Take 1 capsule (30 mg) by mouth daily for 30 days 30 capsule 0    sertraline (ZOLOFT) 100 MG tablet Take 1.5 tablets (150 mg) by mouth daily 135 tablet 3    tadalafil (CIALIS) 2.5 MG tablet Take 1 tablet (2.5 mg) by mouth daily 90 tablet 3       No Known Allergies     Social History     Tobacco Use    Smoking status: Never    Smokeless tobacco: Never   Substance Use Topics    Alcohol use: Yes     Comment: occ.       History   Drug Use Unknown           Review of Systems   Constitutional:  Negative for chills and fever.   HENT:  Negative for congestion, rhinorrhea and sore throat.    Respiratory:  Negative for cough and shortness of breath.    Cardiovascular:  Negative for chest pain.  "  Gastrointestinal:  Negative for abdominal pain, constipation, diarrhea, nausea and vomiting.   Genitourinary:  Negative for dysuria.   Skin:  Negative for rash.   Neurological:  Negative for dizziness and headaches.   Hematological:  Does not bruise/bleed easily.         Objective    /84   Pulse 80   Temp 98.2  F (36.8  C) (Oral)   Resp 16   Ht 1.689 m (5' 6.5\")   Wt 71.4 kg (157 lb 4.8 oz)   SpO2 96%   BMI 25.01 kg/m     Estimated body mass index is 25.01 kg/m  as calculated from the following:    Height as of this encounter: 1.689 m (5' 6.5\").    Weight as of this encounter: 71.4 kg (157 lb 4.8 oz).  "

## 2024-05-31 ENCOUNTER — ANESTHESIA EVENT (OUTPATIENT)
Dept: SURGERY | Facility: AMBULATORY SURGERY CENTER | Age: 34
End: 2024-05-31
Payer: COMMERCIAL

## 2024-06-03 ENCOUNTER — ANESTHESIA (OUTPATIENT)
Dept: SURGERY | Facility: AMBULATORY SURGERY CENTER | Age: 34
End: 2024-06-03
Payer: COMMERCIAL

## 2024-06-03 ENCOUNTER — HOSPITAL ENCOUNTER (OUTPATIENT)
Facility: AMBULATORY SURGERY CENTER | Age: 34
Discharge: HOME | End: 2024-06-03
Attending: UROLOGY
Payer: COMMERCIAL

## 2024-06-03 VITALS
DIASTOLIC BLOOD PRESSURE: 69 MMHG | WEIGHT: 147 LBS | RESPIRATION RATE: 14 BRPM | OXYGEN SATURATION: 99 % | HEIGHT: 66 IN | TEMPERATURE: 97.6 F | SYSTOLIC BLOOD PRESSURE: 107 MMHG | HEART RATE: 85 BPM | BODY MASS INDEX: 23.63 KG/M2

## 2024-06-03 DIAGNOSIS — F64.0 GENDER DYSPHORIA IN ADULT: Primary | ICD-10-CM

## 2024-06-03 PROCEDURE — 88305 TISSUE EXAM BY PATHOLOGIST: CPT | Mod: 26 | Performed by: PATHOLOGY

## 2024-06-03 PROCEDURE — 54520 REMOVAL OF TESTIS: CPT | Mod: KX | Performed by: UROLOGY

## 2024-06-03 PROCEDURE — 54520 REMOVAL OF TESTIS: CPT | Performed by: ANESTHESIOLOGY

## 2024-06-03 PROCEDURE — 88302 TISSUE EXAM BY PATHOLOGIST: CPT | Mod: TC | Performed by: UROLOGY

## 2024-06-03 PROCEDURE — 54520 REMOVAL OF TESTIS: CPT | Mod: RT

## 2024-06-03 PROCEDURE — 54520 REMOVAL OF TESTIS: CPT | Performed by: NURSE ANESTHETIST, CERTIFIED REGISTERED

## 2024-06-03 RX ORDER — CEFAZOLIN SODIUM 2 G/50ML
2 SOLUTION INTRAVENOUS SEE ADMIN INSTRUCTIONS
Status: DISCONTINUED | OUTPATIENT
Start: 2024-06-03 | End: 2024-06-03 | Stop reason: HOSPADM

## 2024-06-03 RX ORDER — FENTANYL CITRATE 50 UG/ML
INJECTION, SOLUTION INTRAMUSCULAR; INTRAVENOUS PRN
Status: DISCONTINUED | OUTPATIENT
Start: 2024-06-03 | End: 2024-06-03

## 2024-06-03 RX ORDER — SODIUM CHLORIDE, SODIUM LACTATE, POTASSIUM CHLORIDE, CALCIUM CHLORIDE 600; 310; 30; 20 MG/100ML; MG/100ML; MG/100ML; MG/100ML
INJECTION, SOLUTION INTRAVENOUS CONTINUOUS
Status: DISCONTINUED | OUTPATIENT
Start: 2024-06-03 | End: 2024-06-03 | Stop reason: HOSPADM

## 2024-06-03 RX ORDER — ONDANSETRON 2 MG/ML
4 INJECTION INTRAMUSCULAR; INTRAVENOUS EVERY 30 MIN PRN
Status: DISCONTINUED | OUTPATIENT
Start: 2024-06-03 | End: 2024-06-03 | Stop reason: HOSPADM

## 2024-06-03 RX ORDER — PROPOFOL 10 MG/ML
INJECTION, EMULSION INTRAVENOUS CONTINUOUS PRN
Status: DISCONTINUED | OUTPATIENT
Start: 2024-06-03 | End: 2024-06-03

## 2024-06-03 RX ORDER — DEXAMETHASONE SODIUM PHOSPHATE 10 MG/ML
4 INJECTION, SOLUTION INTRAMUSCULAR; INTRAVENOUS
Status: DISCONTINUED | OUTPATIENT
Start: 2024-06-03 | End: 2024-06-03 | Stop reason: HOSPADM

## 2024-06-03 RX ORDER — CEFAZOLIN SODIUM 2 G/50ML
2 SOLUTION INTRAVENOUS
Status: COMPLETED | OUTPATIENT
Start: 2024-06-03 | End: 2024-06-03

## 2024-06-03 RX ORDER — FENTANYL CITRATE 50 UG/ML
25 INJECTION, SOLUTION INTRAMUSCULAR; INTRAVENOUS EVERY 5 MIN PRN
Status: DISCONTINUED | OUTPATIENT
Start: 2024-06-03 | End: 2024-06-03 | Stop reason: HOSPADM

## 2024-06-03 RX ORDER — GLYCOPYRROLATE 0.2 MG/ML
INJECTION, SOLUTION INTRAMUSCULAR; INTRAVENOUS PRN
Status: DISCONTINUED | OUTPATIENT
Start: 2024-06-03 | End: 2024-06-03

## 2024-06-03 RX ORDER — DEXAMETHASONE SODIUM PHOSPHATE 10 MG/ML
4 INJECTION, SOLUTION INTRAMUSCULAR; INTRAVENOUS
Status: DISCONTINUED | OUTPATIENT
Start: 2024-06-03 | End: 2024-06-04 | Stop reason: HOSPADM

## 2024-06-03 RX ORDER — ONDANSETRON 2 MG/ML
INJECTION INTRAMUSCULAR; INTRAVENOUS PRN
Status: DISCONTINUED | OUTPATIENT
Start: 2024-06-03 | End: 2024-06-03

## 2024-06-03 RX ORDER — NALOXONE HYDROCHLORIDE 0.4 MG/ML
0.1 INJECTION, SOLUTION INTRAMUSCULAR; INTRAVENOUS; SUBCUTANEOUS
Status: DISCONTINUED | OUTPATIENT
Start: 2024-06-03 | End: 2024-06-03 | Stop reason: HOSPADM

## 2024-06-03 RX ORDER — OXYCODONE HYDROCHLORIDE 5 MG/1
10 TABLET ORAL
Status: DISCONTINUED | OUTPATIENT
Start: 2024-06-03 | End: 2024-06-04 | Stop reason: HOSPADM

## 2024-06-03 RX ORDER — ACETAMINOPHEN 325 MG/1
975 TABLET ORAL ONCE
Status: COMPLETED | OUTPATIENT
Start: 2024-06-03 | End: 2024-06-03

## 2024-06-03 RX ORDER — MAGNESIUM HYDROXIDE 1200 MG/15ML
LIQUID ORAL PRN
Status: DISCONTINUED | OUTPATIENT
Start: 2024-06-03 | End: 2024-06-03 | Stop reason: HOSPADM

## 2024-06-03 RX ORDER — HYDROMORPHONE HYDROCHLORIDE 1 MG/ML
0.2 INJECTION, SOLUTION INTRAMUSCULAR; INTRAVENOUS; SUBCUTANEOUS EVERY 5 MIN PRN
Status: DISCONTINUED | OUTPATIENT
Start: 2024-06-03 | End: 2024-06-03 | Stop reason: HOSPADM

## 2024-06-03 RX ORDER — OXYCODONE HYDROCHLORIDE 5 MG/1
5 TABLET ORAL EVERY 6 HOURS PRN
Qty: 12 TABLET | Refills: 0 | Status: SHIPPED | OUTPATIENT
Start: 2024-06-03 | End: 2024-06-06

## 2024-06-03 RX ORDER — ONDANSETRON 4 MG/1
4 TABLET, ORALLY DISINTEGRATING ORAL EVERY 30 MIN PRN
Status: DISCONTINUED | OUTPATIENT
Start: 2024-06-03 | End: 2024-06-03 | Stop reason: HOSPADM

## 2024-06-03 RX ORDER — ONDANSETRON 4 MG/1
4 TABLET, ORALLY DISINTEGRATING ORAL EVERY 30 MIN PRN
Status: DISCONTINUED | OUTPATIENT
Start: 2024-06-03 | End: 2024-06-04 | Stop reason: HOSPADM

## 2024-06-03 RX ORDER — DEXAMETHASONE SODIUM PHOSPHATE 4 MG/ML
INJECTION, SOLUTION INTRA-ARTICULAR; INTRALESIONAL; INTRAMUSCULAR; INTRAVENOUS; SOFT TISSUE PRN
Status: DISCONTINUED | OUTPATIENT
Start: 2024-06-03 | End: 2024-06-03

## 2024-06-03 RX ORDER — NALOXONE HYDROCHLORIDE 0.4 MG/ML
0.1 INJECTION, SOLUTION INTRAMUSCULAR; INTRAVENOUS; SUBCUTANEOUS
Status: DISCONTINUED | OUTPATIENT
Start: 2024-06-03 | End: 2024-06-04 | Stop reason: HOSPADM

## 2024-06-03 RX ORDER — LIDOCAINE HYDROCHLORIDE 20 MG/ML
INJECTION, SOLUTION INFILTRATION; PERINEURAL PRN
Status: DISCONTINUED | OUTPATIENT
Start: 2024-06-03 | End: 2024-06-03

## 2024-06-03 RX ORDER — LIDOCAINE 40 MG/G
CREAM TOPICAL
Status: DISCONTINUED | OUTPATIENT
Start: 2024-06-03 | End: 2024-06-03 | Stop reason: HOSPADM

## 2024-06-03 RX ORDER — FENTANYL CITRATE 50 UG/ML
50 INJECTION, SOLUTION INTRAMUSCULAR; INTRAVENOUS EVERY 5 MIN PRN
Status: DISCONTINUED | OUTPATIENT
Start: 2024-06-03 | End: 2024-06-03 | Stop reason: HOSPADM

## 2024-06-03 RX ORDER — FENTANYL CITRATE 50 UG/ML
25 INJECTION, SOLUTION INTRAMUSCULAR; INTRAVENOUS
Status: DISCONTINUED | OUTPATIENT
Start: 2024-06-03 | End: 2024-06-04 | Stop reason: HOSPADM

## 2024-06-03 RX ORDER — BUPIVACAINE HYDROCHLORIDE AND EPINEPHRINE 5; 5 MG/ML; UG/ML
INJECTION, SOLUTION PERINEURAL PRN
Status: DISCONTINUED | OUTPATIENT
Start: 2024-06-03 | End: 2024-06-03 | Stop reason: HOSPADM

## 2024-06-03 RX ORDER — PROPOFOL 10 MG/ML
INJECTION, EMULSION INTRAVENOUS PRN
Status: DISCONTINUED | OUTPATIENT
Start: 2024-06-03 | End: 2024-06-03

## 2024-06-03 RX ORDER — GABAPENTIN 300 MG/1
300 CAPSULE ORAL
Status: COMPLETED | OUTPATIENT
Start: 2024-06-03 | End: 2024-06-03

## 2024-06-03 RX ORDER — OXYCODONE HYDROCHLORIDE 5 MG/1
5 TABLET ORAL
Status: DISCONTINUED | OUTPATIENT
Start: 2024-06-03 | End: 2024-06-04 | Stop reason: HOSPADM

## 2024-06-03 RX ORDER — HYDROMORPHONE HYDROCHLORIDE 1 MG/ML
0.4 INJECTION, SOLUTION INTRAMUSCULAR; INTRAVENOUS; SUBCUTANEOUS EVERY 5 MIN PRN
Status: DISCONTINUED | OUTPATIENT
Start: 2024-06-03 | End: 2024-06-03 | Stop reason: HOSPADM

## 2024-06-03 RX ORDER — ONDANSETRON 2 MG/ML
4 INJECTION INTRAMUSCULAR; INTRAVENOUS EVERY 30 MIN PRN
Status: DISCONTINUED | OUTPATIENT
Start: 2024-06-03 | End: 2024-06-04 | Stop reason: HOSPADM

## 2024-06-03 RX ADMIN — PROPOFOL 150 MCG/KG/MIN: 10 INJECTION, EMULSION INTRAVENOUS at 14:17

## 2024-06-03 RX ADMIN — CEFAZOLIN SODIUM 2 G: 2 SOLUTION INTRAVENOUS at 14:25

## 2024-06-03 RX ADMIN — FENTANYL CITRATE 50 MCG: 50 INJECTION, SOLUTION INTRAMUSCULAR; INTRAVENOUS at 14:17

## 2024-06-03 RX ADMIN — GABAPENTIN 300 MG: 300 CAPSULE ORAL at 12:40

## 2024-06-03 RX ADMIN — DEXAMETHASONE SODIUM PHOSPHATE 4 MG: 4 INJECTION, SOLUTION INTRA-ARTICULAR; INTRALESIONAL; INTRAMUSCULAR; INTRAVENOUS; SOFT TISSUE at 14:17

## 2024-06-03 RX ADMIN — SODIUM CHLORIDE, SODIUM LACTATE, POTASSIUM CHLORIDE, CALCIUM CHLORIDE: 600; 310; 30; 20 INJECTION, SOLUTION INTRAVENOUS at 12:45

## 2024-06-03 RX ADMIN — PROPOFOL 200 MG: 10 INJECTION, EMULSION INTRAVENOUS at 14:17

## 2024-06-03 RX ADMIN — ACETAMINOPHEN 975 MG: 325 TABLET ORAL at 12:40

## 2024-06-03 RX ADMIN — ONDANSETRON 4 MG: 2 INJECTION INTRAMUSCULAR; INTRAVENOUS at 14:25

## 2024-06-03 RX ADMIN — GLYCOPYRROLATE 0.2 MG: 0.2 INJECTION, SOLUTION INTRAMUSCULAR; INTRAVENOUS at 14:17

## 2024-06-03 RX ADMIN — LIDOCAINE HYDROCHLORIDE 100 MG: 20 INJECTION, SOLUTION INFILTRATION; PERINEURAL at 14:17

## 2024-06-03 RX ADMIN — SODIUM CHLORIDE, SODIUM LACTATE, POTASSIUM CHLORIDE, CALCIUM CHLORIDE: 600; 310; 30; 20 INJECTION, SOLUTION INTRAVENOUS at 14:11

## 2024-06-03 NOTE — DISCHARGE INSTRUCTIONS
"SURGEONS INSTRUCTIONS:  Ok to shower day after surgery.  No heavy lifting > 15 lbs for 2-3 weeks after surgery.  Ok to resume hormones.  Bath tub ok in 1 week.  Some swelling/bruising is normal. All skin sutures are absorbable and will fall out by 6-8 weeks.  [facility]  Home Care Following Anesthesia  For 24 hours after surgery:  Get plenty of rest.  A responsible adult must stay with you for at least 24 hours after you leave the surgery center.  Do not drive or use heavy equipment.  If you have weakness or tingling, don't drive or use heavy equipment until this feeling goes away.   Do not drink alcohol.   Avoid strenuous or risky activities.  Ask for help when climbing stairs.  You may feel lightheaded.  IF so, sit for a few minutes before standing.  Have someone help you get up.   If you have nausea (feel sick to your stomach): Drink only clear liquids such as apple juice, ginger ale, broth or 7-Up.  Rest may also help.  Be sure to drink enough fluids.  Move to a regular diet as you feel able.   You may have a slight fever.  Call the doctor if your fever is over 100 F (37.7 C) (taken under the tongue) or lasts longer than 24 hours.  You may have a dry mouth, a sore throat, muscle aches or trouble sleeping. These should go away after 24 hours.  Do not make important or legal decisions.   It is recommended to avoid smoking.        Today you received a Marcaine or bupivacaine block to numb the nerves near your surgery site.  This is a block using local anesthetic or \"numbing\" medication injected around the nerves to anesthetize or \"numb\" the area supplied by those nerves.  This block is injected into the muscle layer near your surgical site.  The medication may numb the location where you had surgery for 6-18 hours, but may last up to 24 hours.  If your surgical site is an arm or leg you should be careful with your affected limb, since it is possible to injure your limb without " being aware of it due to the numbing.  Until full feeling returns, you should guard against bumping or hitting your limb, and avoid extreme hot or cold temperatures on the skin.  As the block wears off, the feeling will return as a tingling or prickly sensation near your surgical site.  You will experience more discomfort from your incision as the feeling returns.  You may want to take a pain pill (a narcotic or Tylenol if this was prescribed by your surgeon) when you start to experience mild pain before the pain beccomes more severe.  If your pain medications do not control your pain you should notifiy your surgeon.    Tips for taking pain medications  To get the best pain relief possible, remember these points:  Take pain medications as directed, before pain becomes severe.  Pain medication can upset your stomach: taking it with food may help.  Constipation is a common side effect of pain medication. Drink plenty of  fluids.  Eat foods high in fiber. Take a stool softener if recommended by your doctor or pharmacist.  Do not drink alcohol, drive or operate machinery while taking pain medications.  Ask about other ways to control pain, such as with heat, ice or relaxation.    Tylenol/Acetaminophen Consumption    If you feel your pain relief is insufficient, you may take Tylenol/Acetaminophen in addition to your narcotic pain medication.   Be careful not to exceed 4,000 mg of Tylenol/Acetaminophen in a 24 hour period from all sources.  If you are taking extra strength Tylenol/acetaminophen (500 mg), the maximum dose is 8 tablets in 24 hours.  If you are taking regular strength acetaminophen (325 mg), the maximum dose is 12 tablets in 24 hours.    Call a doctor for any of the following:  Signs of infection (fever, growing tenderness at the surgery site, a large amount of drainage or bleeding, severe pain, foul-smelling drainage, redness, swelling).  It has been over 8 to 10 hours since surgery and you are still not  able to urinate (pass water).  Headache for over 24 hours.  Numbness, tingling or weakness the day after surgery (if you had spinal anesthesia).  Signs of Covid-19 infection (temperature over 100 degrees, shortness of breath, cough, loss of taste/smell, generalized body aches, persistent headache, chills, sore throat, nausea/vomiting/diarrhea)  Your doctor is:       Dr. Kevin Wright, Prostate and Urology: [phone]               Or dial [phone] and ask for the resident on call for:  Prostate Urology  For emergency care, call the:  [place] [facility]:  [phone] (TTY for hearing impaired: [phone])

## 2024-06-03 NOTE — ANESTHESIA CARE TRANSFER NOTE
Patient: Leopoldo Rangel    Procedure: Procedure(s):  Bilateral Simple Scrotal Orchiectomy       Diagnosis: Gender dysphoria in adult [F64.0]  Diagnosis Additional Information: No value filed.    Anesthesia Type:   General     Note:    Oropharynx: oropharynx clear of all foreign objects and spontaneously breathing  Level of Consciousness: awake  Oxygen Supplementation: face mask  Level of Supplemental Oxygen (L/min / FiO2): 6  Independent Airway: airway patency satisfactory and stable  Dentition: dentition unchanged  Vital Signs Stable: post-procedure vital signs reviewed and stable  Report to RN Given: handoff report given  Patient transferred to: PACU  Comments: VSS and WNL, comfortable, no PONV, report to Stuart MATTSON  Handoff Report: Identifed the Patient, Identified the Reponsible Provider, Reviewed the pertinent medical history, Discussed the surgical course, Reviewed Intra-OP anesthesia mangement and issues during anesthesia, Set expectations for post-procedure period and Allowed opportunity for questions and acknowledgement of understanding      Vitals:  Vitals Value Taken Time   BP 96/58 06/03/24 1516   Temp 36.6  C (97.8  F) 06/03/24 1516   Pulse 68 06/03/24 1517   Resp 12 06/03/24 1517   SpO2 98 % 06/03/24 1517   Vitals shown include unfiled device data.    Electronically Signed By: SHAYNA Garcia CRNA  Lorraine 3, 2024  3:18 PM

## 2024-06-03 NOTE — BRIEF OP NOTE
[facility] Brief Operative Note    Pre-operative diagnosis: Gender dysphoria in adult [F64.0]   Post-operative diagnosis * No post-op diagnosis entered *   Procedure: Procedure(s):  Bilateral Simple Scrotal Orchiectomy   Surgeon: Kevin Wright MD           Specimens: None         EBL - minimal

## 2024-06-03 NOTE — ANESTHESIA POSTPROCEDURE EVALUATION
Patient: Leopoldo Rangel    Procedure: Procedure(s):  Bilateral Simple Scrotal Orchiectomy       Anesthesia Type:  General    Note:  Disposition: Outpatient   Postop Pain Control: Uneventful            Sign Out: Well controlled pain   PONV: No   Neuro/Psych: Uneventful            Sign Out: Acceptable/Baseline neuro status   Airway/Respiratory: Uneventful            Sign Out: Acceptable/Baseline resp. status   CV/Hemodynamics: Uneventful            Sign Out: Acceptable CV status; No obvious hypovolemia; No obvious fluid overload   Other NRE: NONE   DID A NON-ROUTINE EVENT OCCUR? No           Last vitals:  Vitals Value Taken Time   /72 06/03/24 1539   Temp 37  C (98.6  F) 06/03/24 1539   Pulse 67 06/03/24 1541   Resp 11 06/03/24 1541   SpO2 96 % 06/03/24 1540   Vitals shown include unfiled device data.    Electronically Signed By: Freddie Hubbard MD  Lorraine 3, 2024  4:15 PM

## 2024-06-03 NOTE — ANESTHESIA PROCEDURE NOTES
Airway    Staff -        Anesthesiologist:  Freddie Hubbard MD       CRNA: Deb Patel APRN CRNA       Performed By: other anesthesia staffIndications and Patient Condition       Indications for airway management: nabila-procedural       Induction type:intravenous       Mask difficulty assessment: 1 - vent by mask    Final Airway Details       Final airway type: supraglottic airway    Supraglottic Airway Details        Type: LMA       Brand: LMA Unique       LMA size: 5    Post intubation assessment        Placement verified by: capnometry, equal breath sounds and chest rise        Number of attempts at approach: 1       Number of other approaches attempted: 0       Secured with: tape       Ease of procedure: easy       Dentition: Intact and Unchanged

## 2024-06-03 NOTE — ANESTHESIA PREPROCEDURE EVALUATION
"Anesthesia Pre-Procedure Evaluation    Patient: Leopoldo Rangel   MRN: [id number] : 1990        Procedure : Procedure(s):  Bilateral Simple Scrotal Orchiectomy          No past medical history on file.   History reviewed. No pertinent surgical history.   No Known Allergies   Social History     Tobacco Use    Smoking status: Never    Smokeless tobacco: Never   Substance Use Topics    Alcohol use: Yes     Comment: occ.      Wt Readings from Last 1 Encounters:   24 66.7 kg (147 lb)        Anesthesia Evaluation   Pt has not had prior anesthetic         ROS/MED HX  ENT/Pulmonary:  - neg pulmonary ROS     Neurologic:     (+)      migraines,                          Cardiovascular:  - neg cardiovascular ROS     METS/Exercise Tolerance:     Hematologic:       Musculoskeletal:       GI/Hepatic:  - neg GI/hepatic ROS  (-) GERD   Renal/Genitourinary: Comment: Gender dysphoria      Endo:       Psychiatric/Substance Use:     (+) psychiatric history anxiety (ADHD)       Infectious Disease:       Malignancy:       Other:            Physical Exam    Airway        Mallampati: I   TM distance: > 3 FB   Neck ROM: full   Mouth opening: > 3 cm    Respiratory Devices and Support         Dental       (+) Minor Abnormalities - some fillings, tiny chips      Cardiovascular             Pulmonary                   OUTSIDE LABS:  CBC:   Lab Results   Component Value Date    HGB 13.8 2024    HGB 13.9 2023     BMP:   Lab Results   Component Value Date     2024     2023    POTASSIUM 4.7 2024    POTASSIUM 4.2 2023    CHLORIDE 102 2024    CHLORIDE 102 2023    CO2 27 2024    CO2 25 2023    BUN 10.9 2024    BUN 13.1 2023    CR 0.90 2024    CR 0.89 2023    GLC 91 2024    GLC 95 2023     COAGS: No results found for: \"PTT\", \"INR\", \"FIBR\"  POC: No results found for: \"BGM\", \"HCG\", \"HCGS\"  HEPATIC:   Lab Results   Component Value Date    " ALBUMIN 4.6 09/21/2023    PROTTOTAL 7.1 09/21/2023    ALT 14 09/21/2023    AST 21 09/21/2023    ALKPHOS 55 09/21/2023    BILITOTAL 0.3 09/21/2023     OTHER:   Lab Results   Component Value Date    NIKOLAS 9.7 03/14/2024       Anesthesia Plan    ASA Status:  1    NPO Status:  NPO Appropriate    Anesthesia Type: General.     - Airway: LMA   Induction: Intravenous, Propofol.   Maintenance: Balanced.        Consents    Anesthesia Plan(s) and associated risks, benefits, and realistic alternatives discussed. Questions answered and patient/representative(s) expressed understanding.     - Discussed:     - Discussed with:  Patient      - Extended Intubation/Ventilatory Support Discussed: No.      - Patient is DNR/DNI Status: No          Postoperative Care    Pain management: IV analgesics, Oral pain medications, Multi-modal analgesia.   PONV prophylaxis: Ondansetron (or other 5HT-3), Dexamethasone or Solumedrol, Background Propofol Infusion     Comments:               Freddie Hubbard MD    I have reviewed the pertinent notes and labs in the chart from the past 30 days and (re)examined the patient.  Any updates or changes from those notes are reflected in this note.

## 2024-06-04 NOTE — OP NOTE
"PREOPERATIVE DIAGNOSIS:            Gender dysphoria  POSTOPERATIVE DIAGNOSIS:                      Same    PROCEDURES PERFORMED:   bilateral simple scrotal orchiectomy    SURGEON:      Kevin Wright MD  ANESTHESIA:            GETA    ESTIMATED BLOOD LOSS: 5 mL.   IV FLUIDS: see dictated anesthesia record  COMPLICATIONS: None.   SPECIMEN:    bilateral testicle and spermatic cord up to the level of the external ring     SIGNIFICANT FINDINGS:   Ligation of the cord at the level approximately of the external ring; testicle excised en bloc.    BRIEF OPERATIVE INDICATIONS: Leopoldo \"Martha\" Claire is a 33 year trans female wishing to undergo bilateral orchiectomy for gender affirmation surgery. She meets WPATH criteria.     DESCRIPTION OF PROCEDURE: After full informed voluntary consent was obtained, the patient was transported to the operating room, placed supine on the table. After adequate anesthesia was induced, they were prepped and draped in the usual sterile fashion. A timeout was taken to confirm correct patient, procedure and laterality      We began the procedure by marking a 3 cm scrotal incision beginning at the penoscrotal junction at the midline raphe.  Incision was made using a scalpel and electrocautery was used to carry dissection down through the dartos muscle . The left testicle was delivered into the wound. Tunica vaginalis was intact. Blunt and electrocautery dissection was continued proximally to mobilize the cord.  The cord was dissected superiorly up to the level of the external ring. It was clamped and divided into two segments which were then separately clamped. The cord was divided. Each segment was then stick tied with 0 silk suture. The testicle and spermatic cord were removed.  The procedure as stated above was then repeated on the right side.   Irrigation was performed and a cord block was done with 0.5% Marcaine on remnant of spermatic cord bilaterally. Hemostasis was excellent.      The " dartos was closed with a running 3-0 vicryl suture followed by the skin with running 4-0 monocryl suture in horizontal mattress fashion. Bacitracin, fluffs, and a scrotal support were applied.      Patient tolerated the procedure well. No apparent complications. She was transported to the postanesthesia care unit in stable condition          As attending surgeon, Kevin AGUILAR MD, was scrubbed and present for the entire procedure.

## 2024-06-06 LAB
PATH REPORT.COMMENTS IMP SPEC: NORMAL
PATH REPORT.COMMENTS IMP SPEC: NORMAL
PATH REPORT.FINAL DX SPEC: NORMAL
PATH REPORT.GROSS SPEC: NORMAL
PATH REPORT.MICROSCOPIC SPEC OTHER STN: NORMAL
PATH REPORT.RELEVANT HX SPEC: NORMAL
PHOTO IMAGE: NORMAL

## 2024-06-13 ENCOUNTER — TELEPHONE (OUTPATIENT)
Dept: OTOLARYNGOLOGY | Facility: CLINIC | Age: 34
End: 2024-06-13
Payer: COMMERCIAL

## 2024-06-13 NOTE — TELEPHONE ENCOUNTER
Patient confirmed scheduled appointment:  Date: 10/16/ 11/1  Time: 1pm/ 8am  Visit type: Return SLP Voice  Provider: Mayuri Barton  Location: virtual   Testing/imaging:   Additional notes:

## 2024-06-20 ENCOUNTER — VIRTUAL VISIT (OUTPATIENT)
Dept: PLASTIC SURGERY | Facility: CLINIC | Age: 34
End: 2024-06-20
Payer: COMMERCIAL

## 2024-06-20 VITALS — WEIGHT: 147 LBS | BODY MASS INDEX: 23.63 KG/M2 | HEIGHT: 66 IN

## 2024-06-20 DIAGNOSIS — Z90.79 STATUS POST ORCHIECTOMY: Primary | ICD-10-CM

## 2024-06-20 PROCEDURE — 99024 POSTOP FOLLOW-UP VISIT: CPT | Mod: 95 | Performed by: NURSE PRACTITIONER

## 2024-06-20 ASSESSMENT — PAIN SCALES - GENERAL: PAINLEVEL: NO PAIN (0)

## 2024-06-20 NOTE — NURSING NOTE
Is the patient currently in the state of [place]? YES    Visit mode:VIDEO    If the visit is dropped, the patient can be reconnected by: VIDEO VISIT: Text to cell phone:   Telephone Information:   Mobile [phone]       Will anyone else be joining the visit? NO  (If patient encounters technical issues they should call [phone]    How would you like to obtain your AVS? MyChart    Are changes needed to the allergy or medication list? No    Are refills needed on medications prescribed by this physician? NO     Reason for visit: RECHECK    Wt other than 24 hrs:    Pain more than one location:  no  Mary AVELAR

## 2024-06-20 NOTE — PROGRESS NOTES
"Virtual Visit Details    Type of service:  Video Visit     Originating Location (pt. Location): Home    Distant Location (provider location):  Off-site  Platform used for Video Visit: [place]    SUBJECTIVE:  Martha is a 33 year old who underwent simple scrotal orchiectomy with Dr Wright on 6/3/2024. She is doing well.   She has some residual pain along left inguinal canal which is mostly mild soreness. 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 She also feels a small swelling in left side scrotum and wonders if this is normal. It is close to left inguinal canal. No concerns about her incision. No wounds or drainage. She has plans to follow-up with HRT provider to check levels now that testicles are gone.  She is still considering possible FD vaginoplasty, but not 100% sure. Would like hair removal resources to have on hand.    OBJECTIVE:  Ht 1.676 m (5' 6\")   Wt 66.7 kg (147 lb)   BMI 23.73 kg/m     General: NAD  Respiratory: Respirations unlabored  : deferred due to virtual visit    ASSESSMENT/PLAN:  S/P gender affirming bilateral orchiectomy  Reassured patient that some soreness and swelling are normal at this point in the post-op healing. Reassured her that these symptoms should continue to improve over the next month or so. Reviewed reasons to contact our team.  Okay to return to normal activities as tolerated  Hair removal resources sent via AVS and next steps toward vaginoplasty reviewed.  Patient to follow-up prn     SHAYNA Corral, CNP    A total of 20 minutes was spent today with patient, reviewing records, completing charting, and other tasks as detailed above.   "

## 2024-06-20 NOTE — PATIENT INSTRUCTIONS
It was great to talk with you today, Martha.  I am glad you are feeling good overall since your surgery.  Like we talked about, the soreness and swelling you are still experiencing is normal and should continue to improve over the next several weeks. Please reach out if it persists or you have more concerns.    I will attach the hair removal resources below for your reference.    Hair Removal Referrals     Previous patients identified these locations as being safe and positive for trans folks. If you have other recommendations or have a negative experience, please let us know.     [facility] (will submit prior authorization to insurance)   Laser hair removal  [address] [place] [address]   [email or web address]  [phone]   Does not take Medicaid or [employer]    [facility] (will submit prior authorization to insurance)   Laser hair removal  [facility] & Other office locations  [email or web address]  [phone]  *In [facility] and [employer]    [facility]  Appointments: [phone]  [place], [place]  [email or web address]   *Works with [facility] commercial insurance only (No BluePlus)   Otherwise, services are self-pay     [facility] (July, owner, trans owned)   [address]  [place] [address]  [email or web address]  [phone]  *Not taking new patients as of Jan. 2024  *does not offer facial hair removal    [facility]  [address]  [place], [place] [id number] [phone]     [facility]  *Laser hair removal and electrolysis   [address] [place] [address]   [email or web address]   [phone]      [facility]  [address] [place] [address]  [email or web address]  [phone]     [facility]  [address]  [facility] [place]  "[address]  [email or web address]  [phone]    [facility]  [address]   [place] [address]  [email or web address]  [phone]     [facility] [place] location  [address]   [place], [place] [address]  [email or web address]  [email or web address]    [facility]  [email or web address]  [phone]  Locations:   [place]: [phone]  [place]: [phone]  [place]: [phone]  [place]: [phone]    [facility]  [address],   [place] [address]  [email or web address]  [phone]    [facility]   [address] [place], [place]   [phone]   [email or web address]     [facility]   [address]   [address]   [place], [place]   [phone]      ---  Tips on Insurance Coverage for Hair Removal    If you obtain hair removal (HR) services from a location that does not accept insurance, you may be able to have insurance cover some portion of HR cost. [facility] CANNOT submit the prior authorization for hair removal if you seek this service outside of [facility]. Here are some steps to take to determine if this is a possibility:    1.Obtain your medical policy on gender affirming care.   2. Obtain your medical policy section on exclusions.   3. Check to see if they cover or exclude hair removal. Read all the fine print.   4. Call your insurance company and ask if they will cover \"medically necessary hair removal\" for gender affirmation surgery (e.g. for vaginoplasty, phalloplasty, etc.)  CPT code 81130: Unlisted procedure, skin, mucous membrane and subcutaneous tissue  5. Ask them what you need to do to get these services covered.   Can you get reimbursed for HR services or do you need to obtain pre-approval?   What documentation do you need to submit?  If you need your surgeon's consultation note, you will " need to work with the medical facilities records department. [facility]'s records department: [email or web address], phone #: [phone]. You will need to complete the release of information to obtain your records. The link above allows you to fill this out online.      6. If you need help with an insurance denial for gender affirming care, please reach out to [facility]'s legal department. Please contact them at [email or web address]

## 2024-06-20 NOTE — LETTER
"6/20/2024       RE: Leopoldo Rangel  [address]  [facility] [address]       Dear Colleague,    Thank you for referring your patient, Leopoldo Rangel, to the [facility] [place] [facility]. Please see a copy of my visit note below.      SUBJECTIVE:  Martha is a 33 year old who underwent simple scrotal orchiectomy with Dr Wright on 6/3/2024. She is doing well.   She has some residual pain along left inguinal canal which is mostly mild soreness. 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 She also feels a small swelling in left side scrotum and wonders if this is normal. It is close to left inguinal canal. No concerns about her incision. No wounds or drainage. She has plans to follow-up with HRT provider to check levels now that testicles are gone.  She is still considering possible FD vaginoplasty, but not 100% sure. Would like hair removal resources to have on hand.    OBJECTIVE:  Ht 1.676 m (5' 6\")   Wt 66.7 kg (147 lb)   BMI 23.73 kg/m     General: NAD  Respiratory: Respirations unlabored  : deferred due to virtual visit    ASSESSMENT/PLAN:  S/P gender affirming bilateral orchiectomy  Reassured patient that some soreness and swelling are normal at this point in the post-op healing. Reassured her that these symptoms should continue to improve over the next month or so. Reviewed reasons to contact our team.  Okay to return to normal activities as tolerated  Hair removal resources sent via AVS and next steps toward vaginoplasty reviewed.  Patient to follow-up prn         A total of 20 minutes was spent today with patient, reviewing records, completing charting, and other tasks as detailed above.       Again, thank you for allowing me to participate in the care of your patient.      Sincerely,    SHAYNA Hammond CNP    "

## 2024-06-25 ENCOUNTER — MYC MEDICAL ADVICE (OUTPATIENT)
Dept: FAMILY MEDICINE | Facility: CLINIC | Age: 34
End: 2024-06-25
Payer: COMMERCIAL

## 2024-08-22 ENCOUNTER — MYC MEDICAL ADVICE (OUTPATIENT)
Dept: FAMILY MEDICINE | Facility: CLINIC | Age: 34
End: 2024-08-22
Payer: COMMERCIAL

## 2024-08-22 DIAGNOSIS — F90.0 ATTENTION DEFICIT HYPERACTIVITY DISORDER (ADHD), PREDOMINANTLY INATTENTIVE TYPE: Primary | ICD-10-CM

## 2024-09-03 ENCOUNTER — MYC MEDICAL ADVICE (OUTPATIENT)
Dept: FAMILY MEDICINE | Facility: CLINIC | Age: 34
End: 2024-09-03
Payer: COMMERCIAL

## 2024-09-03 DIAGNOSIS — F64.0 GENDER DYSPHORIA IN ADULT: ICD-10-CM

## 2024-09-03 DIAGNOSIS — F90.0 ATTENTION DEFICIT HYPERACTIVITY DISORDER (ADHD), PREDOMINANTLY INATTENTIVE TYPE: Primary | ICD-10-CM

## 2024-09-03 RX ORDER — LISDEXAMFETAMINE DIMESYLATE 30 MG/1
30 CAPSULE ORAL EVERY MORNING
Qty: 30 CAPSULE | Refills: 0 | Status: SHIPPED | OUTPATIENT
Start: 2024-09-03

## 2024-09-03 NOTE — TELEPHONE ENCOUNTER
"Last script for Vyvanse  on 24, unable to queue up the medication    Request for medication refill:    Providers if patient needs an appointment and you are willing to give a one month supply please refill for one month and  send a letter/MyChart using \".SMILLIMITEDREFILL\" .smillimited and route chart to \"P SMI \" (Giving one month refill in non controlled medications is strongly recommended before denial)    If refill has been denied, meaning absolutely no refills without visit, please complete the smart phrase \".smirxrefuse\" and route it to the \"P SMI MED REFILLS\"  pool to inform the patient and the pharmacy.    Estefanía Gomez RN      "

## 2024-09-11 ENCOUNTER — VIRTUAL VISIT (OUTPATIENT)
Dept: OTOLARYNGOLOGY | Facility: CLINIC | Age: 34
End: 2024-09-11
Payer: COMMERCIAL

## 2024-09-11 DIAGNOSIS — R49.9 VOICE AND RESONANCE DISORDER: ICD-10-CM

## 2024-09-11 DIAGNOSIS — F64.0 GENDER DYSPHORIA IN ADULT: ICD-10-CM

## 2024-09-11 DIAGNOSIS — R49.0 DYSPHONIA: Primary | ICD-10-CM

## 2024-09-11 PROCEDURE — 92507 TX SP LANG VOICE COMM INDIV: CPT | Mod: GN | Performed by: SPEECH-LANGUAGE PATHOLOGIST

## 2024-09-11 NOTE — LETTER
"9/11/2024       RE: Leopoldo Rangel  [address]  [facility] [address]     Dear Colleague,    Thank you for referring your patient, Leopoldo Rangel, to the [facility] [place] [facility]. Please see a copy of my visit note below.    Claire is a 33 year old adult who is being cared for via a billable virtual visit.        The patient/client has been notified and verbally consented to the following statements:   This video visit will be conducted between you and your provider.  If during the course of the call the provider feels a video visit is not appropriate, you will not be charged for this service.    Provider has received verbal consent for billable virtual visit from the patient? Yes    Preferred method for receiving information: Signaturit     Call initiated at: 8:59 am  Platform used to conduct today's virtual appointment: AM Well Video  Location of provider: Residence  Location of patient: Residence. 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  Laryngeal function studies show significant increase in trans-glottal airflow compared to age and gender matched norms, increased inferred subglottal pressure, and significantly elevated AVQI. Ms. Rangel demonstrated good learning of early therapeutic activities.      SUBJECTIVE:  Since the last appointment, Ms. Rangel reports the following:   Overall she reports that symptoms are mild improvement  Our last appointment was in April, and she has practiced and applied therapeutic activities daily.  Anything other than bright " "and happy is difficult to maintain.     OBJECTIVE:  Ms. Rangel presents today with the following:  Voice quality:  Roughness: WNL  Breathiness: WNL  Strain: WNL   Pitch:  F0/Habitual/Conversational speech:  Around G3, which is an acceptable pitch for goals and gender.    PATIENT REPORTED MEASURES:  Patient Supplied Answers To SLP QOL Questionnaire       No data to display              Patient Supplied Answers To VHI Questionnaire      9/8/2024     2:31 PM   Voice Handicap Index (VHI-10)   My voice makes it difficult for people to hear me 0   People have difficulty understanding me in a noisy room 1   My voice difficulties restrict my personal and social life.  1   I feel left out of conversations because of my voice 0   My voice problem causes me to lose income 0   I feel as though I have to strain to produce voice 1   The clarity of my voice is unpredictable 2   My voice problem upsets me 3   My voice makes me feel handicapped 2   People ask, \"What's wrong with your voice?\" 0   VHI-10 10       THERAPEUTIC ACTIVITIES  Demonstrated previous exercises.  demonstrated improved technique  appropriate redirection provided  instruction provided for increased level of complexity/difficulty    Semi-Occluded Vocal Tract (SOVT) exercises instructed to reduce laryngeal tension, promote vocal fold pliability, and coordinate respiration and phonation  Sustained /m/ was found to be most facilitating   Sustained phonation, and voice vs. voiceless productions used to promote easy voicing and raise awareness of laryngeal tension  Ascending and descending glides utilized to promote vocal fold pliability  \"Messa di voce\", gradual crescendo and decrescendo to vary medial compression was also utilized to promote vocal fold pliability.  Instructed on the benefits of using these exercises for improved coordination of breath flow with phonation and tissue mobilization.  Instructed on the importance of using these exercises as a warm-up / " "cool down,  and to re-calibrate the voice throughout the day.     Resonant Voice Therapy (RVT) exercises to promote forward locus of resonance and optimized pattern of laryngeal adduction  Speech material that elicits a high, forward tongue position (/n/) was most facilitating  Easy descending glide on /m/ utilized in conjunction with relaxed jaw, tongue, and lightly closed lips to facilitate forward resonant sound  Use of the carrier phrase \"mmhmm\" instructed to promote generalization to everyday speech  Syllable level using /m/ in alternation with cardinal vowels on sustained pitches and speech inflection  Word level exercises featuring nasal continuant loaded stimuli  Phrase level exercises featuring nasal continuants in more complex phonemic contexts were employed  Instructed with and interval of a descending glide pattern was facilitating, prior to progressing to comfortable speech using optimal breath flow.  Able to recognize improvement in quality and comfort     Exercises in techniques for improved airflow during phonation  Progressed to easy onset/ flow, and blending phrases  Instructed with a descending 5th, as well as an arpeggio pattern; this was helpful.  [place] techniques to reduce glottal ramirez and improve breath flow; negative practice improved awareness today.     Instructed in techniques to improve length of utterance with reduced effort for optimal carryover.  Instructed with semi-scripted conversation and reading aloud using a variety of emotions; this was helpful.  Developed a mental checklist of factors to help trouble shoot moments of difficulty during daily speaking tasks.     Counseling and Education:  Asked many questions about the nature of her symptoms, and I answered all of these thoroughly.  A revised regimen for home practice was instructed.  I provided an AVS of today's therapeutic activities to facilitate practice.    Counseling and Education:  Asked many questions about the nature of " her symptoms, and I answered all of these thoroughly.  A revised regimen for home practice was instructed.  I provided an AVS of today's therapeutic activities to facilitate practice.    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  Ms. Rangel demonstrated good learning of therapeutic activities to optimize her authentic voice quality. She will continue to work on these as she is able, which has been more challenging with a recent move.    PLAN: I will see Ms. Rangel in 2 weeks, at which point we will continue thearpy.   For practice goals see AVS.     Next Clinic Appt: 9/25    TOTAL SERVICE TIME:   Call Initiated at: 8:59 a  Call Ended at: 10am           CPT Billing Codes:   TREATMENT OF SPEECH, LANGUAGE, VOICE, COMMUNICATION, and/or AUDITORY PROCESSING DISORDER (89860)    Mayuri Barton M.M. (voice), M.A., CCC/SLP  Speech-Language Pathologist  [facility] Trained Vocologist  [facility]  [phone]  [email or web address]  Pronouns: she/her      *this report was created in part through the use of computerized dictation software, and though reviewed following completion, some typographic errors may persist.  If there is confusion regarding any of this notes contents, please contact me for clarification            Again, thank you for allowing me to participate in the care of your patient.      Sincerely,    Mayuri Barton, SLP

## 2024-09-11 NOTE — PROGRESS NOTES
"Claire is a 33 year old adult who is being cared for via a billable virtual visit.        The patient/client has been notified and verbally consented to the following statements:   This video visit will be conducted between you and your provider.  If during the course of the call the provider feels a video visit is not appropriate, you will not be charged for this service.    Provider has received verbal consent for billable virtual visit from the patient? Yes    Preferred method for receiving information: Bundle [employer]art     Call initiated at: 8:59 am  Platform used to conduct today's virtual appointment: AM Well Video  Location of provider: Residence  Location of patient: Residence. 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  Laryngeal function studies show significant increase in trans-glottal airflow compared to age and gender matched norms, increased inferred subglottal pressure, and significantly elevated AVQI. Ms. Rangel demonstrated good learning of early therapeutic activities.      SUBJECTIVE:  Since the last appointment, Ms. Rangel reports the following:   Overall she reports that symptoms are mild improvement  Our last appointment was in April, and she has practiced and applied therapeutic activities daily.  Anything other than bright and happy is difficult to maintain.     OBJECTIVE:  Ms. Rangel presents today with the following:  Voice quality:  Roughness: WNL  Breathiness: WNL  Strain: WNL   Pitch:  F0/Habitual/Conversational speech:  Around G3, which is an acceptable pitch for goals and gender.    PATIENT REPORTED MEASURES:  Patient " "Supplied Answers To SLP QOL Questionnaire       No data to display              Patient Supplied Answers To VHI Questionnaire      9/8/2024     2:31 PM   Voice Handicap Index (VHI-10)   My voice makes it difficult for people to hear me 0   People have difficulty understanding me in a noisy room 1   My voice difficulties restrict my personal and social life.  1   I feel left out of conversations because of my voice 0   My voice problem causes me to lose income 0   I feel as though I have to strain to produce voice 1   The clarity of my voice is unpredictable 2   My voice problem upsets me 3   My voice makes me feel handicapped 2   People ask, \"What's wrong with your voice?\" 0   VHI-10 10       THERAPEUTIC ACTIVITIES  Demonstrated previous exercises.  demonstrated improved technique  appropriate redirection provided  instruction provided for increased level of complexity/difficulty    Semi-Occluded Vocal Tract (SOVT) exercises instructed to reduce laryngeal tension, promote vocal fold pliability, and coordinate respiration and phonation  Sustained /m/ was found to be most facilitating   Sustained phonation, and voice vs. voiceless productions used to promote easy voicing and raise awareness of laryngeal tension  Ascending and descending glides utilized to promote vocal fold pliability  \"Messa di voce\", gradual crescendo and decrescendo to vary medial compression was also utilized to promote vocal fold pliability.  Instructed on the benefits of using these exercises for improved coordination of breath flow with phonation and tissue mobilization.  Instructed on the importance of using these exercises as a warm-up / cool down,  and to re-calibrate the voice throughout the day.     Resonant Voice Therapy (RVT) exercises to promote forward locus of resonance and optimized pattern of laryngeal adduction  Speech material that elicits a high, forward tongue position (/n/) was most facilitating  Easy descending glide on /m/ " "utilized in conjunction with relaxed jaw, tongue, and lightly closed lips to facilitate forward resonant sound  Use of the carrier phrase \"mmhmm\" instructed to promote generalization to everyday speech  Syllable level using /m/ in alternation with cardinal vowels on sustained pitches and speech inflection  Word level exercises featuring nasal continuant loaded stimuli  Phrase level exercises featuring nasal continuants in more complex phonemic contexts were employed  Instructed with and interval of a descending glide pattern was facilitating, prior to progressing to comfortable speech using optimal breath flow.  Able to recognize improvement in quality and comfort     Exercises in techniques for improved airflow during phonation  Progressed to easy onset/ flow, and blending phrases  Instructed with a descending 5th, as well as an arpeggio pattern; this was helpful.  [place] techniques to reduce glottal ramirez and improve breath flow; negative practice improved awareness today.     Instructed in techniques to improve length of utterance with reduced effort for optimal carryover.  Instructed with semi-scripted conversation and reading aloud using a variety of emotions; this was helpful.  Developed a mental checklist of factors to help trouble shoot moments of difficulty during daily speaking tasks.     Counseling and Education:  Asked many questions about the nature of her symptoms, and I answered all of these thoroughly.  A revised regimen for home practice was instructed.  I provided an AVS of today's therapeutic activities to facilitate practice.    Counseling and Education:  Asked many questions about the nature of her symptoms, and I answered all of these thoroughly.  A revised regimen for home practice was instructed.  I provided an AVS of today's therapeutic activities to facilitate practice.    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  Ms. Rangel demonstrated good learning of therapeutic activities to optimize her authentic voice quality. She will continue to work on these as she is able, which has been more challenging with a recent move.    PLAN: I will see Ms. Rangel in 2 weeks, at which point we will continue thearpy.   For practice goals see AVS.     Next Clinic Appt: 9/25    TOTAL SERVICE TIME:   Call Initiated at: 8:59 a  Call Ended at: 10am           CPT Billing Codes:   TREATMENT OF SPEECH, LANGUAGE, VOICE, COMMUNICATION, and/or AUDITORY PROCESSING DISORDER (30990)    Mayuri Barton M.M. (voice), M.A., CCC/SLP  Speech-Language Pathologist  [place] Trained Vocologist  [facility]  [phone]  [email or web address]  Pronouns: she/her      *this report was created in part through the use of computerized dictation software, and though reviewed following completion, some typographic errors may persist.  If there is confusion regarding any of this notes contents, please contact me for clarification

## 2024-09-11 NOTE — PATIENT INSTRUCTIONS
"After Visit Summary    Patient: Martha Rangel  Date of Visit: 9/11/2024    These notes are also available in your MyChart. Please take a few moments to find them under \"Past Appointments\" in the [employer] system, as Mayuri will start to phase out e-mail communications.    \"Handouts\" that go along with today's order of activities include (below):   Frequency of practice: 2-3x/day unless marked otherwise    Order of today's appointment:  Breathing - shoulders down (great today!)  Forward Resonant /m/ sound      WHY:  Though these exercises seems (and feels) silly they are helpful for a number of reasons.  First, they make you use your air generously and consistently, helping you to coordinate your breath and your voice.  Second, they lengthen and narrow the vocal tract.  This narrowing (or semi-occlusion in scientific terms) creates back pressure in your throat which has been shown to help the vocal folds vibrate more easily and reduce how hard some of the other muscles are squeezing.    HOW:    Use a gentle  mmmm  like you are relaxing into a comfy chair at the end of a long day.   Make sure that your jaw is released (never clenched), and that your tongue is hanging out like a rug on the floor of your mouth  There can be a tiny  h  at the start of it to keep you from getting too tight.  Start off with the  sigh  pattern listed below.    Feel how open and relaxed your throat feels when you practice these sounds. If it gets tight, don't sweat it.  Just breath, relax, and start again.  Across all the exercises make sure you are getting a nice low breath and feeling the steady inward motion of low abdominal muscles when making sound.      Practice 5 times a day for no more than 3 minutes, and whenever you feel fatigued.    Aren't sure if you are doing it right? Ask yourself these three questions:  Does it feel easy?  Are the bubbles and voice consistent?  Do you feel that forward buzz?            What sounds to " "make:    Single pitches - use any comfortable pitch and sustain the note for as long as it feels free and easy, and your lips or tongue are bubbling.        Sighs - glide from high to low like you are sitting down in a comfortable chair after a long day of work        [place] - Start on a medium pitch and glide up just a bit and back down            \"Meet my mom\"    Phrases for Blending  Fall_over_the_chair                      Go_(w)into the store  Leave_on_the_lights                     The(e)_(y)only one  See_it_over_there                         The(e)_(y)other day  Do_it_now    Not_even her mom  Put(d)_on_your_shoes         Not_any more  Down_under_the_stairs  Cold_as ice  Not_old_enough   the ice is cold  Look_at_the_sky   he's (z)ill with the flu  The_end_of_the_story  yes_and no  High_up_on_the_shelf  one at a time            Breathing:   Shh exercise (loudness) : 1) one on one loudness 2) 2-3 people 3) 5-6 people loudness.  Feel breath in the abdominal area and not in the throat.  \"Few\"  \"Hey\"  \"Hey, pass me the box\"   \"Shape your phrase\"  A moment of a soft and then quickly grow louder.     Breathing Tips:  Keep shoulders down and chest relaxed      Mayuri Barton M.M. (voice), M.A., CCC/SLP  Speech-Language Pathologist  [facility] Certified Vocologist  [facility]  [email or web address]  she/her    "

## 2024-09-18 ENCOUNTER — LAB (OUTPATIENT)
Dept: LAB | Facility: CLINIC | Age: 34
End: 2024-09-18
Payer: COMMERCIAL

## 2024-09-18 DIAGNOSIS — F64.0 GENDER DYSPHORIA IN ADULT: ICD-10-CM

## 2024-09-18 DIAGNOSIS — F90.0 ATTENTION DEFICIT HYPERACTIVITY DISORDER (ADHD), PREDOMINANTLY INATTENTIVE TYPE: ICD-10-CM

## 2024-09-18 LAB
AMPHETAMINES UR QL: DETECTED
BARBITURATES UR QL SCN: NOT DETECTED
BENZODIAZ UR QL SCN: NOT DETECTED
BUPRENORPHINE UR QL: NOT DETECTED
CANNABINOIDS UR QL: NOT DETECTED
COCAINE UR QL SCN: NOT DETECTED
D-METHAMPHET UR QL: NOT DETECTED
ESTRADIOL SERPL-MCNC: 398 PG/ML
METHADONE UR QL SCN: NOT DETECTED
OPIATES UR QL SCN: NOT DETECTED
OXYCODONE UR QL SCN: NOT DETECTED
PCP UR QL SCN: NOT DETECTED
TRICYCLICS UR QL SCN: NOT DETECTED

## 2024-09-18 PROCEDURE — 36415 COLL VENOUS BLD VENIPUNCTURE: CPT

## 2024-09-18 PROCEDURE — 82670 ASSAY OF TOTAL ESTRADIOL: CPT

## 2024-09-18 PROCEDURE — 84403 ASSAY OF TOTAL TESTOSTERONE: CPT

## 2024-09-18 PROCEDURE — 80306 DRUG TEST PRSMV INSTRMNT: CPT

## 2024-09-19 RX ORDER — LISDEXAMFETAMINE DIMESYLATE 30 MG/1
30 CAPSULE ORAL DAILY
Qty: 30 CAPSULE | Refills: 0 | Status: SHIPPED | OUTPATIENT
Start: 2024-11-02 | End: 2024-12-02

## 2024-09-19 RX ORDER — LISDEXAMFETAMINE DIMESYLATE 30 MG/1
30 CAPSULE ORAL DAILY
Qty: 30 CAPSULE | Refills: 0 | Status: SHIPPED | OUTPATIENT
Start: 2024-12-01 | End: 2024-12-31

## 2024-09-19 RX ORDER — LISDEXAMFETAMINE DIMESYLATE 30 MG/1
30 CAPSULE ORAL DAILY
Qty: 30 CAPSULE | Refills: 0 | Status: SHIPPED | OUTPATIENT
Start: 2024-10-03 | End: 2024-10-02

## 2024-09-20 LAB — TESTOST SERPL-MCNC: 17 NG/DL (ref 8–950)

## 2024-09-25 ENCOUNTER — VIRTUAL VISIT (OUTPATIENT)
Dept: OTOLARYNGOLOGY | Facility: CLINIC | Age: 34
End: 2024-09-25
Payer: COMMERCIAL

## 2024-09-25 DIAGNOSIS — F64.0 GENDER DYSPHORIA IN ADULT: ICD-10-CM

## 2024-09-25 DIAGNOSIS — R49.9 VOICE AND RESONANCE DISORDER: ICD-10-CM

## 2024-09-25 DIAGNOSIS — R49.0 DYSPHONIA: Primary | ICD-10-CM

## 2024-09-25 PROCEDURE — 92507 TX SP LANG VOICE COMM INDIV: CPT | Mod: GN | Performed by: SPEECH-LANGUAGE PATHOLOGIST

## 2024-09-25 NOTE — PATIENT INSTRUCTIONS
"After Visit Summary    Patient: Martha Rangel  Date of Visit: 9/25/2024    These notes are also available in your MyChart. Please take a few moments to find them under \"Past Appointments\" in the [employer] system.    \"Handouts\" that go along with today's order of activities include (below):     Frequency of practice: 2-3x/day unless marked otherwise    Order of today's appointment:  Throat clearing strategy modifications  Avoid the \"pink elephant\"  Lip trills for throat clearing and exploring falsetto  \"Shoe\" 5-1 descending glide  Then, adding sentences    Use the recording to facilitate practice    Use the previous exercises to help play with these ideas and work in falsetto.     Note range:  D3 (150 Hz) below middle C.  Top note was G4 (about 400 Hz)    Falsetto work: Bb3 up to Bb4 and then finished around Ab3.    WIL Diaz, M.M., CCC-SLP  Speech-Language Pathologist - [facility] Voice Clinic Supervisor  [facility] Certified Vocologist  [facility]  [email or web address]  she/her    "

## 2024-09-25 NOTE — LETTER
"9/25/2024       RE: Martha Rangel  [address] [facility] [address]     Dear Colleague,    Thank you for referring your patient, Martha Rangel, to the [facility] VOICE CLINIC [place] at [facility]. Please see a copy of my visit note below.    lCaire is a 33 year old adult who is being cared for via a billable virtual visit.        The patient/client has been notified and verbally consented to the following statements:   This video visit will be conducted between you and your provider.  If during the course of the call the provider feels a video visit is not appropriate, you will not be charged for this service.    Provider has received verbal consent for billable virtual visit from the patient? Yes    Preferred method for receiving information: [employer]     Call initiated at: 08:04  Platform used to conduct today's virtual appointment: AM Well Video  Location of provider: Residence  Location of patient: Residence. 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  Laryngeal function studies show significant increase in trans-glottal airflow compared to age and gender matched norms, increased inferred subglottal pressure, and significantly elevated AVQI. Ms. Rangel demonstrated good learning of early therapeutic activities.      SUBJECTIVE:  Since the last appointment, Ms. Rangel reports the following:   Patient noted that her throat started feeling scratchy last night  More throat clearing (TC), which is increased today  Did some gargling and straw with water resistance to mitigate " "urge to throat clear  She feels like she is making progress, but there are some things that she is not so happy with related to her voice:  Some psychological barriers  Can switch back to older voice habits with familiar people  Hydration: ~25 oz. (\"Not ideal\" per patient report)  Patient described a congruent voice as:  1) having a smoother quality  2) sounding clearer with less urge to throat clear  3) brighter   4) a bit higher pitch  Patient described voice-related characteristics she would like to change as:  1) Too much throat clearing  2) Not enough feminine coded speech patterns (prosody, etc.)    OBJECTIVE:  Ms. Rangel presents today with the following:  Voice quality:  Roughness: WNL  Breathiness: WNL  Strain: WNL   Pitch:  F0/Habitual/Conversational speech:    Previous: Around G3, which is an acceptable pitch for goals and gender.  Today: ~B2 at beginning of session, possibly due to URI/Allergy symptoms    PATIENT REPORTED MEASURES:  Patient Supplied Answers To SLP QOL Questionnaire       No data to display              Patient Supplied Answers To VHI Questionnaire      9/21/2024    11:59 AM   Voice Handicap Index (VHI-10)   My voice makes it difficult for people to hear me 1   People have difficulty understanding me in a noisy room 1   My voice difficulties restrict my personal and social life.  2   I feel left out of conversations because of my voice 2   My voice problem causes me to lose income 0   I feel as though I have to strain to produce voice 1   The clarity of my voice is unpredictable 2   My voice problem upsets me 3   My voice makes me feel handicapped 2   People ask, \"What's wrong with your voice?\" 0   VHI-10 14       THERAPEUTIC ACTIVITIES    The patient demonstrated straw phonation with water resistance (descending and variegated pitches) and gargling with [k] phoneme immediately following. TC present immediately after both tasks    All therapeutic tasks completed chromatically unless " "otherwise noted.     The patient was instructed in the use of liptrills to reduce urge to TC and to improve respiratory-phonatory coordination.  Patient used 5-1 glide on a liptrill from D3 to B4 (C5 was attempted, but discontinued) with minimal verbal, resonatory, registration, and respiratory cues with approximately 90% accuracy.  Patient was cued to phonate in cricothyroid dominant voice production above F#4.  The patient was instructed in the use of variegated pitch-vowel patterns in skilled vocal tasks to improve use of cricothyroid dominant voice production and to improve respiratory-phonatory coordination.   The patient used 5-1 glide on /hillary/ from Bb3 to Bb4 to Ab3 with minimal resonatory, verbal, modeling, and pitch (gliding) cues with approximately 90% accuracy.  The patient was instructed on the use of cricothyroid dominant voice production to thyroarytenoid dominant voice production using increased airflow to increase vocal stability at a pitch range that is acceptable for goals and gender.  The patient used 5-1 glide on /hillary/ from Eb4 to Ab3 with minimal modeling, respiratory, phonatory, somatosensory, and verbal cues at the word and phrase level with approximately 75% accuracy.  She noted increased cognitive effort.  She noted that her voice was not as smooth as she would like; however, with increased repetitions, she felt a smoother transition from \"falsetto\" to \"chest voice.\"     Counseling and Education:  Asked many questions about the nature of her symptoms, and I answered all of these thoroughly.  A revised regimen for home practice was instructed.  I provided an AVS of today's therapeutic activities to facilitate practice.  Patient recorded therapeutic activities to facilitate practice.    ASSESSMENT/PLAN  PROGRESS TOWARD LONG TERM GOALS:   Adequate progress; too early for objective measures    IMPRESSIONS: Laryngeal Hyperfunction (J38.7), Dysphonia (R49.0), and Voice and Resonance Disorder " (R49.9) in the context of Gender Dysphoria (F64.0).  Ms. Rangel demonstrated good learning of therapeutic activities to optimize her authentic voice quality. We focused on therapeutic tasks that target cricothyroid dominant voice production to strengthen balance and coordination of the thyroarytenoid and cricothyroid muscles, and emphasized that this type of voice production is an exercise and not an end-goal.  She will continue to work on these as she is able in conjunction with her previous therapeutic exercise regimen.    PLAN: I will see Ms. Rangel in 3 weeks, at which point we will continue therapy.   For practice goals see AVS.     Next Clinic Appt: 10/16/2024    TOTAL SERVICE TIME:   Call Initiated at: 08:04  Call Ended at: 08:58           CPT Billing Codes:   TREATMENT OF SPEECH, LANGUAGE, VOICE, COMMUNICATION, and/or AUDITORY PROCESSING DISORDER (12368)    Chester Valdes M.M., Ph.D., CF-SLP  Clinical Fellow in Voice and Upper Airway Speech-Language Pathology  [facility]  [email or web address]  Pronouns: he/him    ---I attest that the services performed were provided by a clinical fellow with my direct supervision. ---    TAWANA Diaz.GORAN, M.M. (voice), [facility]-SLP  Speech-Language Pathologist - [facility] Supervisor  [place] Trained Vocologist  [facility]  [email or web address]  Pronouns: she/her      *this report was created in part through the use of computerized dictation software, and though reviewed following completion, some typographic errors may persist.  If there is confusion regarding any of this notes contents, please contact me for clarification               Again, thank you for allowing me to participate in the care of your patient.      Sincerely,    Mayuri Barton, SLP

## 2024-09-25 NOTE — PROGRESS NOTES
"Claire is a 33 year old adult who is being cared for via a billable virtual visit.        The patient/client has been notified and verbally consented to the following statements:   This video visit will be conducted between you and your provider.  If during the course of the call the provider feels a video visit is not appropriate, you will not be charged for this service.    Provider has received verbal consent for billable virtual visit from the patient? Yes    Preferred method for receiving information: [employer]     Call initiated at: 08:04  Platform used to conduct today's virtual appointment: [employer]  Location of provider: Residence  Location of patient: Residence. 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  Laryngeal function studies show significant increase in trans-glottal airflow compared to age and gender matched norms, increased inferred subglottal pressure, and significantly elevated AVQI. Ms. Rangel demonstrated good learning of early therapeutic activities.      SUBJECTIVE:  Since the last appointment, Ms. Rangel reports the following:   Patient noted that her throat started feeling scratchy last night  More throat clearing (TC), which is increased today  Did some gargling and straw with water resistance to mitigate urge to throat clear  She feels like she is making progress, but there are some things that she is not so happy with related to her voice:  Some psychological barriers  Can switch back to older voice habits with familiar people  Hydration: ~25 oz. (\"Not ideal\" per patient report)  Patient described a " "congruent voice as:  1) having a smoother quality  2) sounding clearer with less urge to throat clear  3) brighter   4) a bit higher pitch  Patient described voice-related characteristics she would like to change as:  1) Too much throat clearing  2) Not enough feminine coded speech patterns (prosody, etc.)    OBJECTIVE:  Ms. Rangel presents today with the following:  Voice quality:  Roughness: WNL  Breathiness: WNL  Strain: WNL   Pitch:  F0/Habitual/Conversational speech:    Previous: Around G3, which is an acceptable pitch for goals and gender.  Today: ~B2 at beginning of session, possibly due to URI/Allergy symptoms    PATIENT REPORTED MEASURES:  Patient Supplied Answers To SLP QOL Questionnaire       No data to display              Patient Supplied Answers To VHI Questionnaire      9/21/2024    11:59 AM   Voice Handicap Index (VHI-10)   My voice makes it difficult for people to hear me 1   People have difficulty understanding me in a noisy room 1   My voice difficulties restrict my personal and social life.  2   I feel left out of conversations because of my voice 2   My voice problem causes me to lose income 0   I feel as though I have to strain to produce voice 1   The clarity of my voice is unpredictable 2   My voice problem upsets me 3   My voice makes me feel handicapped 2   People ask, \"What's wrong with your voice?\" 0   VHI-10 14       THERAPEUTIC ACTIVITIES    The patient demonstrated straw phonation with water resistance (descending and variegated pitches) and gargling with [k] phoneme immediately following. TC present immediately after both tasks    All therapeutic tasks completed chromatically unless otherwise noted.     The patient was instructed in the use of liptrills to reduce urge to TC and to improve respiratory-phonatory coordination.  Patient used 5-1 glide on a liptrill from D3 to B4 (C5 was attempted, but discontinued) with minimal verbal, resonatory, registration, and respiratory cues with " "approximately 90% accuracy.  Patient was cued to phonate in cricothyroid dominant voice production above F#4.  The patient was instructed in the use of variegated pitch-vowel patterns in skilled vocal tasks to improve use of cricothyroid dominant voice production and to improve respiratory-phonatory coordination.   The patient used 5-1 glide on /hillary/ from Bb3 to Bb4 to Ab3 with minimal resonatory, verbal, modeling, and pitch (gliding) cues with approximately 90% accuracy.  The patient was instructed on the use of cricothyroid dominant voice production to thyroarytenoid dominant voice production using increased airflow to increase vocal stability at a pitch range that is acceptable for goals and gender.  The patient used 5-1 glide on /hillary/ from Eb4 to Ab3 with minimal modeling, respiratory, phonatory, somatosensory, and verbal cues at the word and phrase level with approximately 75% accuracy.  She noted increased cognitive effort.  She noted that her voice was not as smooth as she would like; however, with increased repetitions, she felt a smoother transition from \"falsetto\" to \"chest voice.\"     Counseling and Education:  Asked many questions about the nature of her symptoms, and I answered all of these thoroughly.  A revised regimen for home practice was instructed.  I provided an AVS of today's therapeutic activities to facilitate practice.  Patient recorded therapeutic activities to facilitate practice.    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  Ms. Rangel demonstrated good learning of therapeutic activities to optimize her authentic voice quality. We focused on therapeutic tasks that target cricothyroid dominant voice production to strengthen balance and coordination of the thyroarytenoid and " cricothyroid muscles, and emphasized that this type of voice production is an exercise and not an end-goal.  She will continue to work on these as she is able in conjunction with her previous therapeutic exercise regimen.    PLAN: I will see Ms. Rnagel in 3 weeks, at which point we will continue therapy.   For practice goals see AVS.     Next Clinic Appt: 10/16/2024    TOTAL SERVICE TIME:   Call Initiated at: 08:04  Call Ended at: 08:58           CPT Billing Codes:   TREATMENT OF SPEECH, LANGUAGE, VOICE, COMMUNICATION, and/or AUDITORY PROCESSING DISORDER (21955)    Chester Valdes M.M., Ph.D., CF-SLP  Clinical Fellow in Voice and Upper Airway Speech-Language Pathology  [facility]  [email or web address]  Pronouns: he/him    ---I attest that the services performed were provided by a clinical fellow with my direct supervision. ---    WIL Diaz, M.M. (voice), [facility]-SLP  Speech-Language Pathologist - [facility] Voice [facility] Supervisor  [facility] Trained Vocologist  [facility]  [email or web address]  Pronouns: she/her      *this report was created in part through the use of computerized dictation software, and though reviewed following completion, some typographic errors may persist.  If there is confusion regarding any of this notes contents, please contact me for clarification

## 2024-09-30 ENCOUNTER — DOCUMENTATION ONLY (OUTPATIENT)
Dept: OTHER | Facility: CLINIC | Age: 34
End: 2024-09-30
Payer: COMMERCIAL

## 2024-10-02 ENCOUNTER — MYC MEDICAL ADVICE (OUTPATIENT)
Dept: FAMILY MEDICINE | Facility: CLINIC | Age: 34
End: 2024-10-02
Payer: COMMERCIAL

## 2024-10-02 DIAGNOSIS — F90.0 ATTENTION DEFICIT HYPERACTIVITY DISORDER (ADHD), PREDOMINANTLY INATTENTIVE TYPE: ICD-10-CM

## 2024-10-02 NOTE — TELEPHONE ENCOUNTER
Pt requesting script be sent to different pharmacy. RN queued up medication to pharmacy requested and sending to provider for review.      Elli Avery RN

## 2024-10-03 RX ORDER — LISDEXAMFETAMINE DIMESYLATE 30 MG/1
30 CAPSULE ORAL DAILY
Qty: 30 CAPSULE | Refills: 0 | Status: SHIPPED | OUTPATIENT
Start: 2024-10-03

## 2024-10-16 ENCOUNTER — VIRTUAL VISIT (OUTPATIENT)
Dept: OTOLARYNGOLOGY | Facility: CLINIC | Age: 34
End: 2024-10-16
Payer: COMMERCIAL

## 2024-10-16 DIAGNOSIS — R49.0 DYSPHONIA: Primary | ICD-10-CM

## 2024-10-16 DIAGNOSIS — F64.0 GENDER DYSPHORIA IN ADULT: ICD-10-CM

## 2024-10-16 DIAGNOSIS — R49.9 VOICE AND RESONANCE DISORDER: ICD-10-CM

## 2024-10-16 PROCEDURE — 92507 TX SP LANG VOICE COMM INDIV: CPT | Mod: GN | Performed by: SPEECH-LANGUAGE PATHOLOGIST

## 2024-10-16 NOTE — LETTER
"10/16/2024       RE: Martha Rangel  [address] [facility] [address]     Dear Colleague,    Thank you for referring your patient, Martha Rangel, to the [facility] VOICE CLINIC [place] at [facility]. Please see a copy of my visit note below.    Claire is a 34 year old adult who is being cared for via a billable virtual visit.        The patient/client has been notified and verbally consented to the following statements:   This video visit will be conducted between you and your provider.  If during the course of the call the provider feels a video visit is not appropriate, you will not be charged for this service.    Provider has received verbal consent for billable virtual visit from the patient? Yes    Preferred method for receiving information: [employer]     Call initiated at: 1:04pm  Platform used to conduct today's virtual appointment: AM Well Video  Location of provider: Residence  Location of patient: Residence. 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  Laryngeal function studies show significant increase in trans-glottal airflow compared to age and gender matched norms, increased inferred subglottal pressure, and significantly elevated AVQI. Ms. Rangel demonstrated good learning of early therapeutic activities.      SUBJECTIVE:  Since the last appointment, Ms. Rangel reports the following:   Overall she reports that symptoms are improving after illness  She was getting sick during the last appointment  Took a few days to recover  Cold/ flu bug  Last week, " "she had a couple days where she put more effort into her voice, and then felt a little soreness, and then backed off a little  Still difficult to do dedicated practice every day.     OBJECTIVE:  Ms. Rangel presents today with the following:  Voice quality:  Roughness: WNL  Breathiness: WNL  Strain: WNL   Pitch:  F0/Habitual/Conversational speech:    Previous: Around G3, which is an acceptable pitch for goals and gender.  Today: ~B2 at beginning of session, possibly due to URI/Allergy symptoms      PATIENT REPORTED MEASURES:  Patient Supplied Answers To SLP QOL Questionnaire       No data to display              Patient Supplied Answers To VHI Questionnaire      10/14/2024    10:52 AM   Voice Handicap Index (VHI-10)   My voice makes it difficult for people to hear me 1    People have difficulty understanding me in a noisy room 1    My voice difficulties restrict my personal and social life.  1    I feel left out of conversations because of my voice 1    My voice problem causes me to lose income 0    I feel as though I have to strain to produce voice 1    The clarity of my voice is unpredictable 2    My voice problem upsets me 3    My voice makes me feel handicapped 2    People ask, \"What's wrong with your voice?\" 0    VHI-10 12       Patient-reported       THERAPEUTIC ACTIVITIES  Demonstrated previous exercises.  demonstrated improved technique  appropriate redirection provided  instruction provided for increased level of complexity/difficulty    Instructed in techniques to improve length of utterance with reduced effort for optimal carryover.  Instructed with words, phrases, and sentences using a variety of emotions; this was helpful to identify   Developed a mental checklist of factors to help trouble shoot moments of difficulty during daily speaking tasks.    Counseling and Education:  Asked many questions about the nature of her symptoms, and I answered all of these thoroughly.  A revised regimen for home practice " was instructed.    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 Ms. Rangel demonstrated good understanding of therapeutic activities to optimize her voice quality.     PLAN: I will see Ms. Rangel in 2 weeks, at which point we will continue therapy.   For practice goals see AVS.     Next Clinic Appt: 11/1/24    TOTAL SERVICE TIME:   Call Initiated at: 1:04 PM  Call Ended at: 2p           CPT Billing Codes:   TREATMENT OF SPEECH, LANGUAGE, VOICE, COMMUNICATION, and/or AUDITORY PROCESSING DISORDER (25450)    TAWANA Diaz.GORAN, M.M., CCC-SLP  Speech-Language Pathologist  [facility] Trained Vocologist  [facility]  [email or web address]  Pronouns: she/her      *this report was created in part through the use of computerized dictation software, and though reviewed following completion, some typographic errors may persist.  If there is confusion regarding any of this notes contents, please contact me for clarification            Again, thank you for allowing me to participate in the care of your patient.      Sincerely,    Mayuri Barton SLP

## 2024-10-16 NOTE — PROGRESS NOTES
"Claire is a 34 year old adult who is being cared for via a billable virtual visit.        The patient/client has been notified and verbally consented to the following statements:   This video visit will be conducted between you and your provider.  If during the course of the call the provider feels a video visit is not appropriate, you will not be charged for this service.    Provider has received verbal consent for billable virtual visit from the patient? Yes    Preferred method for receiving information: [employer]hart     Call initiated at: 1:04pm  Platform used to conduct today's virtual appointment: AM Kyree Video  Location of provider: Residence  Location of patient: Residence. 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  Laryngeal function studies show significant increase in trans-glottal airflow compared to age and gender matched norms, increased inferred subglottal pressure, and significantly elevated AVQI. Ms. Rangel demonstrated good learning of early therapeutic activities.      SUBJECTIVE:  Since the last appointment, Ms. Rangel reports the following:   Overall she reports that symptoms are improving after illness  She was getting sick during the last appointment  Took a few days to recover  Cold/ flu bug  Last week, she had a couple days where she put more effort into her voice, and then felt a little soreness, and then backed off a little  Still difficult to do dedicated practice every day.     OBJECTIVE:  Ms. Rangel presents today with the following:  Voice quality:  Roughness: WNL  Breathiness: WNL  Strain: WNL " "  Pitch:  F0/Habitual/Conversational speech:    Previous: Around G3, which is an acceptable pitch for goals and gender.  Today: ~B2 at beginning of session, possibly due to URI/Allergy symptoms      PATIENT REPORTED MEASURES:  Patient Supplied Answers To SLP QOL Questionnaire       No data to display              Patient Supplied Answers To VHI Questionnaire      10/14/2024    10:52 AM   Voice Handicap Index (VHI-10)   My voice makes it difficult for people to hear me 1    People have difficulty understanding me in a noisy room 1    My voice difficulties restrict my personal and social life.  1    I feel left out of conversations because of my voice 1    My voice problem causes me to lose income 0    I feel as though I have to strain to produce voice 1    The clarity of my voice is unpredictable 2    My voice problem upsets me 3    My voice makes me feel handicapped 2    People ask, \"What's wrong with your voice?\" 0    VHI-10 12       Patient-reported       THERAPEUTIC ACTIVITIES  Demonstrated previous exercises.  demonstrated improved technique  appropriate redirection provided  instruction provided for increased level of complexity/difficulty    Instructed in techniques to improve length of utterance with reduced effort for optimal carryover.  Instructed with words, phrases, and sentences using a variety of emotions; this was helpful to identify   Developed a mental checklist of factors to help trouble shoot moments of difficulty during daily speaking tasks.    Counseling and Education:  Asked many questions about the nature of her symptoms, and I answered all of these thoroughly.  A revised regimen for home practice was instructed.    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 Ms. Rangel demonstrated good understanding " of therapeutic activities to optimize her voice quality.     PLAN: I will see Ms. Rangel in 2 weeks, at which point we will continue therapy.   For practice goals see AVS.     Next Clinic Appt: 11/1/24    TOTAL SERVICE TIME:   Call Initiated at: 1:04 PM  Call Ended at: 2p           CPT Billing Codes:   TREATMENT OF SPEECH, LANGUAGE, VOICE, COMMUNICATION, and/or AUDITORY PROCESSING DISORDER (07658)    WIL Diaz, M.M., CCC-SLP  Speech-Language Pathologist  [facility] Trained Vocologist  [facility] Voice [facility]  [email or web address]  Pronouns: she/her      *this report was created in part through the use of computerized dictation software, and though reviewed following completion, some typographic errors may persist.  If there is confusion regarding any of this notes contents, please contact me for clarification

## 2024-10-16 NOTE — PATIENT INSTRUCTIONS
"After Visit Summary    Patient: Martha Rangel  Date of Visit: 10/16/2024    These notes are also available in your MyChart. Please take a few moments to find them under \"Past Appointments\" in the RE[facility] system.    \"Handouts\" that go along with today's order of activities include (below):     Frequency of practice: 2-3x/day unless marked otherwise    Order of today's appointment:  When adding breathiness, make sure to stretch words and elongate voiceless sounds.            JAKE Diaz., M.M., CCC-SLP  Speech-Language Pathologist - [facility] Voice Clinic Supervisor  [facility] Certified Vocologist  [facility] Voice [facility]  [email or web address]  she/her    "

## 2024-11-01 ENCOUNTER — VIRTUAL VISIT (OUTPATIENT)
Dept: OTOLARYNGOLOGY | Facility: CLINIC | Age: 34
End: 2024-11-01
Payer: COMMERCIAL

## 2024-11-01 DIAGNOSIS — R49.0 DYSPHONIA: Primary | ICD-10-CM

## 2024-11-01 DIAGNOSIS — R49.9 VOICE AND RESONANCE DISORDER: ICD-10-CM

## 2024-11-01 DIAGNOSIS — F64.0 GENDER DYSPHORIA IN ADULT: ICD-10-CM

## 2024-11-01 NOTE — PATIENT INSTRUCTIONS
Massage/Stretching:    Tongue Base Massage               Using the knuckle of your index finger or thumb, begin massaging in small, gentle circles right under the chin.  You can also hold your chin with your index finger and use your thumb.  Work along the sides, middle, and tip of the bottom of the chin with a gentle pressure.    It is important to keep the chin level or down to avoid putting strain on the neck muscles.    Laryngeal Massage   Using the index finger, find the Jon's / Мария's apple.  From there, slide the index finger and thumb along the side of the larynx until you find the thyrohyoid space. These are little pockets on each side of the larynx.  With a gentle pressure, massage in circles around that area.  After a few circles, with your fingers still in the thyrohyoid space, rock your thumb and index finger up and down alternately; much like a see-saw motion.    Next use your thumb and index finger to lift gently and hold this position for a moment, and then gently pull down and hold this position for a moment.  You may also move the main bulk of the larynx side to side.  You may experience a slight  crunchy  (a.k.a. crepitus) quality to this movement.  This should not be a cause for concern, and unless it is causing discomfort, you should be able to continue this massage.  Once things feel loosened up, give a slight squeeze in the thyrohyoid space, and pull down and forward in a V shape towards the front of the trachea.    Sternocleidomastoid Massage  Starting directly below the ears, use your hands to massage in circles. Work your way down the sides of the neck, and into the shoulders. Work back up the side of the neck, and then work down the front of the neck.    You may use slightly more pressure for this massage, but stop if there is pain / throbbing.  Try turning your head to be better able to find these sternocleidomastoid muscles.  Use your hands to pull down along these muscles from below the  "ear to the shoulders, and from below the ear in a V shape towards the front.  Use your fingers to gently massage the  little twiggies  (a.k.a.: sternal origin of the Sternocleidomastoid Muscle that attaches to the medial end of the clavicle/collarbone) one at a time.    Facilitating /sh/ exercises:  Note: Use these if you are having trouble getting back into a \"smooth\" voice.  Otherwise, feel free to skip them!    Level 1-Syllables:    Shoe  She  Show  Sebastian  Guidry (this one might be hard, skip it and go back to Shoe if this one doesn't feel fluid)    Level 2-Words:    Shoe  Shy  Shirt  Short  Shut  Share  Shed    Shine  Sharp    Level 3-Two syllable words:    Shoelace  Shiver    Sharing  Sharpen  Shelter  Shopping  Shampoo  Chevy  Shallow    Level 4-Phrases:    shake hands  sharp thorn  shop for food  shovel snow  shy girl  hair shampoo  nice to share  make shine  hiking shoe  sore shoulder    Level 5-Sentences:    The  made the best pasta.  She bought a cut, color, and shampoo.  She is nice to share her ice cream.  She washes her hair in the shower.  She is shy around new people.  The sheep is standing in the grass.  Be careful the thorn is sharp.  He wore his gray shirt.  I need to shop for groceries.  The doctor needs to give you a shot.    Level 6-Paragraph:    [employer] has some of the best  in the [place]. Alida was one of those . Alida was the  at \"[employer] a very expensive restaurant.    Like many people, she liked to shop, wash her car, and sip lemonade in the shade, but unlike many people, she was an accomplished . She had been a  for over 15 years. Growing up, she loved to cook. She experimented with different combinations of ingredients to see how they would taste.    Many people thought this was childish, but even as a young girl, Alida made food dishes that astonished her friends and family. Her biggest secret was that she only used fresh ingredients. When " "it came to quality, Alida never took shortcuts.    She hand selected every ingredient and paid special attention to how they smelled in the store before she bought them. She was always cautious to avoid discounted ingredients because she knew they wouldn't taste right.    Another one of her secrets was that she had excellent communication and cooperation with her staff. She was only one person, and she needed people who would listen and follow her directions. Alida loved what she did. She was an amazing .    Even though she loved working at \"[employer] she wanted to open her own restaurant some day. She looked forward to the day that she would own her own restaurant.     Conversation using \"smooth\" voice:    Ask someone you trust to help you use some of these vocal skills in the context of a typical conversation.  For example, you might ask your wife to have a conversation with you using a more congruent voice during dinner.  In those conversations, focus on only one vocal characteristic.  For the next few weeks, just focus on using a \"smooth\" voice.    If you have any questions, please don't hesitate to reach out to Mayuri or me!    Ricardo Chance M.M., Ph.D., CF-SLP  Clinical Fellow in Voice and Upper Airway Speech-Language Pathology  [facility] [email or web address] [email or web address]  Pronouns: he/him    "

## 2024-11-01 NOTE — LETTER
"11/1/2024       RE: Martha Rangel  [address] [facility] [place] [address]     Dear Colleague,    Thank you for referring your patient, Martha Rangel, to the [facility] VOICE CLINIC [place] [facility]. Please see a copy of my visit note below.    Claire is a 34 year old adult who is being cared for via a billable virtual visit.        The patient/client has been notified and verbally consented to the following statements:   This video visit will be conducted between you and your provider.  If during the course of the call the provider feels a video visit is not appropriate, you will not be charged for this service.    Provider has received verbal consent for billable virtual visit from the patient? Yes    Preferred method for receiving information: [employer]     Call initiated at: 08:00  Platform used to conduct today's virtual appointment: AM Well Video  Location of provider: Residence  Location of patient: Residence. 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  Laryngeal function studies show significant increase in trans-glottal airflow compared to age and gender matched norms, increased inferred subglottal pressure, and significantly elevated AVQI. Ms. Rangel demonstrated good learning of early therapeutic activities.      SUBJECTIVE:  Since the last appointment, Ms. Ranegl reports the following:   Overall she reports that symptoms are improving after illness  Feels recovered from previous URI symptoms.  Trying to use her voice more consistently throughout the " "day.  Still needs to \"back off\" after about 1-2 days of using a congruent voice  Vocal quality declines and becomes more unstable  Massages have been helpful in the interval since last session, but she doesn't remember all of them.  Patient goal: \"Find something to be able to regain vocal clarity quicker.\"    From 9/25/2024:  Patient described a congruent voice as:  1) having a smoother quality  2) sounding clearer with less urge to throat clear  3) brighter   4) a bit higher pitch  Patient described voice-related characteristics she would like to change as:  1) Too much throat clearing  2) Not enough feminine coded speech patterns    OBJECTIVE:  Ms. Rangel presents today with the following:  Voice quality:  Roughness: WNL  Breathiness: WNL  Strain: WNL   Pitch:  F0/Habitual/Conversational speech:    Previous: Around G3, which is an acceptable pitch for goals and gender.  Today: ~B2 at beginning of session, possibly due to URI/Allergy symptoms      PATIENT REPORTED MEASURES:  Patient Supplied Answers To VHI Questionnaire      10/31/2024    10:21 PM   Voice Handicap Index (VHI-10)   My voice makes it difficult for people to hear me 1    People have difficulty understanding me in a noisy room 1    My voice difficulties restrict my personal and social life.  1    I feel left out of conversations because of my voice 1    My voice problem causes me to lose income 1    I feel as though I have to strain to produce voice 2    The clarity of my voice is unpredictable 3    My voice problem upsets me 3    My voice makes me feel handicapped 2    People ask, \"What's wrong with your voice?\" 0    VHI-10 15        Patient-reported         11/1/2024     8:00 AM   Dysponia SLP Goals   How much effort is it to speak, if 0 is no extra effort and 10 is maximum effort? 4   How well does your voice align with your identity, where 0 is \"my voice doesn't align at all\", and 10 is \"my voice aligns completely\"? 5   How severe is your cough " "/throat clearing, if 0 is no cough at all and 10 is the worst cough? 6   How much does your voice problem bother you? Somewhat       THERAPEUTIC ACTIVITIES  Demonstrated previous exercises.  demonstrated improved technique  appropriate redirection provided  instruction provided for increased level of complexity/difficulty    The patient was instructed in the use of self-facilitated manual therapy probing to identify tenderness/discomfort/pain related to the extrinsic laryngeal musculature.   The patient identified the thyrohyoid space as tender, massaged with her  pointer finger and thumb.  The patient identified the sternocleidomastoid as tight and tender, massaged vertically with her thumb and with \"spider fingers\" vertically with all fingers and thumb.  The patient identified the submandibular sling as tender, massaged with thumb.  The patient identified the geniohyoid area as tender, massaged with her  thumb.    The patient was instructed on the use of flow phonation to improve respiratory-phonatory coordination, to increase airflow during connected speech, and to entrain registration balance in the context of using a congruent voice.  The patient used /sh/ loaded phonemes at the syllable-level with single-syllable words with minimal verbal and modeling cues with approximately 90% accuracy.  The patient used /sh/ loaded phonemes at the word-level with two-syllable words (voiced and voiceless) with minimal verbal, modeling, phonatory, and negative practice cues.  The patient used /sh/ loaded phonemes at the sentence-level with minimal verbal, modeling, and phonatory cues.  The patient observed that they were able to use a \"smooth\" voice more consistently and felt less urge to throat clear.    The patient was instructed on the use of conversational training therapy (CTT) to improve respiratory-phonatory coordination, to improve voice quality, and to increase generalization to conversational speech.  The patient " "used CTT with a focus on flow phonation at the conversational-level with minimal verbal, modeling, respiratory, phonatory, and negative practice cues with approximately 70% accuracy.  The patient observed, \"I think when I slip into the older voice, it's more mental, like I'm not paying enough attention.\"  The patient completed this task for approximately 5 minutes.  The patient identified dinner with her wife as a potential opportunity to use conversations using \"smooth\" voice characteristics.    Counseling and Education:  Asked many questions about the nature of her symptoms, and I answered all of these thoroughly.  A revised regimen for home practice was instructed.    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 Ms. Rangel demonstrated excellent understanding of therapeutic activities to identify and produce a congruent voice.   She noted that she sometimes finds it physically difficult to find and maintain a congruent voice in impromptu conversations in professional settings.  Today's session focused on using /sh/ loaded phonemes in a linguistic hierarchy leading to conversational speech with a focus on flow phonation.  Increased use of flow phonation was facilitating for decreased need to throat clear and decreased vocal fatigue.  Ms. Rangel's prognosis is excellent for decreased muscle tension, decreased vocal strain, and increased vocal congruence.    PLAN: I will see Ms. Rangel in 3 weeks, at which point we will continue therapy. I also introduced the option of scheduling with Dr. Valdes to be sure to have additional therapy appointments prior to 2025.  For practice goals see AVS.     Next Clinic Appt: 11/26/24    TOTAL SERVICE TIME:   Call Initiated at: 8:00  Call Ended at: 8:58           CPT Billing Codes: "   TREATMENT OF SPEECH, LANGUAGE, VOICE, COMMUNICATION, and/or AUDITORY PROCESSING DISORDER (65908)    Chester Valdes M.M., Ph.D., CF-SLP  Clinical Fellow in Voice and Upper Airway Speech-Language Pathology  [facility]  [email or web address]  Pronouns: he/him    ---I attest that the services performed were provided by a clinical fellow with my direct supervision. ---    WIL Diaz, M.M. (voice), [facility]-SLP  Speech-Language Pathologist - [facility] Supervisor  JONATHAN Trained Vocologist  [facility]  [email or web address]  Pronouns: she/her      *this report was created in part through the use of computerized dictation software, and though reviewed following completion, some typographic errors may persist.  If there is confusion regarding any of this notes contents, please contact me for clarification            Again, thank you for allowing me to participate in the care of your patient.      Sincerely,    Mayuri Barton, SLP

## 2024-11-01 NOTE — PROGRESS NOTES
"Claire is a 34 year old adult who is being cared for via a billable virtual visit.        The patient/client has been notified and verbally consented to the following statements:   This video visit will be conducted between you and your provider.  If during the course of the call the provider feels a video visit is not appropriate, you will not be charged for this service.    Provider has received verbal consent for billable virtual visit from the patient? Yes    Preferred method for receiving information: Yazinohart     Call initiated at: 08:00  Platform used to conduct today's virtual appointment: AM Kyree Video  Location of provider: Residence  Location of patient: Residence. 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  Laryngeal function studies show significant increase in trans-glottal airflow compared to age and gender matched norms, increased inferred subglottal pressure, and significantly elevated AVQI. Ms. Rangel demonstrated good learning of early therapeutic activities.      SUBJECTIVE:  Since the last appointment, Ms. Rangel reports the following:   Overall she reports that symptoms are improving after illness  Feels recovered from previous URI symptoms.  Trying to use her voice more consistently throughout the day.  Still needs to \"back off\" after about 1-2 days of using a congruent voice  Vocal quality declines and becomes more unstable  Massages have been helpful in the interval since last session, but she doesn't remember all of them.  Patient goal: \"Find something to be able to regain vocal clarity quicker.\"    From " "9/25/2024:  Patient described a congruent voice as:  1) having a smoother quality  2) sounding clearer with less urge to throat clear  3) brighter   4) a bit higher pitch  Patient described voice-related characteristics she would like to change as:  1) Too much throat clearing  2) Not enough feminine coded speech patterns    OBJECTIVE:  Ms. Rangel presents today with the following:  Voice quality:  Roughness: WNL  Breathiness: WNL  Strain: WNL   Pitch:  F0/Habitual/Conversational speech:    Previous: Around G3, which is an acceptable pitch for goals and gender.  Today: ~B2 at beginning of session, possibly due to URI/Allergy symptoms      PATIENT REPORTED MEASURES:  Patient Supplied Answers To VHI Questionnaire      10/31/2024    10:21 PM   Voice Handicap Index (VHI-10)   My voice makes it difficult for people to hear me 1    People have difficulty understanding me in a noisy room 1    My voice difficulties restrict my personal and social life.  1    I feel left out of conversations because of my voice 1    My voice problem causes me to lose income 1    I feel as though I have to strain to produce voice 2    The clarity of my voice is unpredictable 3    My voice problem upsets me 3    My voice makes me feel handicapped 2    People ask, \"What's wrong with your voice?\" 0    VHI-10 15        Patient-reported         11/1/2024     8:00 AM   Dysponia SLP Goals   How much effort is it to speak, if 0 is no extra effort and 10 is maximum effort? 4   How well does your voice align with your identity, where 0 is \"my voice doesn't align at all\", and 10 is \"my voice aligns completely\"? 5   How severe is your cough /throat clearing, if 0 is no cough at all and 10 is the worst cough? 6   How much does your voice problem bother you? Somewhat       THERAPEUTIC ACTIVITIES  Demonstrated previous exercises.  demonstrated improved technique  appropriate redirection provided  instruction provided for increased level of " "complexity/difficulty    The patient was instructed in the use of self-facilitated manual therapy probing to identify tenderness/discomfort/pain related to the extrinsic laryngeal musculature.   The patient identified the thyrohyoid space as tender, massaged with her  pointer finger and thumb.  The patient identified the sternocleidomastoid as tight and tender, massaged vertically with her thumb and with \"spider fingers\" vertically with all fingers and thumb.  The patient identified the submandibular sling as tender, massaged with thumb.  The patient identified the geniohyoid area as tender, massaged with her  thumb.    The patient was instructed on the use of flow phonation to improve respiratory-phonatory coordination, to increase airflow during connected speech, and to entrain registration balance in the context of using a congruent voice.  The patient used /sh/ loaded phonemes at the syllable-level with single-syllable words with minimal verbal and modeling cues with approximately 90% accuracy.  The patient used /sh/ loaded phonemes at the word-level with two-syllable words (voiced and voiceless) with minimal verbal, modeling, phonatory, and negative practice cues.  The patient used /sh/ loaded phonemes at the sentence-level with minimal verbal, modeling, and phonatory cues.  The patient observed that they were able to use a \"smooth\" voice more consistently and felt less urge to throat clear.    The patient was instructed on the use of conversational training therapy (CTT) to improve respiratory-phonatory coordination, to improve voice quality, and to increase generalization to conversational speech.  The patient used CTT with a focus on flow phonation at the conversational-level with minimal verbal, modeling, respiratory, phonatory, and negative practice cues with approximately 70% accuracy.  The patient observed, \"I think when I slip into the older voice, it's more mental, like I'm not paying enough " "attention.\"  The patient completed this task for approximately 5 minutes.  The patient identified dinner with her wife as a potential opportunity to use conversations using \"smooth\" voice characteristics.    Counseling and Education:  Asked many questions about the nature of her symptoms, and I answered all of these thoroughly.  A revised regimen for home practice was instructed.    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 Ms. Rangel demonstrated excellent understanding of therapeutic activities to identify and produce a congruent voice.   She noted that she sometimes finds it physically difficult to find and maintain a congruent voice in impromptu conversations in professional settings.  Today's session focused on using /sh/ loaded phonemes in a linguistic hierarchy leading to conversational speech with a focus on flow phonation.  Increased use of flow phonation was facilitating for decreased need to throat clear and decreased vocal fatigue.  Ms. Rangel's prognosis is excellent for decreased muscle tension, decreased vocal strain, and increased vocal congruence.    PLAN: I will see Ms. Rangel in 3 weeks, at which point we will continue therapy. I also introduced the option of scheduling with Dr. Valdes to be sure to have additional therapy appointments prior to 2025.  For practice goals see AVS.     Next Clinic Appt: 11/26/24    TOTAL SERVICE TIME:   Call Initiated at: 8:00  Call Ended at: 8:58           CPT Billing Codes:   TREATMENT OF SPEECH, LANGUAGE, VOICE, COMMUNICATION, and/or AUDITORY PROCESSING DISORDER (57691)    Chester Valdes M.M., Ph.D., CF-SLP  Clinical Fellow in Voice and Upper Airway Speech-Language Pathology  [facility]  [email or web address]  Pronouns: " he/him    ---I attest that the services performed were provided by a clinical fellow with my direct supervision. ---    [email or web address] (voice), [facility]-SLP  Speech-Language Pathologist - [facility] Supervisor  JONATHAN Trained Vocologist  [facility]  [email or web address]  Pronouns: she/her      *this report was created in part through the use of computerized dictation software, and though reviewed following completion, some typographic errors may persist.  If there is confusion regarding any of this notes contents, please contact me for clarification

## 2024-11-11 ENCOUNTER — MYC REFILL (OUTPATIENT)
Dept: FAMILY MEDICINE | Facility: CLINIC | Age: 34
End: 2024-11-11
Payer: COMMERCIAL

## 2024-11-11 ENCOUNTER — MYC MEDICAL ADVICE (OUTPATIENT)
Dept: FAMILY MEDICINE | Facility: CLINIC | Age: 34
End: 2024-11-11
Payer: COMMERCIAL

## 2024-11-11 DIAGNOSIS — N52.2 DRUG-INDUCED ERECTILE DYSFUNCTION: ICD-10-CM

## 2024-11-11 DIAGNOSIS — F90.0 ATTENTION DEFICIT HYPERACTIVITY DISORDER (ADHD), PREDOMINANTLY INATTENTIVE TYPE: ICD-10-CM

## 2024-11-12 RX ORDER — LISDEXAMFETAMINE DIMESYLATE 30 MG/1
30 CAPSULE ORAL DAILY
Qty: 30 CAPSULE | Refills: 0 | Status: CANCELLED | OUTPATIENT
Start: 2024-12-01

## 2024-11-12 RX ORDER — LISDEXAMFETAMINE DIMESYLATE 30 MG/1
30 CAPSULE ORAL DAILY
Qty: 30 CAPSULE | Refills: 0 | Status: CANCELLED | OUTPATIENT
Start: 2024-11-12

## 2024-11-12 NOTE — TELEPHONE ENCOUNTER
Spoke with patient, they want the Vyvanse to be sent to [facility] in [place]. I have make that the pharmacy of choice. Patient states has not taken med since 10/16/24.    Did not want to go to [facility] to get the script that is already there.    Requested the November and December are resent to [facility]    Estefanía Gomez RN

## 2024-11-14 RX ORDER — LISDEXAMFETAMINE DIMESYLATE 30 MG/1
30 CAPSULE ORAL DAILY
Qty: 30 CAPSULE | Refills: 0 | Status: SHIPPED | OUTPATIENT
Start: 2024-12-14 | End: 2025-01-13

## 2024-11-14 RX ORDER — LISDEXAMFETAMINE DIMESYLATE 30 MG/1
30 CAPSULE ORAL DAILY
Qty: 30 CAPSULE | Refills: 0 | Status: SHIPPED | OUTPATIENT
Start: 2024-11-14 | End: 2024-12-14

## 2024-11-14 RX ORDER — TADALAFIL 2.5 MG/1
2.5 TABLET ORAL DAILY
Qty: 90 TABLET | Refills: 3 | Status: SHIPPED | OUTPATIENT
Start: 2024-11-14

## 2024-11-14 RX ORDER — LISDEXAMFETAMINE DIMESYLATE 30 MG/1
30 CAPSULE ORAL DAILY
Qty: 30 CAPSULE | Refills: 0 | Status: SHIPPED | OUTPATIENT
Start: 2025-01-13 | End: 2025-02-12

## 2024-11-18 ENCOUNTER — TELEPHONE (OUTPATIENT)
Dept: FAMILY MEDICINE | Facility: CLINIC | Age: 34
End: 2024-11-18
Payer: COMMERCIAL

## 2024-11-18 NOTE — LETTER
November 21, 2024    Martha Rangel  [address] [facility] [address]    PRIOR AUTHORIZATION DENIAL APPEAL LETTER    Medication: TADALAFIL 2.5 MG PO TABS  Insurance Company: [employer] - Phone [phone] Fax [phone]  Denial Date: 11/18/2024  Denial Reason(s): Has not tried generic sildenafil    Dear Whom It May Concern:  Regarding Ms. Martha Rangel has been my patient since December 2022. She has a diagnosis of drug-induced erectile dysfunction and was trailed on sildenafil from 5/4/23-7/25/23. Due to issues around timing of this PRN medication, it was determined that her condition could not be optimal treated with this regimen and we switched to daily tadalafil 2.5mg which has completely treated her symptoms and is working quite well.           If you have any questions, please do not hesitate to contact me, or our Team Nurse, at clinic.     I thank you in advance for your help.         Respectfully,       April Vanessa MD

## 2024-11-18 NOTE — TELEPHONE ENCOUNTER
PRIOR AUTHORIZATION DENIED    Medication: TADALAFIL 2.5 MG PO TABS  Insurance Company: [employer] - Phone [phone] Fax [phone]  Denial Date: 11/18/2024  Denial Reason(s): Criteria not met      Appeal Information:     Patient Notified: No

## 2024-11-26 ENCOUNTER — OFFICE VISIT (OUTPATIENT)
Dept: OTOLARYNGOLOGY | Facility: CLINIC | Age: 34
End: 2024-11-26
Payer: COMMERCIAL

## 2024-11-26 DIAGNOSIS — R49.9 VOICE AND RESONANCE DISORDER: ICD-10-CM

## 2024-11-26 DIAGNOSIS — R49.0 DYSPHONIA: Primary | ICD-10-CM

## 2024-11-26 DIAGNOSIS — F64.0 GENDER DYSPHORIA IN ADULT: ICD-10-CM

## 2024-11-26 PROCEDURE — 92507 TX SP LANG VOICE COMM INDIV: CPT | Mod: GN | Performed by: SPEECH-LANGUAGE PATHOLOGIST

## 2024-11-26 NOTE — PROGRESS NOTES
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  Laryngeal function studies show significant increase in trans-glottal airflow compared to age and gender matched norms, increased inferred subglottal pressure, and significantly elevated AVQI. Ms. Rangel demonstrated good learning of early therapeutic activities.        SUBJECTIVE:  Since her last session, Ms. Rangel reports the following:   Overall she reports that symptoms are stable  Still a high amount of mental effort to get where she wants it to be.  Hard to keep it in an acceptable zone. To reduce the effort (mental effort)    OBJECTIVE:  Ms. Rangel presents today with the following:  Voice quality:  Roughness: WNL  Breathiness: WNL  Strain: WNL   Pitch:  F0/Habitual/Conversational speech:    Previous: Around G3, which is an acceptable pitch for goals and gender.  Today: ~B2 at beginning of session, possibly due to URI/Allergy symptoms      PATIENT REPORTED MEASURES:  Patient Supplied Answers To SLP QOL Questionnaire       No data to display              Patient Supplied Answers To VHI Questionnaire      12/16/2024     1:07 PM   Voice Handicap Index (VHI-10)   My voice makes it difficult for people to hear me 1   People have difficulty understanding me in a noisy room 1   My voice difficulties restrict my personal and social life.  1   I feel left out of conversations because of my voice 2   My voice problem causes me to lose income 0   I feel as though I have to strain to produce voice 2   The clarity of my voice is unpredictable 3   My voice problem upsets me 3   My voice makes " "me feel handicapped 2   People ask, \"What's wrong with your voice?\" 0   VHI-10 15        Patient-reported       THERAPEUTIC ACTIVITIES  Demonstrated previous exercises.  demonstrated improved technique  appropriate redirection provided  instruction provided for increased level of complexity/difficulty    Exercises and techniques for optimal vocal hygiene including:  Systemic hydration, including strategies for increasing daily water intake  Topical hydration - Gargling (saline and plain water), saline nasal irrigation, humidification, steam, guaifenesin to reduce the thickened secretions / laryngeal irritation.    Exercises in techniques for improved airflow during phonation  Speech material with /ju/ glides was facilitating at the word level.  Progressed to easy onset/ flow, and blending phrases  Instructed with a descending 5th, as well as an arpeggio pattern; this was helpful.  [place] techniques to reduce glottal ramirez and improve breath flow; negative practice improved awareness today.    Instructed in techniques to improve length of utterance with reduced effort for optimal carryover.  Instructed at the dialogue level   Also instructed to practice techniques learned for practice of her laugh, cough, yawn, and sneeze  Developed a mental checklist of factors to help trouble shoot moments of difficulty during daily speaking tasks.    Counseling and Education:  Asked many questions about the nature of her symptoms, and I answered all of these thoroughly.  A revised regimen for home practice was instructed.  I provided an AVS of today's therapeutic activities to facilitate practice.    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 Ms. Rangel demonstrated good learning of therapeutic activities to optimize her " voice quality.    PLAN: Ms. Rangel will return in 2 weeks, and will continue therapy with Dr. Chester Valdes.   For practice goals see AVS.     Next Clinic Appt: 12/18    TOTAL SERVICE TIME: 60 minutes  TREATMENT (81665): 60 minutes  NO CHARGE FACILITY FEE (61472)    Mayuri Barton M.M. (voice) MBÁRBARA., CCC/SLP  Speech-Language Pathologist  Certificate of Vocology  [facility]  [phone]  [email or web address]  Pronouns: she/her

## 2024-11-26 NOTE — LETTER
"11/26/2024       RE: Martha Rangel  [address] [facility] [address]     Dear Colleague,    Thank you for referring your patient, Martha Rangel, to the [facility] VOICE CLINIC [place] at [facility]. Please see a copy of my visit note below.    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  Laryngeal function studies show significant increase in trans-glottal airflow compared to age and gender matched norms, increased inferred subglottal pressure, and significantly elevated AVQI. Ms. Rangel demonstrated good learning of early therapeutic activities.        SUBJECTIVE:  Since her last session, Ms. Rangel reports the following:   Overall she reports that symptoms are stable  Still a high amount of mental effort to get where she wants it to be.  Hard to keep it in an acceptable zone. To reduce the effort (mental effort)    OBJECTIVE:  Ms. Rangel presents today with the following:  Voice quality:  Roughness: WNL  Breathiness: WNL  Strain: WNL   Pitch:  F0/Habitual/Conversational speech:    Previous: Around G3, which is an acceptable pitch for goals and gender.  Today: ~B2 at beginning of session, possibly due to URI/Allergy symptoms      PATIENT REPORTED MEASURES:  Patient Supplied Answers To SLP QOL Questionnaire       No data to display              Patient Supplied Answers To VHI Questionnaire      12/16/2024     1:07 PM   Voice Handicap Index (VHI-10)   My voice makes it difficult for people to hear me 1   People have difficulty understanding me in a noisy room 1   My " "voice difficulties restrict my personal and social life.  1   I feel left out of conversations because of my voice 2   My voice problem causes me to lose income 0   I feel as though I have to strain to produce voice 2   The clarity of my voice is unpredictable 3   My voice problem upsets me 3   My voice makes me feel handicapped 2   People ask, \"What's wrong with your voice?\" 0   VHI-10 15        Patient-reported       THERAPEUTIC ACTIVITIES  Demonstrated previous exercises.  demonstrated improved technique  appropriate redirection provided  instruction provided for increased level of complexity/difficulty    Exercises and techniques for optimal vocal hygiene including:  Systemic hydration, including strategies for increasing daily water intake  Topical hydration - Gargling (saline and plain water), saline nasal irrigation, humidification, steam, guaifenesin to reduce the thickened secretions / laryngeal irritation.    Exercises in techniques for improved airflow during phonation  Speech material with /ju/ glides was facilitating at the word level.  Progressed to easy onset/ flow, and blending phrases  Instructed with a descending 5th, as well as an arpeggio pattern; this was helpful.  [place] techniques to reduce glottal ramirez and improve breath flow; negative practice improved awareness today.    Instructed in techniques to improve length of utterance with reduced effort for optimal carryover.  Instructed at the dialogue level   Also instructed to practice techniques learned for practice of her laugh, cough, yawn, and sneeze  Developed a mental checklist of factors to help trouble shoot moments of difficulty during daily speaking tasks.    Counseling and Education:  Asked many questions about the nature of her symptoms, and I answered all of these thoroughly.  A revised regimen for home practice was instructed.  I provided an AVS of today's therapeutic activities to facilitate practice.    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 Ms. Rangel demonstrated good learning of therapeutic activities to optimize her voice quality.    PLAN: Ms. Rangel will return in 2 weeks, and will continue therapy with Dr. Chester Valdes.   For practice goals see AVS.     Next Clinic Appt: 12/18    TOTAL SERVICE TIME: 60 minutes  TREATMENT (98080): 60 minutes  NO CHARGE FACILITY FEE (12960)    Mayuri Barton M.M. (voice), MTammiA., CCC/SLP  Speech-Language Pathologist  Certificate of Vocology  [facility]  [phone]  [email or web address]  Pronouns: she/her      Again, thank you for allowing me to participate in the care of your patient.      Sincerely,    Mayuri Barton, SLP

## 2024-11-26 NOTE — PATIENT INSTRUCTIONS
"After Visit Summary    Patient: Martha Rangel  Date of Visit: 2024    These notes are also available in your MyChart. Please take a few moments to find them under \"Past Appointments\" in the [facility] system.    \"Handouts\" that go along with today's order of activities include (below):     Frequency of practice: 2-3x/day unless marked otherwise    Order of today's appointment:    Mucus feeling:  Try Mucinex (Guaifenesin)             Papo Bui Jr., DEIDRA Walsh M.D.     Payton Wise M.D.       Shayna Mina, Ph.D.  Mayuri Barton M.M., M.A.                 Rock Eastman M.M., M.A.           ALIZA Lara.M. M.S.         [phone]        Mayuri Barton    Tips to Improve Topical Hydration and Reduce Laryngeal Irritation    Nasal Irrigation:  morning and night  Saline nasal spray __2-3x_________ per day  Saline gel      Gargle:  Using Saline    morning and night     times a day   With plain water (room temp or warm)  Throughout the day (2-3x/day, radha. after meals, or when having increased throat irritation symptoms).  To help reduce throat irritation, the feeling of mucus in the throat, improve topical hydration and to reduce throat clearing, coughing.      Saline Recipe: For garglin-8 oz glass   warm water   tap or distilled (your preference)  feel for right temperature (not too hot or cold)  warm enough to dissolve the salt  1/4 tsp. kosher salt, or sea salt   Additives in table salt can cause irritation/ discomfort.  1/4 tsp. baking soda  (OR alternate salt and soda with one saline packet) - Neilmed is a common brand found in the sinus aisle.        Nasal Dryness:  For light congestion, post-nasal drainage, dryness in the nasal passages, try a saline nasal spray (very cheap - any brand).  Saline nasal gel, like Ayr or NasoGEL are also very helpful. Chilcoot can be found at [employer], but there are many similar products to be found in the allergy/nasal aisle of any " drug or retail store.  Saline gels also help with colds/flu, allergies, winter dryness, low humidity, CPAP/BiPaP, chronic Sinusitis, flying, nosebleeds (a.k.a.: epistaxis), and oxygen therapy.    Lozenges:                     Non-Menthol drops are recommended since menthol is        drying, which includes (regular or sugar-free):          Ludens, Yris Breezers, [employer] Brothers, Life Savers, [place]                                           Ranchers.  Most Ricola drops have menthol (check the                                          package before you buy). Everything in moderation -        maintain good oral hygiene and avoid cavities.  [place] (xlear.com) and other xylitol products are also recommended.    Humidifiers:   I recommend Corbett or Roblero, which tend to be quieter than other brands.  Use especially while sleeping, for improved nasal/ oral topical hydration, and improved sleep.  Please consider purchasing one at the end of the season (March/April), if you cannot afford one now.      Consider the humidifier for use especially at night time when there is minimal systemic hydration.  Fill with enough water for 1-2 nights; clean base and water container at least once per week with a water/vinegar mixture.  Always follow instructions as directed by .        Steam:   Facial steamers/ warm, moist washcloth __1-2____x/day for ___1-2_____minutes.      1-3-1 buzzy V + yawn and swallow (recorded!)    For throat clearing: Descending hum + swallow    Phrases for Blending  Fall_over_the_chair                      Go_(w)into the store  Leave_on_the_lights                     The(e)_(y)only one  See_it_over_there                         The(e)_(y)other day  Do_it_now    Not_even her mom  Put(d)_on_your_shoes         Not_any more  Down_under_the_stairs  Cold_as ice  Not_old_enough   the ice is cold  Look_at_the_sky   he's (z)ill with the flu  The_end_of_the_story  yes_and no  High_up_on_the_shelf  one at a  time            Fake play with these non verbal sounds:  Laugh  Cough  Yawn  sneeze      TAWANA Diaz.UCHE ZIMMER., CCC-SLP  Speech-Language Pathologist - [facility] Supervisor  formerly [facility] Certified Vocologist  [facility]  [email or web address]  she/her

## 2024-12-18 ENCOUNTER — VIRTUAL VISIT (OUTPATIENT)
Dept: OTOLARYNGOLOGY | Facility: CLINIC | Age: 34
End: 2024-12-18
Payer: COMMERCIAL

## 2024-12-18 DIAGNOSIS — R49.0 DYSPHONIA: Primary | ICD-10-CM

## 2024-12-18 DIAGNOSIS — F64.0 GENDER DYSPHORIA IN ADULT: ICD-10-CM

## 2024-12-18 DIAGNOSIS — R49.9 VOICE AND RESONANCE DISORDER: ICD-10-CM

## 2024-12-18 NOTE — LETTER
12/18/2024       RE: Martha Rangel  [address] [facility] [address]     Dear Colleague,    Thank you for referring your patient, Martha Rangel, to the [facility] [place] at [facility]. Please see a copy of my visit note below.    Martha Rangel is a 34 year old adult who is being evaluated via a billable video visit.      Martha has been notified and verbally consented to the following:   This video visit will be conducted between you and your provider.  Patient has opted to conduct today's video visit vs an in-person appointment.   Video visits are billed at different rates depending on your insurance coverage. Please reach out to your insurance provider with any questions.   If during the course of the call the provider feels the appointment is not appropriate, you will not be charged for this service.  Provider has received verbal consent for billable virtual visit from the patient? Yes  Will anyone else be joining your video visit? 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  Laryngeal function studies show significant increase in trans-glottal airflow compared to age and gender matched norms, increased inferred subglottal pressure, and significantly elevated AVQI. Ms. Rangel demonstrated good learning of early therapeutic activities.       SUBJECTIVE:  Since  "Martha's last session, she reports the following:   She states that her voice has felt rough over the last few weeks.  She thinks that the holiday season and also dryness has limited her ability to work on her voice.  She feels like she has a high amount of vocal congruence when she is able to devote a large amount of cognitive effort toward her voice.  She is having difficulty using a congruent voice with consistency.  When she uses character voices, it can be difficult to return to a congruent voice.  Primary patient goal: Working on \"character voices\" with a congruent voice    OBJECTIVE:  Ms. Rangel presents today with the following:  Voice Quality:  Speaking voice:  Roughness: WNL  Breathiness: WNL  Strain: WNL  Pitch: WNL  Loudness: WNL  F0/ Habitual pitch: B2-F3  Throat Issues:  Cough:  None reported today  Throat Clearing: None reported today.  Globus Sensation: None reported today.  Throat Pain/Discomfort: None reported today.    Patient Specific Goal Metrics:      11/1/2024     8:00 AM 12/18/2024     9:00 AM   Dysponia SLP Goals   How much effort is it to speak, if 0 is no extra effort and 10 is maximum effort? 4 2   How well does your voice align with your identity, where 0 is \"my voice doesn't align at all\", and 10 is \"my voice aligns completely\"? 5 4   How severe is your cough /throat clearing, if 0 is no cough at all and 10 is the worst cough? 6 7   How much does your voice problem bother you? Somewhat Quite a bit     [place] Gender Presentation Scale for Communication (U-GPS)    The U-GPS (Gwyn Aguero & Willy, 2024) is a validated measure of voice-related gender incongruence.  It provides an opportunity for clients to rate their own voice and vocal targets based on 10 parameters and on a 9-point gender spectrum continuum.  It allows clients an opportunity to express which areas are important to them and to identify their own personal set of vocal goals.  For a trans female individual, a meaningful " "change in incongruence score is 9.35 (0.5 x SD).    Completed 12/18/2024    Characteristic/Quality Current Goal   Pitch 8 8   Intonation 4 8   Resonance 6 9   Loudness 6 7   Speech Smoothness 4 8   Speech Clarity 7 7   Word Choice 5 7   Facial Expression 5 7   Gesture 6 7   Posture 6 7     Current Score   (Sum of Current Ratings) 51   Goal Score  (Sum of Goal Ratings) 75     Incongruence Score  (Sum of Differences) 16     Note: Feels like they have improved toward their goals with speech smoothness, but speech smoothness and intonation are the most important characteristics to change.      THERAPEUTIC ACTIVITIES  Today Martha participated in the following therapeutic activities:  Counseling and Education:  Asked  questions about the nature of her symptoms, and I answered all of these thoroughly.  We completed the U-GPS to facilitate this new course of gender affirming voice care.    I provided Martha with explanation and skilled instruction of:    The client was instructed in using characteristics related to a congruent voice in the context of using \"character voices\" at the conversational level to improve respiratory-phonatory coordination and to increase vocal congruence in activities of daily life.  The client used \"the dog\" voice at the sentence-level with moderate verbal, negative practice, and modeling cues.    The patient was instructed on the use of conversational training therapy (CTT) to improve respiratory-phonatory coordination, to improve vocal congruence, and to increase generalization to conversational speech.  The patient used CTT with a focus on speech smoothness, proprioceptive awareness, and anterior resonance at the conversational-level with minimal verbal cues.  The patient remarked that they were able to speak in a congruent voice with a fairly high degree of accuracy during this exercise.  The patient completed this task for approximately 5 minutes.    Instruction of Home Practice:  A " "revised home exercise plan (HEP) was collaboratively designed and instructed.  The patient confirmed that the HEP was reasonable and that it seemed attainable for daily practice.  I provided an AVS of important notes of today's therapeutic activities to facilitate practice.    ASSESSMENT/PLAN  Progress toward long-term goals:  Adequate but incomplete progress; please see above    Impressions:  IMPRESSIONS:    Ms. 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 Martha had a productive session of therapy today, working on collaboratively developing new therapeutic goals, increasing vocal congruence when using \"character voices,\" and generalizing a congruent voice.  She also completed the U-GPS today, which indicated speech smoothness, intonation, and resonance as voice characteristics that result in increased vocal incongruence. She will continue to work on her exercises on a daily basis, and work on incorporating the techniques into her daily activities. Speech therapy continues to be medically necessary for Matrha to participate fully and engage in activities of daily living.     Plan:   I will see Martha in 2 weeks (1/2/25) and will plan to increase the use of CTT in the context of \"character voices\" and to focus on exercises that entrain speech smoothness, intonation, and resonance.     For home practice goals, see AVS.     TOTAL SERVICE TIME:   Call Initiated at: 0901  Call Ended at: [phone]  TREATMENT (04127)    CPT Billing Codes:   TREATMENT OF SPEECH, LANGUAGE, VOICE, COMMUNICATION, and/or AUDITORY PROCESSING DISORDER (05920)    Thank you for allowing me to participate in this patient's care,    Chester Valdes M.M., Ph.D., CF-SLP  Clinical Fellow in Voice and Upper Airway Speech-Language Pathology  [facility] [email or web address] [email or web address]  Pronouns: he/him    OBJECTIVE " "FINDINGS  PATIENT REPORTED MEASURES  Patient Supplied Answers To Last 2 VHI Questionnaires      11/25/2024    10:31 AM 12/16/2024     1:07 PM   Voice Handicap Index (VHI-10)   My voice makes it difficult for people to hear me 1  1    People have difficulty understanding me in a noisy room 1  1    My voice difficulties restrict my personal and social life.  2  1    I feel left out of conversations because of my voice 2  2    My voice problem causes me to lose income 0  0    I feel as though I have to strain to produce voice 1  2    The clarity of my voice is unpredictable 2  3    My voice problem upsets me 3  3    My voice makes me feel handicapped 2  2    People ask, \"What's wrong with your voice?\" 0  0    VHI-10 14  15        Patient-reported          Patient Supplied Answers To Last 2 CSI Questionnaires      11/25/2024    10:31 AM 12/16/2024     1:07 PM   Cough Severity Index (CSI)   My cough is worse when I lie down 0  0    My coughing problem causes me to restrict my personal and social life 0  0    I tend to avoid places because of my cough problem 0  0    I feel embarrassed because of my coughing problem 1  1    People ask, ''What's wrong?'' because I cough a lot 0  0    I run out of air when I cough 0  0    My coughing problem affects my voice 3  2    My coughing problem limits my physical activity 1  0    My coughing problem upsets me 2  1    People ask me if I am sick because I cough a lot 1  0    CSI Score 8  4        Patient-reported          Patient Supplied Answers To Last 2 EAT Questionnaires       No data to display                  Patient Supplied Answers to Dyspnea Index Questionnaire:       No data to display                   Attestation signed by Rock Eastman, SLP at 12/24/2024  8:08 AM:  I attest that these services performed under my supervision, and I agree with the information contained in within this note.    Rock Eastman M.M., M.A., CCC-SLP  Speech-Language " Pathologist  Certificate of Vocology  [phone]      Again, thank you for allowing me to participate in the care of your patient.      Sincerely,    Chester Valdes, SLP

## 2024-12-18 NOTE — PATIENT INSTRUCTIONS
"It was cely working with you today!  Here are a few takeaways from our session today:    1) For our next appointment, write down 5 (or more) \"character voices\" that you use in conversations.  Identify one primary vocal characteristic that is salient for that character.    2) Focus on using sensations to cue your congruent voice.  For example, what does speech smoothness feel like?  What does forward/back feel like?    If you have any questions, please don't hesitate to reach out to me via Truziphart or email!    Ricardo Chance M.M., Ph.D., CF-SLP  Clinical Fellow in Voice and Upper Airway Speech-Language Pathology  [email or web address] [email or web address]  Pronouns: he/him    "

## 2024-12-18 NOTE — PROGRESS NOTES
Martha Rangel is a 34 year old adult who is being evaluated via a billable video visit.      Martha has been notified and verbally consented to the following:   This video visit will be conducted between you and your provider.  Patient has opted to conduct today's video visit vs an in-person appointment.   Video visits are billed at different rates depending on your insurance coverage. Please reach out to your insurance provider with any questions.   If during the course of the call the provider feels the appointment is not appropriate, you will not be charged for this service.  Provider has received verbal consent for billable virtual visit from the patient? Yes  Will anyone else be joining your video visit? No    Call initiated at: 0901   Type of Visit Platform Used: [employer] Video  Location of provider: Home  Location of patient: [facility]  PROGRESS REPORT (CPT 33908)    Patient: Martha Rangel  Date of Service: 12/18/2024  Date of Last Service: 11/26/2024  Number of Visits: 9 total / 9 therapy  Primary Cvg: [facility] Core  Referring Physician: April Vanessa     Impressions from evaluation on 12/14/2023 by Mayuri Barton, [email or web address].[email or web address], M.MTammi (voice), M.A., CCC-SLP:  IMPRESSIONS:  Laryngeal Hyperfunction (J38.7), Dysphonia (R49.0), and 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  Laryngeal function studies show significant increase in trans-glottal airflow compared to age and gender matched norms, increased inferred subglottal pressure, and significantly elevated AVQI. Ms. Rangel demonstrated good learning of early therapeutic activities.       SUBJECTIVE:  Since Martha's last session, she reports the following:   She states that her voice has felt rough over the last few weeks.  She thinks that the holiday season and also dryness has limited her ability to work on her voice.  She feels like she has a high amount of vocal congruence when she is able to devote a large  "amount of cognitive effort toward her voice.  She is having difficulty using a congruent voice with consistency.  When she uses character voices, it can be difficult to return to a congruent voice.  Primary patient goal: Working on \"character voices\" with a congruent voice    OBJECTIVE:  Ms. Rangel presents today with the following:  Voice Quality:  Speaking voice:  Roughness: WNL  Breathiness: WNL  Strain: WNL  Pitch: WNL  Loudness: WNL  F0/ Habitual pitch: B2-F3  Throat Issues:  Cough:  None reported today  Throat Clearing: None reported today.  Globus Sensation: None reported today.  Throat Pain/Discomfort: None reported today.    Patient Specific Goal Metrics:      11/1/2024     8:00 AM 12/18/2024     9:00 AM   Dysponia SLP Goals   How much effort is it to speak, if 0 is no extra effort and 10 is maximum effort? 4 2   How well does your voice align with your identity, where 0 is \"my voice doesn't align at all\", and 10 is \"my voice aligns completely\"? 5 4   How severe is your cough /throat clearing, if 0 is no cough at all and 10 is the worst cough? 6 7   How much does your voice problem bother you? Somewhat Quite a bit     [place] Gender Presentation Scale for Communication (U-GPS)    The U-GPS (Gwyn Aguero, & Willy, 2024) is a validated measure of voice-related gender incongruence.  It provides an opportunity for clients to rate their own voice and vocal targets based on 10 parameters and on a 9-point gender spectrum continuum.  It allows clients an opportunity to express which areas are important to them and to identify their own personal set of vocal goals.  For a trans female individual, a meaningful change in incongruence score is 9.35 (0.5 x SD).    Completed 12/18/2024    Characteristic/Quality Current Goal   Pitch 8 8   Intonation 4 8   Resonance 6 9   Loudness 6 7   Speech Smoothness 4 8   Speech Clarity 7 7   Word Choice 5 7   Facial Expression 5 7   Gesture 6 7   Posture 6 7     Current Score   (Sum of " "Current Ratings) 51   Goal Score  (Sum of Goal Ratings) 75     Incongruence Score  (Sum of Differences) 16     Note: Feels like they have improved toward their goals with speech smoothness, but speech smoothness and intonation are the most important characteristics to change.      THERAPEUTIC ACTIVITIES  Today Martha participated in the following therapeutic activities:  Counseling and Education:  Asked  questions about the nature of her symptoms, and I answered all of these thoroughly.  We completed the U-GPS to facilitate this new course of gender affirming voice care.    I provided Martha with explanation and skilled instruction of:    The client was instructed in using characteristics related to a congruent voice in the context of using \"character voices\" at the conversational level to improve respiratory-phonatory coordination and to increase vocal congruence in activities of daily life.  The client used \"the dog\" voice at the sentence-level with moderate verbal, negative practice, and modeling cues.    The patient was instructed on the use of conversational training therapy (CTT) to improve respiratory-phonatory coordination, to improve vocal congruence, and to increase generalization to conversational speech.  The patient used CTT with a focus on speech smoothness, proprioceptive awareness, and anterior resonance at the conversational-level with minimal verbal cues.  The patient remarked that they were able to speak in a congruent voice with a fairly high degree of accuracy during this exercise.  The patient completed this task for approximately 5 minutes.    Instruction of Home Practice:  A revised home exercise plan (HEP) was collaboratively designed and instructed.  The patient confirmed that the HEP was reasonable and that it seemed attainable for daily practice.  I provided an AVS of important notes of today's therapeutic activities to facilitate practice.    ASSESSMENT/PLAN  Progress toward long-term " "goals:  Adequate but incomplete progress; please see above    Impressions:  IMPRESSIONS:    Ms. 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 Martha had a productive session of therapy today, working on collaboratively developing new therapeutic goals, increasing vocal congruence when using \"character voices,\" and generalizing a congruent voice.  She also completed the U-GPS today, which indicated speech smoothness, intonation, and resonance as voice characteristics that result in increased vocal incongruence. She will continue to work on her exercises on a daily basis, and work on incorporating the techniques into her daily activities. Speech therapy continues to be medically necessary for Martha to participate fully and engage in activities of daily living.     Plan:   I will see Martha in 2 weeks (1/2/25) and will plan to increase the use of CTT in the context of \"character voices\" and to focus on exercises that entrain speech smoothness, intonation, and resonance.     For home practice goals, see AVS.     TOTAL SERVICE TIME:   Call Initiated at: 0901  Call Ended at: 0956  TREATMENT (23320)    CPT Billing Codes:   TREATMENT OF SPEECH, LANGUAGE, VOICE, COMMUNICATION, and/or AUDITORY PROCESSING DISORDER (66340)    Thank you for allowing me to participate in this patient's care,    Chester Valdes M.M., Ph.D., CF-SLP  Clinical Fellow in Voice and Upper Airway Speech-Language Pathology  [facility]  [email or web address]  Pronouns: he/him    OBJECTIVE FINDINGS  PATIENT REPORTED MEASURES  Patient Supplied Answers To Last 2 VHI Questionnaires      11/25/2024    10:31 AM 12/16/2024     1:07 PM   Voice Handicap Index (VHI-10)   My voice makes it difficult for people to hear me 1  1    People have difficulty understanding me in a noisy room 1  1    My voice difficulties " "restrict my personal and social life.  2  1    I feel left out of conversations because of my voice 2  2    My voice problem causes me to lose income 0  0    I feel as though I have to strain to produce voice 1  2    The clarity of my voice is unpredictable 2  3    My voice problem upsets me 3  3    My voice makes me feel handicapped 2  2    People ask, \"What's wrong with your voice?\" 0  0    VHI-10 14  15        Patient-reported          Patient Supplied Answers To Last 2 CSI Questionnaires      11/25/2024    10:31 AM 12/16/2024     1:07 PM   Cough Severity Index (CSI)   My cough is worse when I lie down 0  0    My coughing problem causes me to restrict my personal and social life 0  0    I tend to avoid places because of my cough problem 0  0    I feel embarrassed because of my coughing problem 1  1    People ask, ''What's wrong?'' because I cough a lot 0  0    I run out of air when I cough 0  0    My coughing problem affects my voice 3  2    My coughing problem limits my physical activity 1  0    My coughing problem upsets me 2  1    People ask me if I am sick because I cough a lot 1  0    CSI Score 8  4        Patient-reported          Patient Supplied Answers To Last 2 EAT Questionnaires       No data to display                  Patient Supplied Answers to Dyspnea Index Questionnaire:       No data to display                 "

## 2025-01-15 ENCOUNTER — VIRTUAL VISIT (OUTPATIENT)
Dept: OTOLARYNGOLOGY | Facility: CLINIC | Age: 35
End: 2025-01-15
Payer: COMMERCIAL

## 2025-01-15 DIAGNOSIS — R49.9 VOICE AND RESONANCE DISORDER: ICD-10-CM

## 2025-01-15 DIAGNOSIS — F64.0 GENDER DYSPHORIA IN ADULT: ICD-10-CM

## 2025-01-15 DIAGNOSIS — R49.0 DYSPHONIA: Primary | ICD-10-CM

## 2025-01-15 NOTE — LETTER
1/15/2025       RE: Martha Rangel  [address] [facility] [address]     Dear Colleague,    Thank you for referring your patient, Martha Rangel, to the [facility]ICE CLIN[facility] [place] at [facility]. Please see a copy of my visit note below.    Martah Rangel is a 34 year old adult who is being evaluated via a billable video visit.      Martha has been notified and verbally consented to the following:   This video visit will be conducted between you and your provider.  Patient has opted to conduct today's video visit vs an in-person appointment.   Video visits are billed at different rates depending on your insurance coverage. Please reach out to your insurance provider with any questions.   If during the course of the call the provider feels the appointment is not appropriate, you will not be charged for this service.  Provider has received verbal consent for billable virtual visit from the patient? Yes  Will anyone else be joining your video visit? No    Call initiated at: 1003   Type of Visit Platform Used: [employer] Video  Location of provider: Home  Location of patient: [facility] VOICE CL[facility]IC  PROGRESS REPORT (CPT 88116)    Patient: Martha Rangel  Date of Service: 1/15/2025  Date of Last Service: 12/18/2024  Number of Visits: 10 total / 10 therapy  Primary Cvg: R [facility] Core  Referring Physician: April Vanessa     Impressions from evaluation on 12/14/2023 by TAWANA Diaz.GORAN M.MTammi (voice), M.A., CCC-SLP:  IMPRESSIONS:  Laryngeal Hyperfunction (J38.7), Dysphonia (R49.0), and 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  Laryngeal function studies show significant increase in trans-glottal airflow compared to age and gender matched norms, increased inferred subglottal pressure, and significantly elevated AVQI. Ms. Rangel demonstrated good learning of early therapeutic activities.       SUBJECTIVE:  Since  "Martha's last session, she reports the following:   She feels like her voice has \"backslid\" since her illness.  She is recovering from a URI.  She reports increased throat clearing symptoms.  She wrote down a few different character voices (and characteristics) to practice:  Dog voice   Creepy person - nasal, breathy  Evil voice - darker  Heroic and boisterous voice - deep, full, dark, ring  Doofy/Goofy voice - nasal, drawn out, darker, articulation changes  Primary patient goal: Working on \"character voices\" with a congruent voice; Recover from illness    OBJECTIVE:  Ms. Rangel presents today with the following:  Voice Quality:  Speaking voice:  Roughness: WNL  Breathiness: WNL  Strain: WNL  Pitch: WNL  Loudness: WNL  F0/ Habitual pitch: G3-A3  Throat Issues:  Cough:  None reported today  Throat Clearing: Occasionally observed today.  Globus Sensation: None reported today.  Throat Pain/Discomfort: None reported today.    Patient Specific Goal Metrics:      11/1/2024     8:00 AM 12/18/2024     9:00 AM 1/15/2025    10:00 AM   Dysponia SLP Goals   How would you rate your speaking voice quality, if 0 is worst voice quality, and 10 is best voice?   4   How much effort is it to speak, if 0 is no extra effort and 10 is maximum effort? 4 2 2   How well does your voice align with your identity, where 0 is \"my voice doesn't align at all\", and 10 is \"my voice aligns completely\"? 5 4 4   How severe is your cough /throat clearing, if 0 is no cough at all and 10 is the worst cough? 6 7 8   How much does your voice problem bother you? Somewhat Quite a bit Quite a bit     [place] Gender Presentation Scale for Communication (U-GPS)    The U-GPS (Gwyn Aguero & Willy, 2024) is a validated measure of voice-related gender incongruence.  It provides an opportunity for clients to rate their own voice and vocal targets based on 10 parameters and on a 9-point gender spectrum continuum.  It allows clients an opportunity to express which " "areas are important to them and to identify their own personal set of vocal goals.  For a trans female individual, a meaningful change in incongruence score is 9.35 (0.5 x SD).    Completed 12/18/2024    Characteristic/Quality Current Goal   Pitch 8 8   Intonation 4 8   Resonance 6 9   Loudness 6 7   Speech Smoothness 4 8   Speech Clarity 7 7   Word Choice 5 7   Facial Expression 5 7   Gesture 6 7   Posture 6 7     Current Score   (Sum of Current Ratings) 51   Goal Score  (Sum of Goal Ratings) 75     Incongruence Score  (Sum of Differences) 16     Note: Feels like they have improved toward their goals with speech smoothness, but speech smoothness and intonation are the most important characteristics to change.      THERAPEUTIC ACTIVITIES  Today Martha participated in the following therapeutic activities:  Counseling and Education:  Asked  questions about the nature of her symptoms, and I answered all of these thoroughly.  Provided education related to voice recovery following illness.    I provided Martha with explanation and skilled instruction of:    The patient was instructed on the use of semi-occluded vocal tract exercises to decrease vocal strain, increase vocal tract dilation, and improve respiratory-phonatory coordination.  The patient used straw phonation with water resistance with unstructured sustained phonation and ascending and descending pitch glides with minimal verbal and vowel cues.  Trials to use this task as a preparatory gesture at the word-level were not facilitating.  Both versions of this task were completed 3 times.  The patient remarked, \"That feels better.\"  The patient used liptrills with structured (C#4 to F#4) descending pitch glides with minimal verbal and negative practice cues.  Trials to use this task as a preparatory gesture at the word-level were facilitating.  The patient observed, \"It feels like my lips are looser. [My voice] sounds about the same now.\"  The patient later " "observed decreased urge to throat clear and decreased throat tightness after completing this exercise.    The patient was instructed on the use of conversational training therapy (CTT) to improve respiratory-phonatory coordination, to improve vocal congruence, and to increase generalization to conversational speech.  The patient used CTT with a focus on slightly elevated pitch and use of flow phonation at the conversational-level with minimal verbal cues.  The patient completed this task for approximately 15 minutes.    Instruction of Home Practice:  A revised home exercise plan (HEP) was collaboratively designed and instructed.  The patient confirmed that the HEP was reasonable and that it seemed attainable for daily practice.  Patient will find some children's books to read with a slightly elevated pitch and increased flow.  Patient identified video hitesh as an activity that would be helpful to practice.  [employer]  [employer]  Back for Blood  Risk of Rain 2  I provided an AVS of important notes of today's therapeutic activities to facilitate practice.    ASSESSMENT/PLAN  Progress toward long-term goals:  Adequate but incomplete progress; please see above    Impressions:  IMPRESSIONS:    Ms. 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 Martha had a productive session of therapy today, post-illness voice use/rehabilitation, working on increasing vocal congruence when using \"character voices,\" and generalizing a congruent voice. Notably, liptrills were facilitating for increased vocal clarity, elevated vocal pitch, and decreased throat clearing symptoms. She will continue to work on her exercises on a daily basis, and work on incorporating the techniques into her daily activities. Speech therapy continues to be medically necessary for Martha to participate fully and engage in activities of daily " "living.     Plan:   I will see Martha in 4 weeks (2/14/25) and will plan to increase the use of CTT in the context of \"character voices\" and to focus on socially relevant generalization exercises.    For home practice goals, see AVS.     TOTAL SERVICE TIME:   Call Initiated at: 1003  Call Ended at: 1055  TREATMENT ([id number])    CPT Billing Codes:   TREATMENT OF SPEECH, LANGUAGE, VOICE, COMMUNICATION, and/or AUDITORY PROCESSING DISORDER (55891)    Thank you for allowing me to participate in this patient's care,    Chester Valdes M.M., Ph.D., CF-SLP  Clinical Fellow in Voice and Upper Airway Speech-Language Pathology  [facility]  [email or web address]  Pronouns: he/him    OBJECTIVE FINDINGS  PATIENT REPORTED MEASURES  Patient Supplied Answers To Last 2 VHI Questionnaires      12/16/2024     1:07 PM 1/14/2025    10:33 AM   Voice Handicap Index (VHI-10)   My voice makes it difficult for people to hear me 1 1   People have difficulty understanding me in a noisy room 1 2   My voice difficulties restrict my personal and social life.  1 1   I feel left out of conversations because of my voice 2 2   My voice problem causes me to lose income 0 0   I feel as though I have to strain to produce voice 2 2   The clarity of my voice is unpredictable 3 2   My voice problem upsets me 3 3   My voice makes me feel handicapped 2 2   People ask, \"What's wrong with your voice?\" 0 0   VHI-10 15  15        Patient-reported          Patient Supplied Answers To Last 2 CSI Questionnaires      12/16/2024     1:07 PM 1/14/2025    10:33 AM   Cough Severity Index (CSI)   My cough is worse when I lie down 0 1   My coughing problem causes me to restrict my personal and social life 0 0   I tend to avoid places because of my cough problem 0 0   I feel embarrassed because of my coughing problem 1 1   People ask, ''What's wrong?'' because I cough a lot 0 0   I run out of air when I cough 0 0   My coughing problem " affects my voice 2 3   My coughing problem limits my physical activity 0 0   My coughing problem upsets me 1 2   People ask me if I am sick because I cough a lot 0 1   CSI Score 4  8        Patient-reported          Patient Supplied Answers To Last 2 EAT Questionnaires       No data to display                  Patient Supplied Answers to Dyspnea Index Questionnaire:       No data to display                   Attestation signed by Rock Eastman, SLP at 1/22/2025  8:09 AM:  I attest that these services performed under my supervision, and I agree with the information contained in within this note.    Rock Eastman M.M., M.A., CCC-SLP  Speech-Language Pathologist  Certificate of Vocology  [phone]      Again, thank you for allowing me to participate in the care of your patient.      Sincerely,    Chester Valdes, SLP

## 2025-01-15 NOTE — PATIENT INSTRUCTIONS
It was cely working with you today!  Here are some carter takeaways from our session:    1) Use a combination of straw bubbles and liptrills to find that slightly elevated pitch with increased flow. Today, you found that the voice you used after liptrills did not cause tightness or induce throat clearing.    2) Find a children's book and read it with a slightly elevated pitch and increased flow.    3) Bring a laptop (if possible) to your next session with a game you typically play with friends. Let's think about how we might use it during your therapy session.    Feel free to reach out if you have any questions!    Best,    Ricardo Valdes M.M., Ph.D., CF-SLP  Clinical Fellow in Voice and Upper Airway Speech-Language Pathology  [facility] Leonor [email or web address]  Pronouns: he/him

## 2025-01-15 NOTE — PROGRESS NOTES
"Martha Rangel is a 34 year old adult who is being evaluated via a billable video visit.      Martha has been notified and verbally consented to the following:   This video visit will be conducted between you and your provider.  Patient has opted to conduct today's video visit vs an in-person appointment.   Video visits are billed at different rates depending on your insurance coverage. Please reach out to your insurance provider with any questions.   If during the course of the call the provider feels the appointment is not appropriate, you will not be charged for this service.  Provider has received verbal consent for billable virtual visit from the patient? Yes  Will anyone else be joining your video visit? No    Call initiated at: 1003   Type of Visit Platform Used: Novad[employer] Video  Location of provider: Home  Location of patient: [facility] VOICE CLINIC  PROGRESS REPORT (CPT 91139)    Patient: Martha Rangel  Date of Service: 1/15/2025  Date of Last Service: 12/18/2024  Number of Visits: 10 total / 10 therapy  Primary Cvg: [facility]  Referring Physician: April Vanessa     Impressions from evaluation on 12/14/2023 by Mayuri Barton, [email or web address].[email or web address], M.MTammi (voice), M.A., CCC-SLP:  IMPRESSIONS:  Laryngeal Hyperfunction (J38.7), Dysphonia (R49.0), and 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  Laryngeal function studies show significant increase in trans-glottal airflow compared to age and gender matched norms, increased inferred subglottal pressure, and significantly elevated AVQI. Ms. Rangel demonstrated good learning of early therapeutic activities.       SUBJECTIVE:  Since Martha's last session, she reports the following:   She feels like her voice has \"backslid\" since her illness.  She is recovering from a URI.  She reports increased throat clearing symptoms.  She wrote down a few different character voices (and characteristics) to practice:  Dog voice   Creepy person - nasal, " "breathy  Evil voice - darker  Heroic and boisterous voice - deep, full, dark, ring  Doofy/Goofy voice - nasal, drawn out, darker, articulation changes  Primary patient goal: Working on \"character voices\" with a congruent voice; Recover from illness    OBJECTIVE:  Ms. Rangel presents today with the following:  Voice Quality:  Speaking voice:  Roughness: WNL  Breathiness: WNL  Strain: WNL  Pitch: WNL  Loudness: WNL  F0/ Habitual pitch: G3-A3  Throat Issues:  Cough:  None reported today  Throat Clearing: Occasionally observed today.  Globus Sensation: None reported today.  Throat Pain/Discomfort: None reported today.    Patient Specific Goal Metrics:      11/1/2024     8:00 AM 12/18/2024     9:00 AM 1/15/2025    10:00 AM   Dysponia SLP Goals   How would you rate your speaking voice quality, if 0 is worst voice quality, and 10 is best voice?   4   How much effort is it to speak, if 0 is no extra effort and 10 is maximum effort? 4 2 2   How well does your voice align with your identity, where 0 is \"my voice doesn't align at all\", and 10 is \"my voice aligns completely\"? 5 4 4   How severe is your cough /throat clearing, if 0 is no cough at all and 10 is the worst cough? 6 7 8   How much does your voice problem bother you? Somewhat Quite a bit Quite a bit     [place] Gender Presentation Scale for Communication (U-GPS)    The U-GPS (Gwyn Aguero, & Willy, 2024) is a validated measure of voice-related gender incongruence.  It provides an opportunity for clients to rate their own voice and vocal targets based on 10 parameters and on a 9-point gender spectrum continuum.  It allows clients an opportunity to express which areas are important to them and to identify their own personal set of vocal goals.  For a trans female individual, a meaningful change in incongruence score is 9.35 (0.5 x SD).    Completed 12/18/2024    Characteristic/Quality Current Goal   Pitch 8 8   Intonation 4 8   Resonance 6 9   Loudness 6 7   Speech " "Smoothness 4 8   Speech Clarity 7 7   Word Choice 5 7   Facial Expression 5 7   Gesture 6 7   Posture 6 7     Current Score   (Sum of Current Ratings) 51   Goal Score  (Sum of Goal Ratings) 75     Incongruence Score  (Sum of Differences) 16     Note: Feels like they have improved toward their goals with speech smoothness, but speech smoothness and intonation are the most important characteristics to change.      THERAPEUTIC ACTIVITIES  Today Martha participated in the following therapeutic activities:  Counseling and Education:  Asked  questions about the nature of her symptoms, and I answered all of these thoroughly.  Provided education related to voice recovery following illness.    I provided Martha with explanation and skilled instruction of:    The patient was instructed on the use of semi-occluded vocal tract exercises to decrease vocal strain, increase vocal tract dilation, and improve respiratory-phonatory coordination.  The patient used straw phonation with water resistance with unstructured sustained phonation and ascending and descending pitch glides with minimal verbal and vowel cues.  Trials to use this task as a preparatory gesture at the word-level were not facilitating.  Both versions of this task were completed 3 times.  The patient remarked, \"That feels better.\"  The patient used liptrills with structured (C#4 to F#4) descending pitch glides with minimal verbal and negative practice cues.  Trials to use this task as a preparatory gesture at the word-level were facilitating.  The patient observed, \"It feels like my lips are looser. [My voice] sounds about the same now.\"  The patient later observed decreased urge to throat clear and decreased throat tightness after completing this exercise.    The patient was instructed on the use of conversational training therapy (CTT) to improve respiratory-phonatory coordination, to improve vocal congruence, and to increase generalization to conversational " "speech.  The patient used CTT with a focus on slightly elevated pitch and use of flow phonation at the conversational-level with minimal verbal cues.  The patient completed this task for approximately 15 minutes.    Instruction of Home Practice:  A revised home exercise plan (HEP) was collaboratively designed and instructed.  The patient confirmed that the HEP was reasonable and that it seemed attainable for daily practice.  Patient will find some children's books to read with a slightly elevated pitch and increased flow.  Patient identified video hitesh as an activity that would be helpful to practice.  Overwatch  [employer]  Back for Blood  Risk of Rain 2  I provided an AVS of important notes of today's therapeutic activities to facilitate practice.    ASSESSMENT/PLAN  Progress toward long-term goals:  Adequate but incomplete progress; please see above    Impressions:  IMPRESSIONS:    Ms. 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 Martha had a productive session of therapy today, post-illness voice use/rehabilitation, working on increasing vocal congruence when using \"character voices,\" and generalizing a congruent voice. Notably, liptrills were facilitating for increased vocal clarity, elevated vocal pitch, and decreased throat clearing symptoms. She will continue to work on her exercises on a daily basis, and work on incorporating the techniques into her daily activities. Speech therapy continues to be medically necessary for Martha to participate fully and engage in activities of daily living.     Plan:   I will see Martha in 4 weeks (2/14/25) and will plan to increase the use of CTT in the context of \"character voices\" and to focus on socially relevant generalization exercises.    For home practice goals, see AVS.     TOTAL SERVICE TIME:   Call Initiated at: 1003  Call Ended at: " "1055  TREATMENT (59568)    CPT Billing Codes:   TREATMENT OF SPEECH, LANGUAGE, VOICE, COMMUNICATION, and/or AUDITORY PROCESSING DISORDER (61043)    Thank you for allowing me to participate in this patient's care,    Chester Valdes M.M., Ph.D., CF-SLP  Clinical Fellow in Voice and Upper Airway Speech-Language Pathology  [facility][email or web address]  Pronouns: he/him    OBJECTIVE FINDINGS  PATIENT REPORTED MEASURES  Patient Supplied Answers To Last 2 VHI Questionnaires      12/16/2024     1:07 PM 1/14/2025    10:33 AM   Voice Handicap Index (VHI-10)   My voice makes it difficult for people to hear me 1 1   People have difficulty understanding me in a noisy room 1 2   My voice difficulties restrict my personal and social life.  1 1   I feel left out of conversations because of my voice 2 2   My voice problem causes me to lose income 0 0   I feel as though I have to strain to produce voice 2 2   The clarity of my voice is unpredictable 3 2   My voice problem upsets me 3 3   My voice makes me feel handicapped 2 2   People ask, \"What's wrong with your voice?\" 0 0   VHI-10 15  15        Patient-reported          Patient Supplied Answers To Last 2 CSI Questionnaires      12/16/2024     1:07 PM 1/14/2025    10:33 AM   Cough Severity Index (CSI)   My cough is worse when I lie down 0 1   My coughing problem causes me to restrict my personal and social life 0 0   I tend to avoid places because of my cough problem 0 0   I feel embarrassed because of my coughing problem 1 1   People ask, ''What's wrong?'' because I cough a lot 0 0   I run out of air when I cough 0 0   My coughing problem affects my voice 2 3   My coughing problem limits my physical activity 0 0   My coughing problem upsets me 1 2   People ask me if I am sick because I cough a lot 0 1   CSI Score 4  8        Patient-reported          Patient Supplied Answers To Last 2 EAT Questionnaires       No data to display       "            Patient Supplied Answers to Dyspnea Index Questionnaire:       No data to display

## 2025-02-09 ENCOUNTER — MYC MEDICAL ADVICE (OUTPATIENT)
Dept: FAMILY MEDICINE | Facility: CLINIC | Age: 35
End: 2025-02-09
Payer: COMMERCIAL

## 2025-02-09 DIAGNOSIS — F64.0 GENDER DYSPHORIA IN ADULT: Primary | ICD-10-CM

## 2025-02-11 NOTE — TELEPHONE ENCOUNTER
CC successfully faxed referral to [place] Referral department at Fax: [phone].    Jose Salgado  Care Coordinator- [facility]  [phone]

## 2025-02-14 ENCOUNTER — OFFICE VISIT (OUTPATIENT)
Dept: OTOLARYNGOLOGY | Facility: CLINIC | Age: 35
End: 2025-02-14
Payer: COMMERCIAL

## 2025-02-14 DIAGNOSIS — R49.0 DYSPHONIA: Primary | ICD-10-CM

## 2025-02-14 DIAGNOSIS — R49.9 VOICE AND RESONANCE DISORDER: ICD-10-CM

## 2025-02-14 DIAGNOSIS — F64.0 GENDER DYSPHORIA IN ADULT: ICD-10-CM

## 2025-02-14 NOTE — PROGRESS NOTES
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  Laryngeal function studies show significant increase in trans-glottal airflow compared to age and gender matched norms, increased inferred subglottal pressure, and significantly elevated AVQI. Ms. Rangel demonstrated good learning of early therapeutic activities.       SUBJECTIVE:  Since Martha's last session, she reports the following:   She reports that symptoms have improved overall since her last visit.  She endorses frequent throat clearing.    She wrote down a few different character voices (and characteristics) to practice:  Dog voice   Creepy person - nasal, breathy  Evil voice - darker  Heroic and boisterous voice - deep, full, dark, ring  Doofy/Goofy voice - nasal, drawn out, darker, articulation changes  Primary patient goal: Working on \"character voices\" with a congruent voice; Recover from illness    OBJECTIVE:  Ms. Rangel presents today with the following:  Voice Quality:  Speaking voice:  Roughness: WNL  Breathiness: WNL  Strain: WNL  Pitch: WNL  Loudness: WNL  F0/ Habitual pitch: G3-A3  Throat Issues:  Cough:  None reported today  Throat Clearing: Occasionally observed today.  Globus Sensation: None reported today.  Throat Pain/Discomfort: None reported today.    Patient Specific Goal Metrics:      12/18/2024     9:00 AM 1/15/2025    10:00 AM 2/14/2025     9:00 AM   Dysponia SLP Goals   How would you rate your speaking voice quality, if 0 is worst voice quality, and 10 is best voice?  4 5   How much effort is it to " "speak, if 0 is no extra effort and 10 is maximum effort? 2 2 3   How well does your voice align with your identity, where 0 is \"my voice doesn't align at all\", and 10 is \"my voice aligns completely\"? 4 4 7   How severe is your cough /throat clearing, if 0 is no cough at all and 10 is the worst cough? 7 8 7   How much does your voice problem bother you? Quite a bit Quite a bit Quite a bit   How much does your cough/throat-clearing problem bother you?              Quite a bit     [place] Gender Presentation Scale for Communication (U-GPS)    The U-GPS (Laci, Gwyn, & Willy, 2024) is a validated measure of voice-related gender incongruence.  It provides an opportunity for clients to rate their own voice and vocal targets based on 10 parameters and on a 9-point gender spectrum continuum.  It allows clients an opportunity to express which areas are important to them and to identify their own personal set of vocal goals.  For a trans female individual, a meaningful change in incongruence score is 9.35 (0.5 x SD).    Completed 2/14/2025    Characteristic/Quality Current Goal   Pitch 6 7   Intonation 5 8   Resonance 6 8   Loudness 6 7   Speech Smoothness 7 9   Speech Clarity 6 8   Word Choice 6 8   Facial Expression 7 9   Gesture 8 8   Posture 8 8     Current Score   (Sum of Current Ratings) 65   Goal Score  (Sum of Goal Ratings) 80     Incongruence Score  (Sum of Differences) 15     Completed 12/18/2024    Characteristic/Quality Current Goal   Pitch 8 8   Intonation 4 8   Resonance 6 9   Loudness 6 7   Speech Smoothness 4 8   Speech Clarity 7 7   Word Choice 5 7   Facial Expression 5 7   Gesture 6 7   Posture 6 7     Current Score   (Sum of Current Ratings) 51   Goal Score  (Sum of Goal Ratings) 75     Incongruence Score  (Sum of Differences) 16     Note: Feels like they have improved toward their goals with speech smoothness, but speech smoothness and intonation are the most important characteristics to " "change.      THERAPEUTIC ACTIVITIES  Today Martha participated in the following therapeutic activities:  Counseling and Education:  Asked  questions about the nature of her symptoms, and I answered all of these thoroughly.  Provided education related to voice recovery following illness.    I provided Martha with explanation and skilled instruction of:    The patient was instructed on cough/throat clear replacement strategies to reduce the frequency and severity of cough/throat clearing episodes.  The patient used the following cough/throat clear replacement strategies with benefit:  Sipping water was trialed with a high degree of accuracy.  The patient observed, \"There was one moment where it felt things built up to a point where the water wasn't sufficient to clear.\"   Patient noted that it feels like a helpful strategy.  Straw phonation with water resistance was trialed with a moderate degree of accuracy.  The patient stated that this strategy felt more facilitating than the gargle.  Throat lozenges were discussed and a rationale for use was provided.  The patient stated that they were willing to attempt this strategy at work.  The following cough/throat clear replacement strategies were not facilitating:  Gargle phonation  The patient observed, \"It did feel helpful, but it also felt like it \"loosened things up.'\"    The patient was instructed in the use of self-facilitated manual therapy probing to identify tenderness/discomfort/pain related to the extrinsic laryngeal musculature.   The patient identified sternocleidomastoid as tight bilaterally.    The patient was instructed on the use of semi-occluded vocal tract exercises to decrease vocal strain, increase vocal tract dilation, and improve respiratory-phonatory coordination.  The patient used straw phonation with water resistance with unstructured sustained phonation and ascending and descending pitch glides with minimal verbal and vowel cues.  Both versions " "of this task were completed 2-3 times.  Trials to use this task as a preparatory gesture at the word-level were facilitating for increased speech smoothness.    The patient was instructed on the use of storybook reading to  improve respiratory-phonatory coordination, to improve vocal congruence, and to increase generalization to conversational speech.  The patient recorded their speech and identified speech smoothness as a vocal characteristic that would benefit from improvement.  Straw phonation with water resistance was implemented again with benefit.  The patient read \"[employer] and [employer]\" with a  focus on flow phonation with minimal verbal and modeling cues.  The patient stated, \"It sounds a lot smoother than the first time.\"  The patient read \"[employer] and Ham\" with a focus on resonance with minimal verbal and modeling cues.    Instruction of Home Practice:  A revised home exercise plan (HEP) was collaboratively designed and instructed.  The patient confirmed that the HEP was reasonable and that it seemed attainable for daily practice.  Patient identified video hitesh as an activity that would be helpful to practice.  [employer]  [employer] [place] [facility]  Back for Blood  Risk of Rain 2  I provided an AVS of important notes of today's therapeutic activities to facilitate practice.    ASSESSMENT/PLAN  Progress toward long-term goals:  Adequate but incomplete progress; please see above    Impressions:  IMPRESSIONS:    Ms. 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 Martha had a productive session of therapy today working on combining speech smoothness and anterior resonance, completing the U-GPS, learning throat clear replacement strategies, and developing auditory-perceptual awareness. When listening to a recording of her voice, we both identified hard glottal onsets during vowel-initial words, which we " "will work to address in our next session. She will continue to work on her exercises on a daily basis, and work on incorporating the techniques into her daily activities. Speech therapy continues to be medically necessary for Martha to participate fully and engage in activities of daily living.     Plan:   I will see Martha in 4 weeks (3/14/25) and will plan to focus on socially relevant generalization exercises and to reduce use of glottal onsets using functional exercises with variegated pitch and loudness patterns.    For home practice goals, see AVS.     TOTAL SERVICE TIME:   Call Initiated at: 0855  Call Ended at: 0955  TREATMENT (23242)    CPT Billing Codes:   TREATMENT OF SPEECH, LANGUAGE, VOICE, COMMUNICATION, and/or AUDITORY PROCESSING DISORDER (60995)    Thank you for allowing me to participate in this patient's care,    Chester Valdes M.M., Ph.D., CF-SLP  Clinical Fellow in Voice and Upper Airway Speech-Language Pathology  [facility]  [email or web address]  Pronouns: he/him    OBJECTIVE FINDINGS  PATIENT REPORTED MEASURES  Patient Supplied Answers To Last 2 VHI Questionnaires      1/14/2025    10:33 AM 2/11/2025    10:29 AM   Voice Handicap Index (VHI-10)   My voice makes it difficult for people to hear me 1 2   People have difficulty understanding me in a noisy room 2 1   My voice difficulties restrict my personal and social life.  1 0   I feel left out of conversations because of my voice 2 0   My voice problem causes me to lose income 0 0   I feel as though I have to strain to produce voice 2 2   The clarity of my voice is unpredictable 2 3   My voice problem upsets me 3 3   My voice makes me feel handicapped 2 1   People ask, \"What's wrong with your voice?\" 0 0   VHI-10 15  12        Patient-reported          Patient Supplied Answers To Last 2 CSI Questionnaires      1/14/2025    10:33 AM 2/11/2025    10:29 AM   Cough Severity Index (CSI)   My cough is worse " when I lie down 1 0   My coughing problem causes me to restrict my personal and social life 0 0   I tend to avoid places because of my cough problem 0 0   I feel embarrassed because of my coughing problem 1 0   People ask, ''What's wrong?'' because I cough a lot 0 0   I run out of air when I cough 0 0   My coughing problem affects my voice 3 2   My coughing problem limits my physical activity 0 0   My coughing problem upsets me 2 1   People ask me if I am sick because I cough a lot 1 0   CSI Score 8  3        Patient-reported          Patient Supplied Answers To Last 2 EAT Questionnaires       No data to display                  Patient Supplied Answers to Dyspnea Index Questionnaire:       No data to display

## 2025-02-14 NOTE — PATIENT INSTRUCTIONS
"It was cely working with you today!  Here are some carter takeaways from our session:    1) We tried some manual therapy probes and your sternocleidomastoids were quite tight.  Consider asking your PCP for a referral to a PT for Myofacial Release.     2) Try out pectin-based lozenges for replacing your throat clear when at work.   -Sipping water   -Straw bubbles   -Effortful swallow    3) For the next four weeks, try to focus on the \"opposite game\" with resonance (I.e. front/back) and smoothness (alternatively, you can just use straw bubbles to increase smoothness and then move right to resonance).    4) Then, combine smoothness and resonance when reading sentences from ChatGPT or a storybook (or anything really).    Let me know if you have any questions or if you need a refresher session before our next appointment!    Ricardo Chance M.M., Ph.D., CF-SLP  Clinical Fellow in Voice and Upper Airway Speech-Language Pathology  [facility] [email or web address] [email or web address]  Pronouns: he/him      "

## 2025-02-14 NOTE — LETTER
"2/14/2025       RE: Martha Rangel  [address]  [facility] [address]     Dear Colleague,    Thank you for referring your patient, Martha Rangel, to the [facility]ICE [facility] [place] [facility]. Please see a copy of my visit note below.    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  Laryngeal function studies show significant increase in trans-glottal airflow compared to age and gender matched norms, increased inferred subglottal pressure, and significantly elevated AVQI. Ms. Rangel demonstrated good learning of early therapeutic activities.       SUBJECTIVE:  Since Martha's last session, she reports the following:   She reports that symptoms have improved overall since her last visit.  She endorses frequent throat clearing.    She wrote down a few different character voices (and characteristics) to practice:  Dog voice   Creepy person - nasal, breathy  Evil voice - darker  Heroic and boisterous voice - deep, full, dark, ring  Doofy/Goofy voice - nasal, drawn out, darker, articulation changes  Primary patient goal: Working on \"character voices\" with a congruent voice; Recover from illness    OBJECTIVE:  Ms. Rangel presents today with the following:  Voice Quality:  Speaking voice:  Roughness: WNL  Breathiness: WNL  Strain: WNL  Pitch: WNL  Loudness: WNL  F0/ Habitual pitch: G3-A3  Throat Issues:  Cough:  None reported today  Throat Clearing: Occasionally observed today.  Globus Sensation: None reported " "today.  Throat Pain/Discomfort: None reported today.    Patient Specific Goal Metrics:      12/18/2024     9:00 AM 1/15/2025    10:00 AM 2/14/2025     9:00 AM   Dysponia SLP Goals   How would you rate your speaking voice quality, if 0 is worst voice quality, and 10 is best voice?  4 5   How much effort is it to speak, if 0 is no extra effort and 10 is maximum effort? 2 2 3   How well does your voice align with your identity, where 0 is \"my voice doesn't align at all\", and 10 is \"my voice aligns completely\"? 4 4 7   How severe is your cough /throat clearing, if 0 is no cough at all and 10 is the worst cough? 7 8 7   How much does your voice problem bother you? Quite a bit Quite a bit Quite a bit   How much does your cough/throat-clearing problem bother you?              Quite a bit     [place] Gender Presentation Scale for Communication (U-GPS)    The U-GPS (Laci, Gwyn, & Willy, 2024) is a validated measure of voice-related gender incongruence.  It provides an opportunity for clients to rate their own voice and vocal targets based on 10 parameters and on a 9-point gender spectrum continuum.  It allows clients an opportunity to express which areas are important to them and to identify their own personal set of vocal goals.  For a trans female individual, a meaningful change in incongruence score is 9.35 (0.5 x SD).    Completed 2/14/2025    Characteristic/Quality Current Goal   Pitch 6 7   Intonation 5 8   Resonance 6 8   Loudness 6 7   Speech Smoothness 7 9   Speech Clarity 6 8   Word Choice 6 8   Facial Expression 7 9   Gesture 8 8   Posture 8 8     Current Score   (Sum of Current Ratings) 65   Goal Score  (Sum of Goal Ratings) 80     Incongruence Score  (Sum of Differences) 15     Completed 12/18/2024    Characteristic/Quality Current Goal   Pitch 8 8   Intonation 4 8   Resonance 6 9   Loudness 6 7   Speech Smoothness 4 8   Speech Clarity 7 7   Word Choice 5 7   Facial Expression 5 7   Gesture 6 7   Posture 6 7 " "    Current Score   (Sum of Current Ratings) 51   Goal Score  (Sum of Goal Ratings) 75     Incongruence Score  (Sum of Differences) 16     Note: Feels like they have improved toward their goals with speech smoothness, but speech smoothness and intonation are the most important characteristics to change.      THERAPEUTIC ACTIVITIES  Today Martha participated in the following therapeutic activities:  Counseling and Education:  Asked  questions about the nature of her symptoms, and I answered all of these thoroughly.  Provided education related to voice recovery following illness.    I provided Martha with explanation and skilled instruction of:    The patient was instructed on cough/throat clear replacement strategies to reduce the frequency and severity of cough/throat clearing episodes.  The patient used the following cough/throat clear replacement strategies with benefit:  Sipping water was trialed with a high degree of accuracy.  The patient observed, \"There was one moment where it felt things built up to a point where the water wasn't sufficient to clear.\"   Patient noted that it feels like a helpful strategy.  Straw phonation with water resistance was trialed with a moderate degree of accuracy.  The patient stated that this strategy felt more facilitating than the gargle.  Throat lozenges were discussed and a rationale for use was provided.  The patient stated that they were willing to attempt this strategy at work.  The following cough/throat clear replacement strategies were not facilitating:  Gargle phonation  The patient observed, \"It did feel helpful, but it also felt like it \"loosened things up.'\"    The patient was instructed in the use of self-facilitated manual therapy probing to identify tenderness/discomfort/pain related to the extrinsic laryngeal musculature.   The patient identified sternocleidomastoid as tight bilaterally.    The patient was instructed on the use of semi-occluded vocal tract " "exercises to decrease vocal strain, increase vocal tract dilation, and improve respiratory-phonatory coordination.  The patient used straw phonation with water resistance with unstructured sustained phonation and ascending and descending pitch glides with minimal verbal and vowel cues.  Both versions of this task were completed 2-3 times.  Trials to use this task as a preparatory gesture at the word-level were facilitating for increased speech smoothness.    The patient was instructed on the use of storybook reading to  improve respiratory-phonatory coordination, to improve vocal congruence, and to increase generalization to conversational speech.  The patient recorded their speech and identified speech smoothness as a vocal characteristic that would benefit from improvement.  Straw phonation with water resistance was implemented again with benefit.  The patient read \"Green [employer] and Ham\" with a  focus on flow phonation with minimal verbal and modeling cues.  The patient stated, \"It sounds a lot smoother than the first time.\"  The patient read \"Green [employer] and Ham\" with a focus on resonance with minimal verbal and modeling cues.    Instruction of Home Practice:  A revised home exercise plan (HEP) was collaboratively designed and instructed.  The patient confirmed that the HEP was reasonable and that it seemed attainable for daily practice.  Patient identified video hitesh as an activity that would be helpful to practice.  [employer]  [employer]  Back for Blood  Risk of Rain 2  I provided an AVS of important notes of today's therapeutic activities to facilitate practice.    ASSESSMENT/PLAN  Progress toward long-term goals:  Adequate but incomplete progress; please see above    Impressions:  IMPRESSIONS:    Ms. 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 Martha had a productive session of therapy " today working on combining speech smoothness and anterior resonance, completing the U-GPS, learning throat clear replacement strategies, and developing auditory-perceptual awareness. When listening to a recording of her voice, we both identified hard glottal onsets during vowel-initial words, which we will work to address in our next session. She will continue to work on her exercises on a daily basis, and work on incorporating the techniques into her daily activities. Speech therapy continues to be medically necessary for Martha to participate fully and engage in activities of daily living.     Plan:   I will see Martha in 4 weeks (3/14/25) and will plan to focus on socially relevant generalization exercises and to reduce use of glottal onsets using functional exercises with variegated pitch and loudness patterns.    For home practice goals, see AVS.     TOTAL SERVICE TIME:   Call Initiated at: [phone]  Call Ended at: [phone]  TREATMENT (22219)    CPT Billing Codes:   TREATMENT OF SPEECH, LANGUAGE, VOICE, COMMUNICATION, and/or AUDITORY PROCESSING DISORDER (80798)    Thank you for allowing me to participate in this patient's care,    Chester Valdes M.M., Ph.D., CF-SLP  Clinical Fellow in Voice and Upper Airway Speech-Language Pathology  [facility]  [email or web address]  Pronouns: he/him    OBJECTIVE FINDINGS  PATIENT REPORTED MEASURES  Patient Supplied Answers To Last 2 VHI Questionnaires      1/14/2025    10:33 AM 2/11/2025    10:29 AM   Voice Handicap Index (VHI-10)   My voice makes it difficult for people to hear me 1 2   People have difficulty understanding me in a noisy room 2 1   My voice difficulties restrict my personal and social life.  1 0   I feel left out of conversations because of my voice 2 0   My voice problem causes me to lose income 0 0   I feel as though I have to strain to produce voice 2 2   The clarity of my voice is unpredictable 2 3   My voice problem  "upsets me 3 3   My voice makes me feel handicapped 2 1   People ask, \"What's wrong with your voice?\" 0 0   VHI-10 15  12        Patient-reported          Patient Supplied Answers To Last 2 CSI Questionnaires      1/14/2025    10:33 AM 2/11/2025    10:29 AM   Cough Severity Index (CSI)   My cough is worse when I lie down 1 0   My coughing problem causes me to restrict my personal and social life 0 0   I tend to avoid places because of my cough problem 0 0   I feel embarrassed because of my coughing problem 1 0   People ask, ''What's wrong?'' because I cough a lot 0 0   I run out of air when I cough 0 0   My coughing problem affects my voice 3 2   My coughing problem limits my physical activity 0 0   My coughing problem upsets me 2 1   People ask me if I am sick because I cough a lot 1 0   CSI Score 8  3        Patient-reported          Patient Supplied Answers To Last 2 EAT Questionnaires       No data to display                  Patient Supplied Answers to Dyspnea Index Questionnaire:       No data to display                   Attestation signed by Stefany Mo, TAWANA at 2/17/2025 10:02 AM:  ---I attest that the services performed were provided by a clinical fellow with my direct supervision. ---    Sayda Lara (voice), M.S., CCC-SLP  Speech-Language Pathologist  [facility]  [phone]  [email or web address]  Pronouns: she/her/hers      Again, thank you for allowing me to participate in the care of your patient.      Sincerely,    Chester Valdes SLP    "

## 2025-02-17 ENCOUNTER — PATIENT OUTREACH (OUTPATIENT)
Dept: CARE COORDINATION | Facility: CLINIC | Age: 35
End: 2025-02-17
Payer: COMMERCIAL

## 2025-03-03 ENCOUNTER — PATIENT OUTREACH (OUTPATIENT)
Dept: CARE COORDINATION | Facility: CLINIC | Age: 35
End: 2025-03-03
Payer: COMMERCIAL

## 2025-03-04 ENCOUNTER — MYC REFILL (OUTPATIENT)
Dept: FAMILY MEDICINE | Facility: CLINIC | Age: 35
End: 2025-03-04
Payer: COMMERCIAL

## 2025-03-04 DIAGNOSIS — F90.0 ATTENTION DEFICIT HYPERACTIVITY DISORDER (ADHD), PREDOMINANTLY INATTENTIVE TYPE: ICD-10-CM

## 2025-03-04 RX ORDER — LISDEXAMFETAMINE DIMESYLATE 30 MG/1
30 CAPSULE ORAL DAILY
Qty: 30 CAPSULE | Refills: 0 | Status: CANCELLED | OUTPATIENT
Start: 2025-03-04

## 2025-03-05 NOTE — TELEPHONE ENCOUNTER
"Request for medication refill:    Medication Name:     lisdexamfetamine (VYVANSE) 30 MG capsule       Providers if patient needs an appointment and you are willing to give a one month supply please refill for one month and  send a MyChart using \".SMILLIMITEDREFILL\" .Or route chart to \"P [facility] \" . To call patient and inform of limited refill and providers request to make an appointment. (Giving one month refill in non controlled medications is strongly recommended before denial)    If refill has been denied, meaning absolutely no refills without visit, please complete the smart phrase \".SMIRXREFUSE\" and route it to the \"P [facility] MED REFILLS\"  pool to inform the patient and the pharmacy.    Troy Del Rio MA     "

## 2025-03-06 RX ORDER — LISDEXAMFETAMINE DIMESYLATE 30 MG/1
30 CAPSULE ORAL DAILY
Qty: 30 CAPSULE | Refills: 0 | Status: SHIPPED | OUTPATIENT
Start: 2025-04-05 | End: 2025-05-05

## 2025-03-06 RX ORDER — LISDEXAMFETAMINE DIMESYLATE 30 MG/1
30 CAPSULE ORAL DAILY
Qty: 30 CAPSULE | Refills: 0 | Status: SHIPPED | OUTPATIENT
Start: 2025-03-06 | End: 2025-04-05

## 2025-03-06 NOTE — CONFIDENTIAL NOTE
Last visit date: 5/17/24  Visit schedule:every 6 months  Next visit due: Now, scheduled for 5/8/25  PDMP Review         Value Time User    State PDMP site checked  Yes 3/6/2025  9:41 AM April Vanessa MD            Last filled 30 day supply on 2/2/25.   Refills to last until 5/8/25 appropriate    Martha was seen today for refill request.    Diagnoses and all orders for this visit:    Attention deficit hyperactivity disorder (ADHD), predominantly inattentive type  -     lisdexamfetamine (VYVANSE) 30 MG capsule; Take 1 capsule (30 mg) by mouth daily.  -     lisdexamfetamine (VYVANSE) 30 MG capsule; Take 1 capsule (30 mg) by mouth daily.         April Vanessa MD

## 2025-03-21 ENCOUNTER — MYC REFILL (OUTPATIENT)
Dept: FAMILY MEDICINE | Facility: CLINIC | Age: 35
End: 2025-03-21
Payer: COMMERCIAL

## 2025-03-21 DIAGNOSIS — F64.0 GENDER DYSPHORIA IN ADULT: ICD-10-CM

## 2025-03-21 NOTE — TELEPHONE ENCOUNTER
"Request for medication refill:    Medication Name: estradiol (ESTRACE) 2 MG tablet    Providers if patient needs an appointment and you are willing to give a one month supply please refill for one month and  send a MyChart using \".SMILLIMITEDREFILL\" .Or route chart to \"P [facility] \" . To call patient and inform of limited refill and providers request to make an appointment. (Giving one month refill in non controlled medications is strongly recommended before denial)    If refill has been denied, meaning absolutely no refills without visit, please complete the smart phrase \".SMIRXREFUSE\" and route it to the \"P [facility] MED REFILLS\"  pool to inform the patient and the pharmacy.    Aaron Puckett, AVILA    "

## 2025-03-24 RX ORDER — ESTRADIOL 2 MG/1
2 TABLET ORAL 2 TIMES DAILY
Qty: 180 TABLET | Refills: 0 | Status: SHIPPED | OUTPATIENT
Start: 2025-03-24

## 2025-05-07 SDOH — HEALTH STABILITY: PHYSICAL HEALTH: ON AVERAGE, HOW MANY DAYS PER WEEK DO YOU ENGAGE IN MODERATE TO STRENUOUS EXERCISE (LIKE A BRISK WALK)?: 1 DAY

## 2025-05-07 SDOH — HEALTH STABILITY: PHYSICAL HEALTH: ON AVERAGE, HOW MANY MINUTES DO YOU ENGAGE IN EXERCISE AT THIS LEVEL?: 20 MIN

## 2025-05-07 ASSESSMENT — ANXIETY QUESTIONNAIRES
3. WORRYING TOO MUCH ABOUT DIFFERENT THINGS: NEARLY EVERY DAY
5. BEING SO RESTLESS THAT IT IS HARD TO SIT STILL: NEARLY EVERY DAY
6. BECOMING EASILY ANNOYED OR IRRITABLE: SEVERAL DAYS
7. FEELING AFRAID AS IF SOMETHING AWFUL MIGHT HAPPEN: MORE THAN HALF THE DAYS
4. TROUBLE RELAXING: MORE THAN HALF THE DAYS
IF YOU CHECKED OFF ANY PROBLEMS ON THIS QUESTIONNAIRE, HOW DIFFICULT HAVE THESE PROBLEMS MADE IT FOR YOU TO DO YOUR WORK, TAKE CARE OF THINGS AT HOME, OR GET ALONG WITH OTHER PEOPLE: SOMEWHAT DIFFICULT
2. NOT BEING ABLE TO STOP OR CONTROL WORRYING: MORE THAN HALF THE DAYS
8. IF YOU CHECKED OFF ANY PROBLEMS, HOW DIFFICULT HAVE THESE MADE IT FOR YOU TO DO YOUR WORK, TAKE CARE OF THINGS AT HOME, OR GET ALONG WITH OTHER PEOPLE?: SOMEWHAT DIFFICULT
1. FEELING NERVOUS, ANXIOUS, OR ON EDGE: NEARLY EVERY DAY
GAD7 TOTAL SCORE: 16
7. FEELING AFRAID AS IF SOMETHING AWFUL MIGHT HAPPEN: MORE THAN HALF THE DAYS

## 2025-05-07 ASSESSMENT — PATIENT HEALTH QUESTIONNAIRE - PHQ9
10. IF YOU CHECKED OFF ANY PROBLEMS, HOW DIFFICULT HAVE THESE PROBLEMS MADE IT FOR YOU TO DO YOUR WORK, TAKE CARE OF THINGS AT HOME, OR GET ALONG WITH OTHER PEOPLE: SOMEWHAT DIFFICULT
SUM OF ALL RESPONSES TO PHQ QUESTIONS 1-9: 8
SUM OF ALL RESPONSES TO PHQ QUESTIONS 1-9: 8

## 2025-05-07 ASSESSMENT — SOCIAL DETERMINANTS OF HEALTH (SDOH): HOW OFTEN DO YOU GET TOGETHER WITH FRIENDS OR RELATIVES?: ONCE A WEEK

## 2025-05-08 ENCOUNTER — OFFICE VISIT (OUTPATIENT)
Dept: FAMILY MEDICINE | Facility: CLINIC | Age: 35
End: 2025-05-08
Payer: COMMERCIAL

## 2025-05-08 VITALS
RESPIRATION RATE: 14 BRPM | HEIGHT: 66 IN | TEMPERATURE: 98.2 F | OXYGEN SATURATION: 96 % | WEIGHT: 160.4 LBS | SYSTOLIC BLOOD PRESSURE: 125 MMHG | HEART RATE: 86 BPM | BODY MASS INDEX: 25.78 KG/M2 | DIASTOLIC BLOOD PRESSURE: 80 MMHG

## 2025-05-08 DIAGNOSIS — F90.0 ATTENTION DEFICIT HYPERACTIVITY DISORDER (ADHD), PREDOMINANTLY INATTENTIVE TYPE: ICD-10-CM

## 2025-05-08 DIAGNOSIS — F64.0 GENDER DYSPHORIA IN ADULT: Primary | ICD-10-CM

## 2025-05-08 DIAGNOSIS — Z00.00 ROUTINE GENERAL MEDICAL EXAMINATION AT A HEALTH CARE FACILITY: ICD-10-CM

## 2025-05-08 RX ORDER — LISDEXAMFETAMINE DIMESYLATE 30 MG/1
30 CAPSULE ORAL DAILY
Qty: 30 CAPSULE | Refills: 0 | Status: CANCELLED | OUTPATIENT
Start: 2025-05-08

## 2025-05-08 RX ORDER — ESTRADIOL VALERATE 20 MG/ML
4 INJECTION INTRAMUSCULAR
Qty: 15 ML | Refills: 3 | Status: SHIPPED | OUTPATIENT
Start: 2025-05-08

## 2025-05-08 RX ORDER — LISDEXAMFETAMINE DIMESYLATE 30 MG/1
30 CAPSULE ORAL EVERY MORNING
Qty: 30 CAPSULE | Refills: 0 | Status: CANCELLED | OUTPATIENT
Start: 2025-05-08

## 2025-05-08 RX ORDER — LISDEXAMFETAMINE DIMESYLATE 30 MG/1
30 CAPSULE ORAL EVERY MORNING
Qty: 30 CAPSULE | Refills: 0 | Status: SHIPPED | OUTPATIENT
Start: 2025-05-08 | End: 2025-06-07

## 2025-05-08 RX ORDER — LISDEXAMFETAMINE DIMESYLATE 30 MG/1
30 CAPSULE ORAL EVERY MORNING
Qty: 30 CAPSULE | Refills: 0 | Status: SHIPPED | OUTPATIENT
Start: 2025-06-07 | End: 2025-07-07

## 2025-05-08 RX ORDER — LISDEXAMFETAMINE DIMESYLATE 30 MG/1
30 CAPSULE ORAL EVERY MORNING
Qty: 30 CAPSULE | Refills: 0 | Status: SHIPPED | OUTPATIENT
Start: 2025-07-07 | End: 2025-08-06

## 2025-05-08 RX ORDER — UBIQUINOL 100 MG
CAPSULE ORAL
Qty: 50 EACH | Status: SHIPPED | OUTPATIENT
Start: 2025-05-08

## 2025-05-08 NOTE — PATIENT INSTRUCTIONS
Based on our discussion, I have outlined the following instructions for you:    - Order your Vyvanse medication for the next three months and have it sent to the [facility] pharmacy.  - Begin using injectable estrogen valerate at a dose of 4 mg. You will need to take this once every seven days.  - Arrange a visit with a nurse to learn how to give yourself the estrogen shots.  - Plan to have your blood tested in three months (ideally at a lab-only visit 1 week before your clinic visit with me). Try not to eat or drink anything but water for eight hours before the test for the best results.  - Keep an eye out for any signs of high estrogen levels, like headaches, gallstones, or blood clots.  - Make sure to take calcium and vitamin D supplements because you aren't getting enough from your food.  - Think about setting up an advanced care directive, which is a plan for your future health care. You have information on how to do this.    Thank you again for your visit, and we look forward to supporting you in your journey to better health.        Self-Injection Made Easy   Patient Education Information Sheet  [facility]    You Will Be Able To:  1. Self inject your medication accurately  2. Receive the injection in your own home    How Do I Get My Medication?  We will enter a prescription for your medication and supplies.   They can be picked up at the pharmacy or mailed to your home by the pharmacy. It may come in a 1 ml or 10ml size.  1ml=1cc  10ml is 2 teaspoons    How Do I Store My Medication?  Testosterone and estrogen can be stored at room temperature.  USP ([place] Pharmacopeia) recommends you dispose of the 10 mL vial after 28 days. 1mL vials are labeled as single use. Many patients keep vials for longer, but how long the medicine stays effective after exposure to air and light is uncertain. Definitely if it's many months old and has been punctured, discard it and get a new vial. A vial  that is unused is stable for at least a year.     Special Note for Testosterone and Estrogen Injections:   Hormones are in an oil which is thick & can be hard to draw into the syringe.   You will receive two needles for each hormone injection.   One needle is larger and used to draw the medication into the syringe (20g)   The other is thinner and used for the injection (25g)    Equipment Needed  1. Medication Vial  2. Syringe  3. Two Needles  4. Alcohol swab or Alcohol and cotton balls  5. Band-aid if needed  6. Sharps Disposal - can use laundry detergent tub      Parts of a Syringe      Intramuscular Injection (IM) overview  The needle passes through the skin and subcutaneous fatty tissue then medication is injected directly into the muscle.     Push the syringe straight in at a 90 degree angle to your skin, all the way to the hub.   Then depress the plunger, waiting a few seconds for the medication to enter the muscle.   Then remove the syringe and dispose of it.      Site for IM Injection (Thigh)        Site for IM Injection (Hip)            Steps for Intramuscular Injection  Gather Supplies (Double check for correct medication and dosage).  Wash hands  Choose a site for the injection. Sites include: thigh or buttock  Draw up medication into syringe (don t forget to push out excess air with plunger). You will have two needles. Use the larger needle to draw up the medicine. Then, change the needle to the thinner one for the injection. Carefully recap needle and set syringe aside.  Wipe injection site with alcohol swab  Relax the muscle. Pinch a piece of skin and tissue to inject into  Dart the needle through the skin at a 90 degree angle  Take hold of syringe with free hand to stabilize  Carefully pull back on the plunger slightly (aspirate) and check for blood in the syringe  If there is no blood, push plunger to inject medication  If there is blood, remove the needle, throw away and start over  After injecting  "the medication, remove the needle and place in sharps container  Use a band-aid to cover the site if desired    Intramuscular Injection Safety Tips  1. Double check for correct medication and dosage  2. Dart the needle in quickly and to the hub  3. Remember to aspirate (remove needle if you see blood, discard and start over)  4. Dispose of  used needle and syringe right away in proper sharps container        Subcutaneous (\"Sub-Q\") Injection   The needle passes through the skin and into the fatty tissue underneath the skin. The needle does not pass into the muscle.     Overview of steps (see below for detailed instructions):  Draw up medication  Wipe skin with alcohol pad  Hold syringe like a dart  Pinch skin to inject into  Push the syringe straight in at a 45-90 degree angle to your skin, all the way to the hub.   Then depress the plunger, waiting a few seconds for the medication to enter the muscle.   Then remove the syringe and dispose of it.      Sub-q injection sites      Image source: Sensor Tower Steps to Self-Injecting HRT/GAHT for IM and SubQ Injections ([employer]xhealth.com)        Detailed Steps for Subcutaneous Injection  Gather Supplies (Double check for correct medication and dosage).  Wash hands  Choose a site for the injection. Sites include: stomach, front of thighs, side of upper arm  Draw up medication into syringe (don t forget to push out excess air with plunger). You will have two needles. Use the larger needle to draw up the medicine. Then, change the needle to the thinner one for the injection. Carefully recap needle and set syringe aside.  Wipe injection site with alcohol swab  Relax your body. Pinch a piece of skin and tissue to inject into  Hold the needle like a dart, then dart the needle through the skin at a 45 degree angle  Take hold of syringe with free hand to stabilize  Carefully pull back on the plunger slightly (aspirate) and check for blood in the syringe  If there is no blood, push " plunger to inject medication  If there is blood, remove the needle, throw away and start over  After injecting the medication, remove the needle and place in sharps container  Use a band-aid to cover the site if desired     You Can Do It!  Once you learn the skills, you will be a pro in no time!  If you are having trouble, consider coming to clinic, or going to the shot clinic at [facility], [address] [place], [phone]. You will need to bring your supplies, medication, and an ID.  [email or web address]    Images from: [email or web address]    Examples   0.2 mL :          0.15 mL:       Patient Education   Preventive Care Advice   This is general advice given by our system to help you stay healthy. However, your care team may have specific advice just for you. Please talk to your care team about your preventive care needs.  Nutrition  Eat 5 or more servings of fruits and vegetables each day.  Try wheat bread, brown rice and whole grain pasta (instead of white bread, rice, and pasta).  Get enough calcium and vitamin D. Check the label on foods and aim for 100% of the RDA (recommended daily allowance).  Lifestyle  Exercise at least 150 minutes each week  (30 minutes a day, 5 days a week).  Do muscle strengthening activities 2 days a week. These help control your weight and prevent disease.  No smoking.  Wear sunscreen to prevent skin cancer.  Have a dental exam and cleaning every 6 months.  Yearly exams  See your health care team every year to talk about:  Any changes in your health.  Any medicines your care team has prescribed.  Preventive care, family planning, and ways to prevent chronic diseases.  Shots (vaccines)   HPV shots (up to age 26), if you've never had them before.  Hepatitis B shots (up to age 59), if you've never had them before.  COVID-19 shot: Get this shot when it's due.  Flu shot: Get a flu shot every year.  Tetanus shot: Get a tetanus  shot every 10 years.  Pneumococcal, hepatitis A, and RSV shots: Ask your care team if you need these based on your risk.  Shingles shot (for age 50 and up)  General health tests  Diabetes screening:  Starting at age 35, Get screened for diabetes at least every 3 years.  If you are younger than age 35, ask your care team if you should be screened for diabetes.  Cholesterol test: At age 39, start having a cholesterol test every 5 years, or more often if advised.  Bone density scan (DEXA): At age 50, ask your care team if you should have this scan for osteoporosis (brittle bones).  Hepatitis C: Get tested at least once in your life.  STIs (sexually transmitted infections)  Before age 24: Ask your care team if you should be screened for STIs.  After age 24: Get screened for STIs if you're at risk. You are at risk for STIs (including HIV) if:  You are sexually active with more than one person.  You don't use condoms every time.  You or a partner was diagnosed with a sexually transmitted infection.  If you are at risk for HIV, ask about PrEP medicine to prevent HIV.  Get tested for HIV at least once in your life, whether you are at risk for HIV or not.  Cancer screening tests  Cervical cancer screening: If you have a cervix, begin getting regular cervical cancer screening tests starting at age 21.  Breast cancer scan (mammogram): If you've ever had breasts, begin having regular mammograms starting at age 40. This is a scan to check for breast cancer.  Colon cancer screening: It is important to start screening for colon cancer at age 45.  Have a colonoscopy test every 10 years (or more often if you're at risk) Or, ask your provider about stool tests like a FIT test every year or Cologuard test every 3 years.  To learn more about your testing options, visit:   .  For help making a decision, visit:   https://bit.ly/go66646.  Prostate cancer screening test: If you have a prostate, ask your care team if a prostate cancer  screening test (PSA) at age 55 is right for you.  Lung cancer screening: If you are a current or former smoker ages 50 to 80, ask your care team if ongoing lung cancer screenings are right for you.  For informational purposes only. Not to replace the advice of your health care provider. Copyright   2023 [facility] [employer]. All rights reserved. Clinically reviewed by the [facility]. [employer] [id number] - REV 01/24.  Learning About Stress  What is stress?     Stress is your body's response to a hard situation. Your body can have a physical, emotional, or mental response. Stress is a fact of life for most people, and it affects everyone differently. What causes stress for you may not be stressful for someone else.  A lot of things can cause stress. You may feel stress when you go on a job interview, take a test, or run a race. This kind of short-term stress is normal and even useful. It can help you if you need to work hard or react quickly. For example, stress can help you finish an important job on time.  Long-term stress is caused by ongoing stressful situations or events. Examples of long-term stress include long-term health problems, ongoing problems at work, or conflicts in your family. Long-term stress can harm your health.  How does stress affect your health?  When you are stressed, your body responds as though you are in danger. It makes hormones that speed up your heart, make you breathe faster, and give you a burst of energy. This is called the fight-or-flight stress response. If the stress is over quickly, your body goes back to normal and no harm is done.  But if stress happens too often or lasts too long, it can have bad effects. Long-term stress can make you more likely to get sick, and it can make symptoms of some diseases worse. If you tense up when you are stressed, you may develop neck, shoulder, or low back pain. Stress is linked to high blood pressure and heart  disease.  Stress also harms your emotional health. It can make you jackson, tense, or depressed. Your relationships may suffer, and you may not do well at work or school.  What can you do to manage stress?  You can try these things to help manage stress:   Do something active. Exercise or activity can help reduce stress. Walking is a great way to get started. Even everyday activities such as housecleaning or yard work can help.  Try yoga or nikko chi. These techniques combine exercise and meditation. You may need some training at first to learn them.  Do something you enjoy. For example, listen to music or go to a movie. Practice your hobby or do volunteer work.  Meditate. This can help you relax, because you are not worrying about what happened before or what may happen in the future.  Do guided imagery. Imagine yourself in any setting that helps you feel calm. You can use online videos, books, or a teacher to guide you.  Do breathing exercises. For example:  From a standing position, bend forward from the waist with your knees slightly bent. Let your arms dangle close to the floor.  Breathe in slowly and deeply as you return to a standing position. Roll up slowly and lift your head last.  Hold your breath for just a few seconds in the standing position.  Breathe out slowly and bend forward from the waist.  Let your feelings out. Talk, laugh, cry, and express anger when you need to. Talking with supportive friends or family, a counselor, or a peter leader about your feelings is a healthy way to relieve stress. Avoid discussing your feelings with people who make you feel worse.  Write. It may help to write about things that are bothering you. This helps you find out how much stress you feel and what is causing it. When you know this, you can find better ways to cope.  What can you do to prevent stress?  You might try some of these things to help prevent stress:  Manage your time. This helps you find time to do the  "things you want and need to do.  Get enough sleep. Your body recovers from the stresses of the day while you are sleeping.  Get support. Your family, friends, and community can make a difference in how you experience stress.  Limit your news feed. Avoid or limit time on social media or news that may make you feel stressed.  Do something active. Exercise or activity can help reduce stress. Walking is a great way to get started.  Where can you learn more?  Go to https://www.[employer].net/patiented  Enter N032 in the search box to learn more about \"Learning About Stress.\"  Current as of: October 24, 2024  Content Version: 14.4    6429-9660 [employer].   Care instructions adapted under license by your healthcare professional. If you have questions about a medical condition or this instruction, always ask your healthcare professional. [employer] disclaims any warranty or liability for your use of this information.    Learning About Depression Screening  What is depression screening?  Depression screening is a way to see if you have depression symptoms. It may be done by a doctor or counselor. It's often part of a routine checkup. That's because your mental health is just as important as your physical health.  Depression is a mental health condition that affects how you feel, think, and act. You may:  Have less energy.  Lose interest in your daily activities.  Feel sad and grouchy for a long time.  Depression is very common. It affects people of all ages.  Many things can lead to depression. Some people become depressed after they have a stroke or find out they have a major illness like cancer or heart disease. The death of a loved one or a breakup may lead to depression. It can run in families. Most experts believe that a combination of inherited genes and stressful life events can cause it.  What happens during screening?  You may be asked to fill out a form about your depression symptoms. You " "and the doctor will discuss your answers. The doctor may ask you more questions to learn more about how you think, act, and feel.  What happens after screening?  If you have symptoms of depression, your doctor will talk to you about your options.  Doctors usually treat depression with medicines or counseling. Often, combining the two works best. Many people don't get help because they think that they'll get over the depression on their own. But people with depression may not get better unless they get treatment.  The cause of depression is not well understood. There may be many factors involved. But if you have depression, it's not your fault.  A serious symptom of depression is thinking about death or suicide. If you or someone you care about talks about this or about feeling hopeless, get help right away.  It's important to know that depression can be treated. Medicine, counseling, and self-care may help.  Where can you learn more?  Go to https://www.[employer].net/patiented  Enter T185 in the search box to learn more about \"Learning About Depression Screening.\"  Current as of: July 31, 2024  Content Version: 14.4    9135-8265 [employer].   Care instructions adapted under license by your healthcare professional. If you have questions about a medical condition or this instruction, always ask your healthcare professional. [employer] disclaims any warranty or liability for your use of this information.       "

## 2025-05-08 NOTE — PROGRESS NOTES
"Preventive Care Visit  [facility]  April Vanessa MD, Family Medicine  May 8, 2025      Assessment & Plan     Attention deficit hyperactivity disorder (ADHD), predominantly inattentive type:  - ADHD is being managed with Vyvanse, which is working well at the current dose.  - Reorder Vyvanse for three months, to be sent to the [facility].    Gender dysphoria in adult:  - Current hormone replacement therapy (HRT) with sublingual 2mg BID administration is effective, but patient is interested in switching to injectable form for convenience.  - Start injectable estrogen valerate at 4 mg, with administration every seven days. Schedule a nurse shot teaching visit for instruction on self-administration. Check labs in three months, ideally fasting for eight hours prior.  - Risks and side effects: Monitor for potential high estrogen levels, which could lead to migraines, gallstones, or blood clots.    Routine general medical examination at a health care facility:  - General health maintenance and preventive care discussed.  - Recommend calcium and vitamin D supplementation due to insufficient dietary intake. Discussed the importance of an advanced care directive and provided information on how to complete it. No mammogram needed at this time due to age and duration on estrogen.    Consent was obtained from the patient to use an AI documentation tool in the creation of this note.    Martha was seen today for physical.    Diagnoses and all orders for this visit:    Gender dysphoria in adult  -     estradiol valerate (DELESTROGEN) 20 MG/ML injection; Inject 0.2 mLs (4 mg) into the muscle every 7 days. Dispense three 5-ml multiuse (28d) vials every 3 months  -     Testosterone total; Future  -     Estradiol; Future  -     Hemoglobin; Future  -     Lipid Profile; Future  -     Glucose; Future  -     Needle, Disp, 25G X 5/8\" MISC; Use once weekly to administer hormone  -     Needle, Disp, 20G X 1-1/4\" " "MISC; Use to draw up hormone once weekly  -     Alcohol Swabs (ALCOHOL PREP) 70 % PADS; Use 1-2 pads for hormone injection site preparation weekly  -     syringe, disposable, 1 ML MISC; Use for administering hormone subcutaneous once weekly    Attention deficit hyperactivity disorder (ADHD), predominantly inattentive type  -     lisdexamfetamine (VYVANSE) 30 MG capsule; Take 1 capsule (30 mg) by mouth every morning.  -     lisdexamfetamine (VYVANSE) 30 MG capsule; Take 1 capsule (30 mg) by mouth every morning.  -     lisdexamfetamine (VYVANSE) 30 MG capsule; Take 1 capsule (30 mg) by mouth every morning.    Routine general medical examination at a health care facility         BMI  Estimated body mass index is 25.89 kg/m  as calculated from the following:    Height as of this encounter: 1.676 m (5' 6\").    Weight as of this encounter: 72.8 kg (160 lb 6.4 oz).       Counseling  Appropriate preventive services were addressed with this patient via screening, questionnaire, or discussion as appropriate for fall prevention, nutrition, physical activity, Tobacco-use cessation, social engagement, weight loss and cognition.  Checklist reviewing preventive services available has been given to the patient.  Reviewed patient's diet, addressing concerns and/or questions.   She is at risk for lack of exercise and has been provided with information to increase physical activity for the benefit of her well-being.   She is at risk for psychosocial distress and has been provided with information to reduce risk.   The patient's PHQ-9 score is consistent with mild depression. She was provided with information regarding depression.           Andrea Thomas is a 34 year old, presenting for the following:  Physical        5/8/2025     3:34 PM   Additional Questions   Roomed by Adriano         5/8/2025    Information    services provided? No          HPI  Martha ELAINA Rangel, 34 years old, female    - Here for physical " and hormone check-in.    Anxiety  - Anxiety higher than it was on October 4th.  - Currently on sertraline 150 mg for anxiety and has 3 therapists, has tried dialy meditation but doesn't get past a few days. Not interested in adding anti-anxiety meds at this point      GAHT  3/24/23 Started hormones/spir  2023 Stopped spironolactone d/t brain fog  3/14/24 TT=18 ,Qeibicfpg=192, K=4.7  06/03/24: Orchiectomy  Current Regimen: 2mg BID PO estradiol  Last Labs: 9/18/24 Estradiol 398, TT=17  Rec'd recheck E level (not done)  - Considering switching to injectable hormone replacement therapy (HRT) for convenience.  - Current sublingual HRT working fine, no symptoms like migraines, gallstones, or blood clots despite high levels over 300.    Advance Care Planning    Discussed advance care planning with patient; informed AVS has link to [employer].        5/7/2025   General Health   How would you rate your overall physical health? Good   Feel stress (tense, anxious, or unable to sleep) Very much   (!) STRESS CONCERN      5/7/2025   Nutrition   Three or more servings of calcium each day? (!) I DON'T KNOW   Diet: Regular (no restrictions)   How many servings of fruit and vegetables per day? (!) 2-3   How many sweetened beverages each day? 0-1         5/7/2025   Exercise   Days per week of moderate/strenous exercise 1 day   Average minutes spent exercising at this level 20 min   (!) EXERCISE CONCERN      5/7/2025   Social Factors   Frequency of gathering with friends or relatives Once a week   Worry food won't last until get money to buy more No   Food not last or not have enough money for food? No   Do you have housing? (Housing is defined as stable permanent housing and does not include staying outside in a car, in a tent, in an abandoned building, in an overnight shelter, or couch-surfing.) Yes   Are you worried about losing your housing? No   Lack of transportation? No   Unable to get utilities (heat,electricity)? No  "        5/7/2025   Dental   Dentist two times every year? Yes       Today's PHQ-9 Score:       5/7/2025     9:16 AM   PHQ-9 SCORE   PHQ-9 Total Score MyChart 8 (Mild depression)   PHQ-9 Total Score 8        Patient-reported         5/7/2025   Substance Use   Alcohol more than 3/day or more than 7/wk No   Do you use any other substances recreationally? No     Social History     Tobacco Use    Smoking status: Never    Smokeless tobacco: Never   Vaping Use    Vaping status: Never Used   Substance Use Topics    Alcohol use: Yes     Comment: occ.    Drug use: Never          Will need mammograms once been on estrogen for 5 years and age 40-50        5/7/2025   STI Screening   New sexual partner(s) since last STI/HIV test? No              5/7/2025   Contraception/Family Planning   Questions about contraception or family planning No        Reviewed and updated as needed this visit by Provider   Tobacco  Allergies  Meds  Problems  Med Hx  Surg Hx  Fam Hx               Objective    Exam  /80   Pulse 86   Temp 98.2  F (36.8  C) (Temporal)   Resp 14   Ht 1.676 m (5' 6\")   Wt 72.8 kg (160 lb 6.4 oz)   SpO2 96%   BMI 25.89 kg/m     Estimated body mass index is 25.89 kg/m  as calculated from the following:    Height as of this encounter: 1.676 m (5' 6\").    Weight as of this encounter: 72.8 kg (160 lb 6.4 oz).    Physical Exam  Vitals reviewed.   Constitutional:       General: She is not in acute distress.     Appearance: She is well-developed. She is not diaphoretic.   HENT:      Head: Normocephalic and atraumatic.   Cardiovascular:      Rate and Rhythm: Normal rate and regular rhythm.      Heart sounds: Normal heart sounds. No murmur heard.  Pulmonary:      Effort: Pulmonary effort is normal. No respiratory distress.      Breath sounds: Normal breath sounds. No wheezing.   Abdominal:      General: Bowel sounds are normal. There is no distension.      Palpations: Abdomen is soft.      Tenderness: There is no " abdominal tenderness.   Musculoskeletal:         General: Normal range of motion.   Skin:     General: Skin is warm and dry.      Findings: No erythema or rash.   Neurological:      Mental Status: She is alert.   Psychiatric:         Behavior: Behavior normal.         Thought Content: Thought content normal.         Judgment: Judgment normal.               Signed Electronically by: April Vanessa MD    Answers submitted by the patient for this visit:  Patient Health Questionnaire (Submitted on 5/7/2025)  If you checked off any problems, how difficult have these problems made it for you to do your work, take care of things at home, or get along with other people?: Somewhat difficult  PHQ9 TOTAL SCORE: 8  Patient Health Questionnaire (G7) (Submitted on 5/7/2025)  FERNANDO 7 TOTAL SCORE: 16          6/26/2023     3:02 PM 10/4/2023     4:18 PM 5/7/2025     9:16 AM   FERNANDO-7 SCORE   Total Score  6 (mild anxiety) 16 (severe anxiety)   Total Score 8 6 16        Patient-reported         The longitudinal plan of care for the diagnosis(es)/condition(s) as documented were addressed during this visit. Due to the added complexity in care, I will continue to support Martha in the subsequent management and with ongoing continuity of care.

## 2025-05-13 DIAGNOSIS — F64.0 GENDER DYSPHORIA IN ADULT: ICD-10-CM

## 2025-05-13 RX ORDER — UBIQUINOL 100 MG
CAPSULE ORAL
Qty: 50 EACH | OUTPATIENT
Start: 2025-05-13

## 2025-05-13 NOTE — TELEPHONE ENCOUNTER
Sent 5/8/25    Alcohol Swabs (ALCOHOL PREP) 70 % PADS 50 each PRN 5/8/2025       Estefanía Gomez RN

## 2025-06-03 ENCOUNTER — MYC REFILL (OUTPATIENT)
Dept: FAMILY MEDICINE | Facility: CLINIC | Age: 35
End: 2025-06-03
Payer: COMMERCIAL

## 2025-06-03 DIAGNOSIS — F41.9 ANXIETY: ICD-10-CM

## 2025-06-04 NOTE — TELEPHONE ENCOUNTER
"Request for medication refill:    Medication Name:       sertraline (ZOLOFT) 100 MG tablet       Providers if patient needs an appointment and you are willing to give a one month supply please refill for one month and  send a MyChart using \".SMILLIMITEDREFILL\" .Or route chart to \"[facility] \" . To call patient and inform of limited refill and providers request to make an appointment. (Giving one month refill in non controlled medications is strongly recommended before denial)    If refill has been denied, meaning absolutely no refills without visit, please complete the smart phrase \".SMIRXREFUSE\" and route it to the \"[facility] MED REFILLS\"  pool to inform the patient and the pharmacy.    Nta Steward MA     "

## 2025-06-05 RX ORDER — SERTRALINE HYDROCHLORIDE 100 MG/1
150 TABLET, FILM COATED ORAL DAILY
Qty: 135 TABLET | Refills: 3 | Status: SHIPPED | OUTPATIENT
Start: 2025-06-05

## 2025-06-09 ENCOUNTER — OFFICE VISIT (OUTPATIENT)
Dept: OTOLARYNGOLOGY | Facility: CLINIC | Age: 35
End: 2025-06-09
Payer: COMMERCIAL

## 2025-06-09 DIAGNOSIS — R49.9 VOICE AND RESONANCE DISORDER: ICD-10-CM

## 2025-06-09 DIAGNOSIS — R49.0 DYSPHONIA: Primary | ICD-10-CM

## 2025-06-09 NOTE — PROGRESS NOTES
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  Laryngeal function studies show significant increase in trans-glottal airflow compared to age and gender matched norms, increased inferred subglottal pressure, and significantly elevated AVQI. Ms. Rangel demonstrated good learning of early therapeutic activities.       SUBJECTIVE:  Since Martha's last session, she reports the following:   She reports that symptoms have improved overall since her last visit.  She has had less success using a congruent voice consistently lately.  She can find a congruent voice, but just isn't use it.  She wrote down a few different character voices (and characteristics) to practice:  Dog voice   Creepy person - nasal, breathy  Evil voice - darker  Heroic and boisterous voice - deep, full, dark, ring  Doofy/Goofy voice - nasal, drawn out, darker, articulation changes  Primary patient goal: Working on \"character voices\" with a congruent voice; Recover from illness    OBJECTIVE:  Ms. Rangel presents today with the following:  Voice Quality:  Speaking voice:  Roughness: WNL  Breathiness: WNL  Strain: WNL  Pitch: WNL  Loudness: WNL  F0/ Habitual pitch: G3-A3  Throat Issues:  Cough:  None reported today  Throat Clearing: Occasionally observed today.  Globus Sensation: None reported today.  Throat Pain/Discomfort: None reported today.    Patient Specific Goal Metrics:      12/18/2024     9:00 AM 1/15/2025    10:00 AM 2/14/2025     9:00 AM   Dysponia SLP Goals   How would you rate your speaking voice quality, " "if 0 is worst voice quality, and 10 is best voice?  4 5   How much effort is it to speak, if 0 is no extra effort and 10 is maximum effort? 2 2 3   How well does your voice align with your identity, where 0 is \"my voice doesn't align at all\", and 10 is \"my voice aligns completely\"? 4 4 7   How severe is your cough /throat clearing, if 0 is no cough at all and 10 is the worst cough? 7 8 7   How much does your voice problem bother you? Quite a bit Quite a bit Quite a bit   How much does your cough/throat-clearing problem bother you?              Quite a bit     [place] Gender Presentation Scale for Communication (U-GPS)    The U-GPS (Laci, Gwyn, & Willy, 2024) is a validated measure of voice-related gender incongruence.  It provides an opportunity for clients to rate their own voice and vocal targets based on 10 parameters and on a 9-point gender spectrum continuum.  It allows clients an opportunity to express which areas are important to them and to identify their own personal set of vocal goals.  For a trans female individual, a meaningful change in incongruence score is 9.35 (0.5 x SD).    Completed 2/14/2025    Characteristic/Quality Current Goal   Pitch 6 7   Intonation 5 8   Resonance 6 8   Loudness 6 7   Speech Smoothness 7 9   Speech Clarity 6 8   Word Choice 6 8   Facial Expression 7 9   Gesture 8 8   Posture 8 8     Current Score   (Sum of Current Ratings) 65   Goal Score  (Sum of Goal Ratings) 80     Incongruence Score  (Sum of Differences) 15     Completed 12/18/2024    Characteristic/Quality Current Goal   Pitch 8 8   Intonation 4 8   Resonance 6 9   Loudness 6 7   Speech Smoothness 4 8   Speech Clarity 7 7   Word Choice 5 7   Facial Expression 5 7   Gesture 6 7   Posture 6 7     Current Score   (Sum of Current Ratings) 51   Goal Score  (Sum of Goal Ratings) 75     Incongruence Score  (Sum of Differences) 16     Note: Feels like they have improved toward their goals with speech smoothness, but speech " smoothness and intonation are the most important characteristics to change.      THERAPEUTIC ACTIVITIES  Today Martha participated in the following therapeutic activities:  Counseling and Education:  Asked  questions about the nature of her symptoms, and I answered all of these thoroughly.  Provided education about the nonlinear source-filter theory of voice production per patient request.    I provided Martha with explanation and skilled instruction of:    The patient was instructed on the use of conversational training therapy (CTT) to improve respiratory-phonatory coordination, to improve voice quality, and to increase generalization to conversational speech.  The patient used CTT with a focus on nasalance, variable phonation types, and anterior resonance at the conversational-level with minimal verbal, modeling, kinesthetic, phonatory, and resonatory cues.  The patient completed this task for approximately 40 minutes.  The patient used the kinesthetic cue of nares occlusion to facilitate increased nasalance, increased vocal loudness, and anterior resonance.  The patient was instructed on the use of nares occlusion to increase nasalance, to increase vocal loudness, and to increase anterior resonance.  The patient used /sh/ loaded phonemes at the word-, phrase-, and sentence-level with minimal kinesthetic, verbal, modeling, and resonatory cues.    The following exercises have been completed in the past and remain a component of the patient's HEP:    The patient learned the use of cricothyroid dominant voice production (CTP) to improve respiratory-phonatory coordination, to improve control of vocal registers, and to increase use of flow phonation in speech.  The patient completed a 5-1 glide from G4 to G3  as a preparatory gesture for speech at the phrase-level with minimal verbal and modeling cues.    Instruction of Home Practice:  A revised home exercise plan (HEP) was collaboratively designed and instructed.   The patient confirmed that the HEP was reasonable and that it seemed attainable for daily practice.  Patient identified video hitesh as an activity that would be helpful to practice.  Overwatch  [employer]  Back for Blood  Risk of Rain 2  I provided an AVS of today's therapeutic activities to facilitate practice.    ASSESSMENT/PLAN  Progress toward long-term goals:  Adequate but incomplete progress; please see above    Impressions:  IMPRESSIONS:    Ms. 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 Martha had a productive session of therapy today working on generalization tasks in the context of CTT as well as increased nasalance within a linguistic hierarchy. While Cierra demonstrates a highly skilled ability to vary vocal characteristics to increase vocal congruence volitionally, she expressed that she finds it difficult to use a congruent voice in accepting environments (such as with friends and family). We planned for her to attempt to generalize more frequently and in different situations, and she was agreeable to this notion. She will continue to work on her exercises on a daily basis, and work on incorporating the techniques into her daily activities. Speech therapy continues to be medically necessary for Martha to participate fully and engage in activities of daily living.     Plan:   Cierra's care will be transferred to my colleague, Mayuri Barton. Cierra has requested 2 more appointments space 2-3 months apart.    For home practice goals, see AVS.     TOTAL SERVICE TIME: 57 minutes    CPT Billing Codes:   TREATMENT OF SPEECH, LANGUAGE, VOICE, COMMUNICATION, and/or AUDITORY PROCESSING DISORDER (57822)    Thank you for allowing me to participate in this patient's care,    Chester Valdes M.M., Ph.D., CF-SLP  Clinical Fellow in Voice and Upper Airway Speech-Language Pathology  [facility]  "[facility] - [facility] [email or web address] [email or web address]  Pronouns: he/him    OBJECTIVE FINDINGS  PATIENT REPORTED MEASURES  Patient Supplied Answers To Last 2 VHI Questionnaires      4/10/2025     4:10 PM 6/9/2025     9:11 AM   Voice Handicap Index (VHI-10)   My voice makes it difficult for people to hear me 2 2   People have difficulty understanding me in a noisy room 2 2   My voice difficulties restrict my personal and social life.  0 0   I feel left out of conversations because of my voice 0 0   My voice problem causes me to lose income 0 0   I feel as though I have to strain to produce voice 2 2   The clarity of my voice is unpredictable 2 2   My voice problem upsets me 2 2   My voice makes me feel handicapped 2 2   People ask, \"What's wrong with your voice?\" 0 0   VHI-10 12  12        Patient-reported          Patient Supplied Answers To Last 2 CSI Questionnaires      4/10/2025     4:10 PM 6/9/2025     9:11 AM   Cough Severity Index (CSI)   My cough is worse when I lie down 0 0   My coughing problem causes me to restrict my personal and social life 0 0   I tend to avoid places because of my cough problem 0 0   I feel embarrassed because of my coughing problem 0 0   People ask, ''What's wrong?'' because I cough a lot 0 0   I run out of air when I cough 0 0   My coughing problem affects my voice 2 2   My coughing problem limits my physical activity 0 0   My coughing problem upsets me 1 1   People ask me if I am sick because I cough a lot 0 0   CSI Score 3  3        Patient-reported          Patient Supplied Answers To Last 2 EAT Questionnaires       No data to display                  Patient Supplied Answers to Dyspnea Index Questionnaire:       No data to display                 I attest that the documentation and services performed were provided by Chester Valdes, LORENA PhD CF-SLP, were reviewed and completed under my supervision.     Светлана Welch MS CCC-SLP  Speech-Language Pathologist  [facility]  Department of Otolaryngology - Head and Neck Surgery  [facility]  [email or web address]  Direct: [phone]  [phone]

## 2025-06-09 NOTE — PATIENT INSTRUCTIONS
"When practicing these stimuli, try pinching your nose and feeling a buzzy sensation in your nose while speaking. Then, take the pinch off and see if you can keep a bright, high-feeling voice.    Level 1-Syllables:    Shoe  She  Show  Sebastian Guidry (this one might be hard, skip it and go back to Shoe if this one doesn't feel fluid)    Level 2-Words:    Shoe  Shy  Shirt  Short  Shut  Share  Shed    Shine  Sharp    Level 3-Two syllable words:    Shoelace  Shiver    Sharing  Sharpen  Shelter  Shopping  Shampoo  Chevy  Shallow    Level 4-Phrases:    shake hands  sharp thorn  shop for food  shovel snow  shy girl  hair shampoo  nice to share  make shine  hiking shoe  sore shoulder    Level 5-Sentences:    The  made the best pasta.  She bought a cut, color, and shampoo.  She is nice to share her ice cream.  She washes her hair in the shower.  She is shy around new people.  The sheep is standing in the grass.  Be careful the thorn is sharp.  He wore his gray shirt.  I need to shop for groceries.  The doctor needs to give you a shot.    Level 6-Paragraph:    [place] has some of the best  in the [place]. Alida was one of those . Alida was the  at \"[employer] a very expensive restaurant.    Like many people, she liked to shop, wash her car, and sip lemonade in the shade, but unlike many people, she was an accomplished . She had been a  for over 15 years. Growing up, she loved to cook. She experimented with different combinations of ingredients to see how they would taste.    Many people thought this was childish, but even as a young girl, Alida made food dishes that astonished her friends and family. Her biggest secret was that she only used fresh ingredients. When it came to quality, Alida never took shortcuts.    She hand selected every ingredient and paid special attention to how they smelled in the store before she bought them. She was always cautious to avoid discounted " "ingredients because she knew they wouldn't taste right.    Another one of her secrets was that she had excellent communication and cooperation with her staff. She was only one person, and she needed people who would listen and follow her directions. Alida loved what she did. She was an amazing .    Even though she loved working at \"[employer] she wanted to open her own restaurant some day. She looked forward to the day that she would own her own restaurant.        "

## 2025-06-09 NOTE — LETTER
"6/9/2025       RE: Martha Rangel  [address]  [facility] [address]     Dear Colleague,    Thank you for referring your patient, Martha Rangel, to the [facility] [place] at [facility]. Please see a copy of my visit note below.    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  Laryngeal function studies show significant increase in trans-glottal airflow compared to age and gender matched norms, increased inferred subglottal pressure, and significantly elevated AVQI. Ms. Rangel demonstrated good learning of early therapeutic activities.       SUBJECTIVE:  Since Martha's last session, she reports the following:   She reports that symptoms have improved overall since her last visit.  She has had less success using a congruent voice consistently lately.  She can find a congruent voice, but just isn't use it.  She wrote down a few different character voices (and characteristics) to practice:  Dog voice   Creepy person - nasal, breathy  Evil voice - darker  Heroic and boisterous voice - deep, full, dark, ring  Doofy/Goofy voice - nasal, drawn out, darker, articulation changes  Primary patient goal: Working on \"character voices\" with a congruent voice; Recover from illness    OBJECTIVE:  Ms. Rangel presents today with the following:  Voice Quality:  Speaking voice:  Roughness: WNL  Breathiness: WNL  Strain: WNL  Pitch: WNL  Loudness: WNL  F0/ Habitual pitch: G3-A3  Throat Issues:  Cough:  None reported " "today  Throat Clearing: Occasionally observed today.  Globus Sensation: None reported today.  Throat Pain/Discomfort: None reported today.    Patient Specific Goal Metrics:      12/18/2024     9:00 AM 1/15/2025    10:00 AM 2/14/2025     9:00 AM   Dysponia SLP Goals   How would you rate your speaking voice quality, if 0 is worst voice quality, and 10 is best voice?  4 5   How much effort is it to speak, if 0 is no extra effort and 10 is maximum effort? 2 2 3   How well does your voice align with your identity, where 0 is \"my voice doesn't align at all\", and 10 is \"my voice aligns completely\"? 4 4 7   How severe is your cough /throat clearing, if 0 is no cough at all and 10 is the worst cough? 7 8 7   How much does your voice problem bother you? Quite a bit Quite a bit Quite a bit   How much does your cough/throat-clearing problem bother you?              Quite a bit     [place] Gender Presentation Scale for Communication (U-GPS)    The U-GPS (Gwyn Aguero & Willy, 2024) is a validated measure of voice-related gender incongruence.  It provides an opportunity for clients to rate their own voice and vocal targets based on 10 parameters and on a 9-point gender spectrum continuum.  It allows clients an opportunity to express which areas are important to them and to identify their own personal set of vocal goals.  For a trans female individual, a meaningful change in incongruence score is 9.35 (0.5 x SD).    Completed 2/14/2025    Characteristic/Quality Current Goal   Pitch 6 7   Intonation 5 8   Resonance 6 8   Loudness 6 7   Speech Smoothness 7 9   Speech Clarity 6 8   Word Choice 6 8   Facial Expression 7 9   Gesture 8 8   Posture 8 8     Current Score   (Sum of Current Ratings) 65   Goal Score  (Sum of Goal Ratings) 80     Incongruence Score  (Sum of Differences) 15     Completed 12/18/2024    Characteristic/Quality Current Goal   Pitch 8 8   Intonation 4 8   Resonance 6 9   Loudness 6 7   Speech Smoothness 4 8   Speech " Clarity 7 7   Word Choice 5 7   Facial Expression 5 7   Gesture 6 7   Posture 6 7     Current Score   (Sum of Current Ratings) 51   Goal Score  (Sum of Goal Ratings) 75     Incongruence Score  (Sum of Differences) 16     Note: Feels like they have improved toward their goals with speech smoothness, but speech smoothness and intonation are the most important characteristics to change.      THERAPEUTIC ACTIVITIES  Today Martha participated in the following therapeutic activities:  Counseling and Education:  Asked  questions about the nature of her symptoms, and I answered all of these thoroughly.  Provided education about the nonlinear source-filter theory of voice production per patient request.    I provided Martha with explanation and skilled instruction of:    The patient was instructed on the use of conversational training therapy (CTT) to improve respiratory-phonatory coordination, to improve voice quality, and to increase generalization to conversational speech.  The patient used CTT with a focus on nasalance, variable phonation types, and anterior resonance at the conversational-level with minimal verbal, modeling, kinesthetic, phonatory, and resonatory cues.  The patient completed this task for approximately 40 minutes.  The patient used the kinesthetic cue of nares occlusion to facilitate increased nasalance, increased vocal loudness, and anterior resonance.  The patient was instructed on the use of nares occlusion to increase nasalance, to increase vocal loudness, and to increase anterior resonance.  The patient used /sh/ loaded phonemes at the word-, phrase-, and sentence-level with minimal kinesthetic, verbal, modeling, and resonatory cues.    The following exercises have been completed in the past and remain a component of the patient's HEP:    The patient learned the use of cricothyroid dominant voice production (CTP) to improve respiratory-phonatory coordination, to improve control of vocal  registers, and to increase use of flow phonation in speech.  The patient completed a 5-1 glide from G4 to G3  as a preparatory gesture for speech at the phrase-level with minimal verbal and modeling cues.    Instruction of Home Practice:  A revised home exercise plan (HEP) was collaboratively designed and instructed.  The patient confirmed that the HEP was reasonable and that it seemed attainable for daily practice.  Patient identified video hitesh as an activity that would be helpful to practice.  [employer]  [employer]  Back for Blood  Risk of Rain 2  I provided an AVS of today's therapeutic activities to facilitate practice.    ASSESSMENT/PLAN  Progress toward long-term goals:  Adequate but incomplete progress; please see above    Impressions:  IMPRESSIONS:    Ms. 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 Martha had a productive session of therapy today working on generalization tasks in the context of CTT as well as increased nasalance within a linguistic hierarchy. While Cierra demonstrates a highly skilled ability to vary vocal characteristics to increase vocal congruence volitionally, she expressed that she finds it difficult to use a congruent voice in accepting environments (such as with friends and family). We planned for her to attempt to generalize more frequently and in different situations, and she was agreeable to this notion. She will continue to work on her exercises on a daily basis, and work on incorporating the techniques into her daily activities. Speech therapy continues to be medically necessary for Martha to participate fully and engage in activities of daily living.     Plan:   Cierra's care will be transferred to my colleague, Mayuri Barton. Cierra has requested 2 more appointments space 2-3 months apart.    For home practice goals, see AVS.     TOTAL SERVICE TIME:  "57 minutes    CPT Billing Codes:   TREATMENT OF SPEECH, LANGUAGE, VOICE, COMMUNICATION, and/or AUDITORY PROCESSING DISORDER (08511)    Thank you for allowing me to participate in this patient's care,    Chester Valdes M.M., Ph.D., CF-SLP  Clinical Fellow in Voice and Upper Airway Speech-Language Pathology  [facility]  [email or web address]  Pronouns: he/him    OBJECTIVE FINDINGS  PATIENT REPORTED MEASURES  Patient Supplied Answers To Last 2 VHI Questionnaires      4/10/2025     4:10 PM 6/9/2025     9:11 AM   Voice Handicap Index (VHI-10)   My voice makes it difficult for people to hear me 2 2   People have difficulty understanding me in a noisy room 2 2   My voice difficulties restrict my personal and social life.  0 0   I feel left out of conversations because of my voice 0 0   My voice problem causes me to lose income 0 0   I feel as though I have to strain to produce voice 2 2   The clarity of my voice is unpredictable 2 2   My voice problem upsets me 2 2   My voice makes me feel handicapped 2 2   People ask, \"What's wrong with your voice?\" 0 0   VHI-10 12  12        Patient-reported          Patient Supplied Answers To Last 2 CSI Questionnaires      4/10/2025     4:10 PM 6/9/2025     9:11 AM   Cough Severity Index (CSI)   My cough is worse when I lie down 0 0   My coughing problem causes me to restrict my personal and social life 0 0   I tend to avoid places because of my cough problem 0 0   I feel embarrassed because of my coughing problem 0 0   People ask, ''What's wrong?'' because I cough a lot 0 0   I run out of air when I cough 0 0   My coughing problem affects my voice 2 2   My coughing problem limits my physical activity 0 0   My coughing problem upsets me 1 1   People ask me if I am sick because I cough a lot 0 0   CSI Score 3  3        Patient-reported          Patient Supplied Answers To Last 2 EAT Questionnaires       No data to display                  Patient " Supplied Answers to Dyspnea Index Questionnaire:       No data to display                 I attest that the documentation and services performed were provided by Chester Valdes MM PhD CF-SLP, were reviewed and completed under my supervision.     Светлана Welch MS CCC-SLP  Speech-Language Pathologist  [facility]  Department of Otolaryngology - Head and Neck Surgery  [facility]  [email or web address]  Direct: [phone]  [phone]        Again, thank you for allowing me to participate in the care of your patient.      Sincerely,    Chester Valdes SLP

## (undated) DEVICE — PAD CHUX UNDERPAD 30X36" P3036C

## (undated) DEVICE — SU SILK 0 SH 30" K834H

## (undated) DEVICE — PANTIES MESH LG/XLG 2PK 706M2

## (undated) DEVICE — PREP CHLORAPREP 26ML TINTED HI-LITE ORANGE 930815

## (undated) DEVICE — GLOVE BIOGEL PI MICRO SZ 7.5 48575

## (undated) DEVICE — DRAPE LAP W/ARMBOARD 29410

## (undated) DEVICE — ESU PENCIL SMOKE EVAC W/ROCKER SWITCH 0703-047-000

## (undated) DEVICE — RX BACITRACIN OINTMENT 14G 0.5OZ 45802-060-01

## (undated) DEVICE — DRSG KERLIX FLUFFS X5

## (undated) DEVICE — BLADE CLIPPER SGL USE 9680

## (undated) DEVICE — ESU GROUND PAD ADULT W/CORD E7507

## (undated) DEVICE — LINEN TOWEL PACK X5 5464

## (undated) DEVICE — SUCTION TIP YANKAUER W/O VENT K86

## (undated) DEVICE — SU MONOCRYL 4-0 PS-2 27" UND Y426H

## (undated) DEVICE — PACK MINOR CUSTOM ASC

## (undated) DEVICE — SU VICRYL+ 3-0 27IN SH UND VCP416H

## (undated) DEVICE — SOL NACL 0.9% IRRIG 500ML BOTTLE 2F7123

## (undated) RX ORDER — GABAPENTIN 300 MG/1
CAPSULE ORAL
Status: DISPENSED
Start: 2024-06-03

## (undated) RX ORDER — DEXAMETHASONE SODIUM PHOSPHATE 4 MG/ML
INJECTION, SOLUTION INTRA-ARTICULAR; INTRALESIONAL; INTRAMUSCULAR; INTRAVENOUS; SOFT TISSUE
Status: DISPENSED
Start: 2024-06-03

## (undated) RX ORDER — ONDANSETRON 2 MG/ML
INJECTION INTRAMUSCULAR; INTRAVENOUS
Status: DISPENSED
Start: 2024-06-03

## (undated) RX ORDER — PROPOFOL 10 MG/ML
INJECTION, EMULSION INTRAVENOUS
Status: DISPENSED
Start: 2024-06-03

## (undated) RX ORDER — FENTANYL CITRATE 50 UG/ML
INJECTION, SOLUTION INTRAMUSCULAR; INTRAVENOUS
Status: DISPENSED
Start: 2024-06-03

## (undated) RX ORDER — CEFAZOLIN SODIUM 2 G/50ML
SOLUTION INTRAVENOUS
Status: DISPENSED
Start: 2024-06-03

## (undated) RX ORDER — ACETAMINOPHEN 325 MG/1
TABLET ORAL
Status: DISPENSED
Start: 2024-06-03

## (undated) RX ORDER — GLYCOPYRROLATE 0.2 MG/ML
INJECTION INTRAMUSCULAR; INTRAVENOUS
Status: DISPENSED
Start: 2024-06-03